# Patient Record
Sex: FEMALE | Race: WHITE | NOT HISPANIC OR LATINO | Employment: FULL TIME | URBAN - NONMETROPOLITAN AREA
[De-identification: names, ages, dates, MRNs, and addresses within clinical notes are randomized per-mention and may not be internally consistent; named-entity substitution may affect disease eponyms.]

---

## 2017-10-02 ENCOUNTER — OFFICE VISIT (OUTPATIENT)
Dept: OBSTETRICS AND GYNECOLOGY | Facility: CLINIC | Age: 52
End: 2017-10-02

## 2017-10-02 VITALS
WEIGHT: 232 LBS | HEIGHT: 63 IN | SYSTOLIC BLOOD PRESSURE: 124 MMHG | BODY MASS INDEX: 41.11 KG/M2 | DIASTOLIC BLOOD PRESSURE: 76 MMHG

## 2017-10-02 DIAGNOSIS — Z01.411 ENCOUNTER FOR GYNECOLOGICAL EXAMINATION WITH ABNORMAL FINDING: Primary | ICD-10-CM

## 2017-10-02 PROCEDURE — 99386 PREV VISIT NEW AGE 40-64: CPT | Performed by: OBSTETRICS & GYNECOLOGY

## 2017-10-02 PROCEDURE — G0123 SCREEN CERV/VAG THIN LAYER: HCPCS | Performed by: OBSTETRICS & GYNECOLOGY

## 2017-10-02 RX ORDER — PHENOL 1.4 %
600 AEROSOL, SPRAY (ML) MUCOUS MEMBRANE DAILY
COMMUNITY
End: 2017-10-23 | Stop reason: ALTCHOICE

## 2017-10-02 RX ORDER — MAGNESIUM GLUCONATE 27 MG(500)
500 TABLET ORAL 2 TIMES DAILY
COMMUNITY
End: 2019-07-30 | Stop reason: ALTCHOICE

## 2017-10-02 NOTE — PROGRESS NOTES
Janae Randhawa is a 52 y.o. female who presents for a yearly gyn exam.    History of Present Illness  Patient is concerned with heavy menses.  She had an endometrial ablation in 2005.  Her menses are q 3 wks.  The following portions of the patient's history were reviewed and updated as appropriate: allergies, current medications, past family history, past medical history, past social history, past surgical history and problem list.    Review of Systems   Constitutional: Negative for fatigue and unexpected weight change.   HENT: Negative.    Eyes: Negative.    Respiratory: Negative for cough, chest tightness and shortness of breath.    Cardiovascular: Negative for chest pain, palpitations and leg swelling.   Gastrointestinal: Negative for abdominal distention, abdominal pain, anal bleeding, blood in stool, constipation, diarrhea, nausea and vomiting.   Endocrine: Negative for polydipsia and polyuria.   Genitourinary: Positive for menstrual problem. Negative for difficulty urinating, dyspareunia, dysuria, frequency, genital sores, hematuria, pelvic pain, urgency, vaginal bleeding, vaginal discharge and vaginal pain.   Musculoskeletal: Negative.    Skin: Negative for color change and rash.   Allergic/Immunologic: Negative.    Neurological: Negative.  Negative for dizziness, seizures, syncope, light-headedness and headaches.   Hematological: Negative for adenopathy. Does not bruise/bleed easily.   Psychiatric/Behavioral: Negative for agitation, confusion, dysphoric mood, sleep disturbance and suicidal ideas. The patient is not nervous/anxious.        Objective   Physical Exam   Constitutional: She is oriented to person, place, and time. She appears well-developed and well-nourished.   HENT:   Head: Normocephalic.   Eyes: Conjunctivae are normal. Pupils are equal, round, and reactive to light. Right eye exhibits no discharge. Left eye exhibits no discharge.   Neck: Normal range of motion. Neck supple. No tracheal  deviation present. No thyromegaly present.   Cardiovascular: Normal rate, regular rhythm and normal heart sounds.    Pulmonary/Chest: Effort normal and breath sounds normal. She has no wheezes. She has no rales. Right breast exhibits no inverted nipple, no mass, no nipple discharge, no skin change and no tenderness. Left breast exhibits no inverted nipple, no mass, no nipple discharge, no skin change and no tenderness. Breasts are symmetrical. There is no breast swelling.   Abdominal: Soft. Bowel sounds are normal. She exhibits no distension and no mass. There is no tenderness. There is no rebound and no guarding. No hernia. Hernia confirmed negative in the right inguinal area and confirmed negative in the left inguinal area.   Genitourinary: Rectum normal, vagina normal and uterus normal. Rectal exam shows no external hemorrhoid, no internal hemorrhoid, no fissure, no mass, no tenderness and anal tone normal. No breast tenderness, discharge or bleeding. Pelvic exam was performed with patient supine. No labial fusion. There is no rash, tenderness, lesion or injury on the right labia. There is no rash, tenderness, lesion or injury on the left labia. Cervix exhibits no motion tenderness, no discharge and no friability. Right adnexum displays no mass, no tenderness and no fullness. Left adnexum displays no mass, no tenderness and no fullness. No erythema, tenderness or bleeding in the vagina. No foreign body in the vagina. No signs of injury around the vagina. No vaginal discharge found.   Genitourinary Comments: BUS glands appear nl, perineum intact, urethral orifice appears nl, vagina has good support.   Musculoskeletal: Normal range of motion.   Lymphadenopathy:        Right: No inguinal adenopathy present.        Left: No inguinal adenopathy present.   Neurological: She is alert and oriented to person, place, and time. She has normal reflexes.   Skin: Skin is warm and dry. No rash noted.   Psychiatric: She has a  normal mood and affect. Her behavior is normal. Judgment and thought content normal.   Nursing note and vitals reviewed.        Assessment/Plan    WWE  Pap performed   Mammo is up to date.    Menorrhagia s/p an endometrial ablation 12 years ago.  Vaginal US on return.

## 2017-10-03 LAB
GEN CATEG CVX/VAG CYTO-IMP: NORMAL
LAB AP CASE REPORT: NORMAL
LAB AP GYN ADDITIONAL INFORMATION: NORMAL
LAB AP GYN OTHER FINDINGS: NORMAL
Lab: NORMAL
PATH INTERP SPEC-IMP: NORMAL
STAT OF ADQ CVX/VAG CYTO-IMP: NORMAL

## 2017-10-05 ENCOUNTER — OFFICE VISIT (OUTPATIENT)
Dept: OBSTETRICS AND GYNECOLOGY | Facility: CLINIC | Age: 52
End: 2017-10-05

## 2017-10-05 ENCOUNTER — PROCEDURE VISIT (OUTPATIENT)
Dept: OBSTETRICS AND GYNECOLOGY | Facility: CLINIC | Age: 52
End: 2017-10-05

## 2017-10-05 VITALS
HEIGHT: 63 IN | BODY MASS INDEX: 41.46 KG/M2 | WEIGHT: 234 LBS | DIASTOLIC BLOOD PRESSURE: 76 MMHG | SYSTOLIC BLOOD PRESSURE: 120 MMHG

## 2017-10-05 DIAGNOSIS — N92.0 MENORRHAGIA WITH REGULAR CYCLE: Primary | ICD-10-CM

## 2017-10-05 DIAGNOSIS — N92.6 IRREGULAR BLEEDING: Primary | ICD-10-CM

## 2017-10-05 PROCEDURE — 99213 OFFICE O/P EST LOW 20 MIN: CPT | Performed by: OBSTETRICS & GYNECOLOGY

## 2017-10-05 PROCEDURE — 76830 TRANSVAGINAL US NON-OB: CPT | Performed by: OBSTETRICS & GYNECOLOGY

## 2017-10-05 NOTE — PROGRESS NOTES
Janae Randhawa is a 52 y.o. female who has had increasing bleeding.  She is s/p an ablation and was amenorrheic for several years and now is experiencing heavy menses.      History of Present Illness  As above and her US was normal except for a 14 mm lining.    The following portions of the patient's history were reviewed and updated as appropriate: allergies, current medications, past family history, past medical history, past social history, past surgical history and problem list.    Review of Systems   Constitutional: Negative for fatigue and unexpected weight change.   HENT: Negative.    Eyes: Negative.    Respiratory: Negative for cough, chest tightness and shortness of breath.    Cardiovascular: Negative for chest pain, palpitations and leg swelling.   Gastrointestinal: Negative for abdominal distention, abdominal pain, anal bleeding, blood in stool, constipation, diarrhea, nausea and vomiting.   Endocrine: Negative for polydipsia and polyuria.   Genitourinary: Positive for menstrual problem. Negative for difficulty urinating, dyspareunia, dysuria, frequency, genital sores, hematuria, pelvic pain, urgency, vaginal bleeding, vaginal discharge and vaginal pain.   Musculoskeletal: Negative.    Skin: Negative for color change and rash.   Allergic/Immunologic: Negative.    Neurological: Negative.  Negative for dizziness, seizures, syncope, light-headedness and headaches.   Hematological: Negative for adenopathy. Does not bruise/bleed easily.   Psychiatric/Behavioral: Negative for agitation, confusion, dysphoric mood, sleep disturbance and suicidal ideas. The patient is not nervous/anxious.        Objective   Physical Exam   Constitutional: She is oriented to person, place, and time. She appears well-developed and well-nourished.   HENT:   Head: Normocephalic.   Eyes: Pupils are equal, round, and reactive to light.   Neck: Normal range of motion.   Cardiovascular: Normal rate.    Pulmonary/Chest: Effort  normal.   Abdominal: Soft.   Musculoskeletal: Normal range of motion.   Neurological: She is alert and oriented to person, place, and time.   Skin: Skin is warm and dry.   Psychiatric: She has a normal mood and affect. Her behavior is normal. Judgment and thought content normal.       Assessment/Plan   Menorrhagia, s/p an ablation  Endo stripe 14mm  Will check FSH and Estradiol to see if she is postmenopausal.

## 2017-10-06 LAB
ESTRADIOL SERPL-MCNC: 182.9 PG/ML
FSH SERPL-ACNC: 6.9 MIU/ML

## 2017-10-19 ENCOUNTER — TELEPHONE (OUTPATIENT)
Dept: OBSTETRICS AND GYNECOLOGY | Facility: CLINIC | Age: 52
End: 2017-10-19

## 2017-10-19 NOTE — TELEPHONE ENCOUNTER
Patient informed that her FSH showed that we are premenopausal. Patient has a follow up appointment Monday(10/23/17).

## 2017-10-23 ENCOUNTER — OFFICE VISIT (OUTPATIENT)
Dept: OBSTETRICS AND GYNECOLOGY | Facility: CLINIC | Age: 52
End: 2017-10-23

## 2017-10-23 VITALS
SYSTOLIC BLOOD PRESSURE: 130 MMHG | DIASTOLIC BLOOD PRESSURE: 80 MMHG | WEIGHT: 234 LBS | BODY MASS INDEX: 41.46 KG/M2 | HEIGHT: 63 IN

## 2017-10-23 DIAGNOSIS — N92.0 MENORRHAGIA WITH REGULAR CYCLE: Primary | ICD-10-CM

## 2017-10-23 PROCEDURE — 99212 OFFICE O/P EST SF 10 MIN: CPT | Performed by: OBSTETRICS & GYNECOLOGY

## 2017-10-23 NOTE — PROGRESS NOTES
Subjective   Alyssa Randhawa is a 52 y.o. female who is s/p an endometrial ablation several years ago.  Patient is now having heavy bleeding.    History of Present Illness  Patient did well with the endometrial ablation for several years but is now bleeding heavily.  Patient's endometrial stripe is 14 mm.  Her FSH is premenopausal.  She is here to discuss her options.  The following portions of the patient's history were reviewed and updated as appropriate: allergies, current medications, past family history, past medical history, past social history, past surgical history and problem list.    Review of Systems   Genitourinary: Positive for menstrual problem.       Objective   Physical Exam   Constitutional: She is oriented to person, place, and time. She appears well-developed and well-nourished.   HENT:   Head: Normocephalic.   Neck: Normal range of motion.   Cardiovascular: Normal rate.    Pulmonary/Chest: Effort normal.   Musculoskeletal: Normal range of motion.   Neurological: She is alert and oriented to person, place, and time.   Skin: Skin is warm and dry.   Psychiatric: She has a normal mood and affect. Her behavior is normal. Judgment and thought content normal.   Nursing note and vitals reviewed.      Assessment/Plan     Menorrhagia s/p an endometrial ablation  Patient is premenopausal.  Discussed Mirena vs repeat endometrial ablation.    Patient wants a Mirena.    Discuss RBO

## 2017-11-08 ENCOUNTER — OFFICE VISIT (OUTPATIENT)
Dept: OBSTETRICS AND GYNECOLOGY | Facility: CLINIC | Age: 52
End: 2017-11-08

## 2017-11-08 VITALS
DIASTOLIC BLOOD PRESSURE: 78 MMHG | WEIGHT: 230 LBS | HEIGHT: 63 IN | SYSTOLIC BLOOD PRESSURE: 134 MMHG | BODY MASS INDEX: 40.75 KG/M2

## 2017-11-08 DIAGNOSIS — Z30.430 ENCOUNTER FOR INSERTION OF MIRENA IUD: Primary | ICD-10-CM

## 2017-11-08 LAB
B-HCG UR QL: NEGATIVE
INTERNAL NEGATIVE CONTROL: NORMAL
INTERNAL POSITIVE CONTROL: NORMAL
Lab: NORMAL

## 2017-11-08 PROCEDURE — 81025 URINE PREGNANCY TEST: CPT | Performed by: OBSTETRICS & GYNECOLOGY

## 2017-11-08 PROCEDURE — 58300 INSERT INTRAUTERINE DEVICE: CPT | Performed by: OBSTETRICS & GYNECOLOGY

## 2017-11-08 NOTE — PROGRESS NOTES
IUD Insertion Procedure Note    Pre-operative Diagnosis: Desires IUD insertion    Post-operative Diagnosis: Same    Indications: contraception and menorrhagia    Procedure Details   Urine pregnancy test was done and found to be negative. The risks (including infection, bleeding, pain, and uterine perforation) and benefits of the procedure were explained to the patient and written informed consent was obtained.      The cervix was cleansed with Betadine paint.   The uterus was sounded without dilation. The uterus sounded to 7 cm and the  IUD was inserted without difficulty.The string was visualized and trimmed. Patient tolerated procedure well.    IUD Information:  Mirena, Lot # [id number], Expiration date 12/2018, NDC#: 0527726212.    Condition at termination of procedure:  Stable    Complications:  None    Plan:    The patient was advised to call for any fever or for prolonged severe pain and or bleeding. She was advised to use OTC ibuprofen as needed for mild to moderate pain.   RV 2 wks to assess IUD placement

## 2017-11-16 ENCOUNTER — PROCEDURE VISIT (OUTPATIENT)
Dept: OBSTETRICS AND GYNECOLOGY | Facility: CLINIC | Age: 52
End: 2017-11-16

## 2017-11-16 ENCOUNTER — OFFICE VISIT (OUTPATIENT)
Dept: OBSTETRICS AND GYNECOLOGY | Facility: CLINIC | Age: 52
End: 2017-11-16

## 2017-11-16 VITALS
SYSTOLIC BLOOD PRESSURE: 138 MMHG | WEIGHT: 228 LBS | DIASTOLIC BLOOD PRESSURE: 72 MMHG | BODY MASS INDEX: 40.4 KG/M2 | HEIGHT: 63 IN

## 2017-11-16 DIAGNOSIS — R10.2 PELVIC PAIN: Primary | ICD-10-CM

## 2017-11-16 DIAGNOSIS — N92.0 MENORRHAGIA WITH REGULAR CYCLE: Primary | ICD-10-CM

## 2017-11-16 PROCEDURE — 76856 US EXAM PELVIC COMPLETE: CPT | Performed by: OBSTETRICS & GYNECOLOGY

## 2017-11-16 PROCEDURE — 58301 REMOVE INTRAUTERINE DEVICE: CPT | Performed by: OBSTETRICS & GYNECOLOGY

## 2017-11-16 NOTE — PROGRESS NOTES
IUD Removal Procedure Note    Type of IUD:  Mirena  Date of insertion:  unknown  Reason for removal:  Side effect: severe cramping  Other relevant history/information:  Patient is having severe cramping since insertion of the Mirena and wants the Mirena removed.  US today showed the Mirena in the proper place.      Procedure Details  IUD strings visible:  yes  Local anesthesia:  None  Tenaculum used:  None  Removal:  IUD strings grasped and IUD removed intact with gentle traction.  The patient tolerated the procedure well.    All appropriate instructions regarding removal were reviewed.    Plans for contraception:  no method    Other follow-up needed:  Patient has had an endometrial ablation and has begun heavy bleeding.  She is definitely not postmenopausal and desires a vaginal hysterectomy.      The patient was advised to call for any fever or for prolonged or severe pain or bleeding. She was advised to use OTC ibuprofen as needed for mild to moderate pain.

## 2017-11-21 ENCOUNTER — OFFICE VISIT (OUTPATIENT)
Dept: OBSTETRICS AND GYNECOLOGY | Facility: CLINIC | Age: 52
End: 2017-11-21

## 2017-11-21 VITALS
WEIGHT: 233 LBS | DIASTOLIC BLOOD PRESSURE: 80 MMHG | SYSTOLIC BLOOD PRESSURE: 124 MMHG | BODY MASS INDEX: 41.29 KG/M2 | HEIGHT: 63 IN

## 2017-11-21 DIAGNOSIS — N92.0 MENORRHAGIA WITH REGULAR CYCLE: Primary | ICD-10-CM

## 2017-11-21 DIAGNOSIS — N94.6 DYSMENORRHEA: ICD-10-CM

## 2017-11-21 PROCEDURE — 99213 OFFICE O/P EST LOW 20 MIN: CPT | Performed by: OBSTETRICS & GYNECOLOGY

## 2017-11-21 RX ORDER — PHENAZOPYRIDINE HYDROCHLORIDE 100 MG/1
100 TABLET, FILM COATED ORAL ONCE
Status: CANCELLED | OUTPATIENT
Start: 2017-11-21

## 2017-11-21 RX ORDER — SODIUM CHLORIDE 9 MG/ML
100 INJECTION, SOLUTION INTRAVENOUS CONTINUOUS
Status: CANCELLED | OUTPATIENT
Start: 2017-11-21

## 2017-11-21 NOTE — PROGRESS NOTES
"Alyssa Randhawa is a 52 y.o. female here today to discuss treatment of menorrhagia and dysmenorrhea.  She has been evaluated by Dr. Palacios and has a history of prior endometrial ablation and a recent attempt at using a Mirena IUD.  She had severe cramping after placement of the IUD and it had to be removed.  She is para 2 having had 2 vaginal deliveries.  Her ultrasound shows a mildly enlarged uterus with otherwise normal findings.    Visit Vitals   • /80   • Ht 63\" (160 cm)   • Wt 233 lb (106 kg)   • LMP 11/08/2017   • BMI 41.27 kg/m2     Pleasant female no acute distress  Breathing unlabored  Mood and affect normal  Insight and judgment intact    Assessment: Menorrhagia and dysmenorrhea    I reviewed her history and ultrasound findings.  We discussed treatment options as well as minimally invasive surgery.  She has failed a couple of different conservative treatment measures, and would like to proceed with definitive surgery with hysterectomy.  She would like to return to work as fast as possible, and given the mild enlargement of her uterus, we discussed a laparoscopic hysterectomy.  We discussed using the da Antonieta surgical system to proceed with a laparoscopic hysterectomy as well as bilateral salpingectomy for ovarian cancer risk reduction.  We discussed risks of surgery including bleeding, infection, and damage to surrounding structures.  She would like to proceed and is scheduled for surgery on December 6.    "

## 2017-11-21 NOTE — H&P
[facility]  Alyssa Randhawa  : 1965  MRN: [id number]  CSN: [id number]    History and Physical    Subjective   Alyssa Randhawa is a 52 y.o. year old  who presents for consultation due to menorrhagia and dysmenorrhea.  She is para 2 and has had 2 vaginal deliveries.  She has had a previous endometrial ablation, but has had return of her symptoms.  A recent trial of therapy with a Mirena IUD was unsuccessful.    Past Medical History:   Diagnosis Date   • Arthritis    • Bone spur     spine   • Heel spur      Past Surgical History:   Procedure Laterality Date   • CHOLECYSTECTOMY     • ENDOMETRIAL ABLATION     • KNEE MENISCAL REPAIR Left    • LEG SURGERY Right     spiral fracture    • WISDOM TOOTH EXTRACTION       Smoking status: Former Smoker                                                              Packs/day: 0.00      Years: 0.00      Smokeless status: Never Used                          Current Outpatient Prescriptions:   •  Calcium-Iron-Vit D-Vit K (CALCIUM SOFT CHEWS) [id number] MG-UNT-MCG chewable tablet, Chew., Disp: , Rfl:   •  magnesium gluconate (MAGONATE) 500 MG tablet, Take 500 mg by mouth 2 (Two) Times a Day., Disp: , Rfl:   •  Multiple Vitamin (MULTI VITAMIN DAILY PO), Take 1 tablet by mouth Daily., Disp: , Rfl:   •  Unable to find, Take 1 each by mouth 1 (One) Time. Wild cherry extract, Disp: , Rfl:     No Known Allergies    Family History   Problem Relation Age of Onset   • Breast cancer Sister    • Hypertension Father    • Coronary artery disease Mother    • Hypertension Mother    • Diabetes Mother    • Ovarian cancer Neg Hx    • Uterine cancer Neg Hx    • Colon cancer Neg Hx    • Melanoma Neg Hx      Review of Systems   Constitutional: Negative for unexpected weight change.   HENT: Negative for trouble swallowing.    Respiratory: Negative for shortness of breath.    Cardiovascular: Negative for chest pain.   Genitourinary: Positive for menstrual problem.   Musculoskeletal: Negative for neck  "stiffness.   Neurological: Negative for seizures.         Objective   /80  Ht 63\" (160 cm)  Wt 233 lb (106 kg)  LMP 11/08/2017  BMI 41.27 kg/m2    Physical Exam   Physical Exam   Constitutional: She is oriented to person, place, and time. She appears well-developed and well-nourished.   HENT:   Head: Normocephalic and atraumatic.   Eyes: No scleral icterus.   Neck: No tracheal deviation present.   Cardiovascular: Normal rate and regular rhythm.    Pulmonary/Chest: Effort normal and breath sounds normal.   Abdominal: Soft. Bowel sounds are normal. She exhibits no distension. There is no tenderness.   Genitourinary: Uterus normal. Rectal exam shows no external hemorrhoid. Pelvic exam was performed with patient supine. There is no lesion on the right labia. There is no lesion on the left labia. Uterus is not enlarged, not fixed and not tender. Cervix exhibits no motion tenderness. Right adnexum displays no mass. Left adnexum displays no mass. No bleeding in the vagina. No vaginal discharge found.   Lymphadenopathy:        Right: No inguinal adenopathy present.        Left: No inguinal adenopathy present.   Neurological: She is alert and oriented to person, place, and time.   Skin: Skin is warm and dry.   Vitals reviewed.      Labs  No results found for: HCG, PLT, HGB, HCT, WBC, NA, K, CL, CO2, BUN, CREATININE, GLUCOSE, ALBUMIN, CALCIUM, AST, ALT, BILITOT     Assessment & Plan    Alyssa was seen today for follow-up.    Diagnoses and all orders for this visit:    Menorrhagia with regular cycle  -     Case Request  -     CBC (No Diff); Future  -     Type and screen; Future  -     sodium chloride 0.9 % infusion; Infuse 100 mL/hr into a venous catheter Continuous.  -     Basic Metabolic Panel; Future  -     ECG 12 Lead; Future  -     XR Chest 1 View; Future  -     cefOXitin (MEFOXIN) 2 g in sodium chloride 0.9 % 100 mL IVPB; Infuse 2 g into a venous catheter 1 (One) Time.  -     phenazopyridine (PYRIDIUM) tablet 100 " mg; Take 1 tablet by mouth 1 (One) Time.    Dysmenorrhea  -     Case Request  -     CBC (No Diff); Future  -     Type and screen; Future  -     sodium chloride 0.9 % infusion; Infuse 100 mL/hr into a venous catheter Continuous.  -     Basic Metabolic Panel; Future  -     ECG 12 Lead; Future  -     XR Chest 1 View; Future  -     cefOXitin (MEFOXIN) 2 g in sodium chloride 0.9 % 100 mL IVPB; Infuse 2 g into a venous catheter 1 (One) Time.  -     phenazopyridine (PYRIDIUM) tablet 100 mg; Take 1 tablet by mouth 1 (One) Time.    Other orders  -     Follow Anesthesia Guidelines / Standing Orders; Future  -     Provide instructions to patient on NPO status  -     Follow Anesthesia Guidelines / Standing Orders; Standing  -     Verify NPO Status; Standing  -     Obtain informed consent; Standing  -     SCD (sequential compression device)- to be placed on patient in Pre-op; Standing  -     Clip operative site; Standing  -     Have patient void prior to transfer; Standing  -     Type & Screen; Standing    Risks, benefits, and alternatives of a hysterectomy were discussed with the patient in detail. Intraoperative risks of bleeding and damage to surrounding organs, including but not limited to intestine, bladder and ureter, were explained. Management of these were also explained. Postoperative complications such as infection, pneumonia, DVT, and bleeding were explained. Cuff dehiscence and cuff hematoma/cellulitis were also explained. The importance of compliance with postoperative restrictions was discussed. Long term effect regarding pelvic organ prolapse was also explained. Laparoscopic approach with use of the da Antonieta robotic system was explained. Indications for conversion to a laparotomy were discussed.     Ovarian conservation/removal was also discussed. Removal of fallopian tubes regardless of ovarian removal was discussed and patient verbalized understanding.    All of the patient's questions were answered to her  satisfaction. She was encouraged to return for an additional appointment if she had further questions. She verbalized understanding of the above and wished to proceed with the outlined plan.      Greg Mejía MD  11/21/2017  1:04 PM

## 2017-11-26 ENCOUNTER — RESULTS ENCOUNTER (OUTPATIENT)
Dept: OBSTETRICS AND GYNECOLOGY | Facility: CLINIC | Age: 52
End: 2017-11-26

## 2017-11-26 DIAGNOSIS — N94.6 DYSMENORRHEA: ICD-10-CM

## 2017-11-26 DIAGNOSIS — N92.0 MENORRHAGIA WITH REGULAR CYCLE: ICD-10-CM

## 2017-11-29 ENCOUNTER — APPOINTMENT (OUTPATIENT)
Dept: PREADMISSION TESTING | Facility: HOSPITAL | Age: 52
End: 2017-11-29

## 2017-11-29 ENCOUNTER — HOSPITAL ENCOUNTER (OUTPATIENT)
Dept: GENERAL RADIOLOGY | Facility: HOSPITAL | Age: 52
Discharge: HOME | End: 2017-11-29
Admitting: OBSTETRICS & GYNECOLOGY

## 2017-11-29 VITALS
DIASTOLIC BLOOD PRESSURE: 77 MMHG | BODY MASS INDEX: 40.36 KG/M2 | OXYGEN SATURATION: 98 % | HEART RATE: 73 BPM | RESPIRATION RATE: 16 BRPM | SYSTOLIC BLOOD PRESSURE: 129 MMHG | HEIGHT: 64 IN | WEIGHT: 236.4 LBS

## 2017-11-29 DIAGNOSIS — N92.0 MENORRHAGIA WITH REGULAR CYCLE: ICD-10-CM

## 2017-11-29 DIAGNOSIS — N94.6 DYSMENORRHEA: ICD-10-CM

## 2017-11-29 LAB
ANION GAP SERPL CALCULATED.3IONS-SCNC: 8 MMOL/L (ref 4–13)
BUN BLD-MCNC: 12 MG/DL (ref 5–21)
BUN/CREAT SERPL: 16 (ref 7–25)
CALCIUM SPEC-SCNC: 9.2 MG/DL (ref 8.4–10.4)
CHLORIDE SERPL-SCNC: 102 MMOL/L (ref 98–110)
CO2 SERPL-SCNC: 28 MMOL/L (ref 24–31)
CREAT BLD-MCNC: 0.75 MG/DL (ref 0.5–1.4)
DEPRECATED RDW RBC AUTO: 36.9 FL (ref 40–54)
ERYTHROCYTE [DISTWIDTH] IN BLOOD BY AUTOMATED COUNT: 12.4 % (ref 12–15)
GFR SERPL CREATININE-BSD FRML MDRD: 81 ML/MIN/1.73
GLUCOSE BLD-MCNC: 89 MG/DL (ref 70–100)
HCT VFR BLD AUTO: 38.5 % (ref 37–47)
HGB BLD-MCNC: 13.4 G/DL (ref 12–16)
MCH RBC QN AUTO: 28.7 PG (ref 28–32)
MCHC RBC AUTO-ENTMCNC: 34.8 G/DL (ref 33–36)
MCV RBC AUTO: 82.4 FL (ref 82–98)
PLATELET # BLD AUTO: 337 10*3/MM3 (ref 130–400)
PMV BLD AUTO: 10.8 FL (ref 6–12)
POTASSIUM BLD-SCNC: 4.3 MMOL/L (ref 3.5–5.3)
RBC # BLD AUTO: 4.67 10*6/MM3 (ref 4.2–5.4)
SODIUM BLD-SCNC: 138 MMOL/L (ref 135–145)
WBC NRBC COR # BLD: 10.2 10*3/MM3 (ref 4.8–10.8)

## 2017-11-29 PROCEDURE — 36415 COLL VENOUS BLD VENIPUNCTURE: CPT | Performed by: OBSTETRICS & GYNECOLOGY

## 2017-11-29 PROCEDURE — 85027 COMPLETE CBC AUTOMATED: CPT | Performed by: OBSTETRICS & GYNECOLOGY

## 2017-11-29 PROCEDURE — 71010 HC CHEST PA OR AP: CPT

## 2017-11-29 PROCEDURE — 93005 ELECTROCARDIOGRAM TRACING: CPT

## 2017-11-29 PROCEDURE — 80048 BASIC METABOLIC PNL TOTAL CA: CPT | Performed by: OBSTETRICS & GYNECOLOGY

## 2017-11-29 PROCEDURE — 93010 ELECTROCARDIOGRAM REPORT: CPT | Performed by: INTERNAL MEDICINE

## 2017-12-05 ENCOUNTER — ANESTHESIA EVENT (OUTPATIENT)
Dept: PERIOP | Facility: HOSPITAL | Age: 52
End: 2017-12-05

## 2017-12-05 RX ORDER — SODIUM CHLORIDE 0.9 % (FLUSH) 0.9 %
1-10 SYRINGE (ML) INJECTION AS NEEDED
Status: DISCONTINUED | OUTPATIENT
Start: 2017-12-06 | End: 2017-12-06 | Stop reason: HOSPADM

## 2017-12-05 RX ORDER — SODIUM CHLORIDE, SODIUM LACTATE, POTASSIUM CHLORIDE, CALCIUM CHLORIDE 600; 310; 30; 20 MG/100ML; MG/100ML; MG/100ML; MG/100ML
30 INJECTION, SOLUTION INTRAVENOUS CONTINUOUS
Status: DISCONTINUED | OUTPATIENT
Start: 2017-12-06 | End: 2017-12-06 | Stop reason: HOSPADM

## 2017-12-06 ENCOUNTER — ANESTHESIA (OUTPATIENT)
Dept: PERIOP | Facility: HOSPITAL | Age: 52
End: 2017-12-06

## 2017-12-06 ENCOUNTER — HOSPITAL ENCOUNTER (OUTPATIENT)
Facility: HOSPITAL | Age: 52
Setting detail: HOSPITAL OUTPATIENT SURGERY
Discharge: HOME | End: 2017-12-06
Attending: OBSTETRICS & GYNECOLOGY | Admitting: OBSTETRICS & GYNECOLOGY

## 2017-12-06 VITALS
DIASTOLIC BLOOD PRESSURE: 64 MMHG | OXYGEN SATURATION: 95 % | SYSTOLIC BLOOD PRESSURE: 128 MMHG | RESPIRATION RATE: 16 BRPM | HEART RATE: 99 BPM | TEMPERATURE: 97.7 F

## 2017-12-06 DIAGNOSIS — N92.0 MENORRHAGIA WITH REGULAR CYCLE: ICD-10-CM

## 2017-12-06 DIAGNOSIS — N94.6 DYSMENORRHEA: ICD-10-CM

## 2017-12-06 LAB
ABO GROUP BLD: NORMAL
B-HCG UR QL: NEGATIVE
BLD GP AB SCN SERPL QL: NEGATIVE
RH BLD: POSITIVE

## 2017-12-06 PROCEDURE — 86901 BLOOD TYPING SEROLOGIC RH(D): CPT | Performed by: OBSTETRICS & GYNECOLOGY

## 2017-12-06 PROCEDURE — 25010000002 DEXAMETHASONE PER 1 MG: Performed by: ANESTHESIOLOGY

## 2017-12-06 PROCEDURE — 58571 TLH W/T/O 250 G OR LESS: CPT | Performed by: OBSTETRICS & GYNECOLOGY

## 2017-12-06 PROCEDURE — 25010000002 ONDANSETRON PER 1 MG: Performed by: NURSE ANESTHETIST, CERTIFIED REGISTERED

## 2017-12-06 PROCEDURE — 25010000002 MIDAZOLAM PER 1 MG: Performed by: ANESTHESIOLOGY

## 2017-12-06 PROCEDURE — 25010000002 CEFOXITIN: Performed by: OBSTETRICS & GYNECOLOGY

## 2017-12-06 PROCEDURE — 86900 BLOOD TYPING SEROLOGIC ABO: CPT | Performed by: OBSTETRICS & GYNECOLOGY

## 2017-12-06 PROCEDURE — 25010000002 NEOSTIGMINE PER 0.5 MG: Performed by: NURSE ANESTHETIST, CERTIFIED REGISTERED

## 2017-12-06 PROCEDURE — 86850 RBC ANTIBODY SCREEN: CPT | Performed by: OBSTETRICS & GYNECOLOGY

## 2017-12-06 PROCEDURE — 25010000002 PROPOFOL 10 MG/ML EMULSION: Performed by: NURSE ANESTHETIST, CERTIFIED REGISTERED

## 2017-12-06 PROCEDURE — 25010000002 DEXAMETHASONE PER 1 MG: Performed by: NURSE ANESTHETIST, CERTIFIED REGISTERED

## 2017-12-06 PROCEDURE — 81025 URINE PREGNANCY TEST: CPT | Performed by: OBSTETRICS & GYNECOLOGY

## 2017-12-06 PROCEDURE — 25010000002 FENTANYL CITRATE (PF) 100 MCG/2ML SOLUTION: Performed by: NURSE ANESTHETIST, CERTIFIED REGISTERED

## 2017-12-06 PROCEDURE — 88307 TISSUE EXAM BY PATHOLOGIST: CPT | Performed by: OBSTETRICS & GYNECOLOGY

## 2017-12-06 PROCEDURE — 25010000002 SUCCINYLCHOLINE PER 20 MG: Performed by: NURSE ANESTHETIST, CERTIFIED REGISTERED

## 2017-12-06 RX ORDER — LIDOCAINE HYDROCHLORIDE 20 MG/ML
INJECTION, SOLUTION INFILTRATION; PERINEURAL AS NEEDED
Status: DISCONTINUED | OUTPATIENT
Start: 2017-12-06 | End: 2017-12-06 | Stop reason: SURG

## 2017-12-06 RX ORDER — ROCURONIUM BROMIDE 10 MG/ML
INJECTION, SOLUTION INTRAVENOUS AS NEEDED
Status: DISCONTINUED | OUTPATIENT
Start: 2017-12-06 | End: 2017-12-06 | Stop reason: SURG

## 2017-12-06 RX ORDER — MEPERIDINE HYDROCHLORIDE 25 MG/ML
12.5 INJECTION INTRAMUSCULAR; INTRAVENOUS; SUBCUTANEOUS
Status: DISCONTINUED | OUTPATIENT
Start: 2017-12-06 | End: 2017-12-06 | Stop reason: HOSPADM

## 2017-12-06 RX ORDER — PHENYLEPHRINE HCL IN 0.9% NACL 0.8MG/10ML
SYRINGE (ML) INTRAVENOUS AS NEEDED
Status: DISCONTINUED | OUTPATIENT
Start: 2017-12-06 | End: 2017-12-06 | Stop reason: SURG

## 2017-12-06 RX ORDER — SCOLOPAMINE TRANSDERMAL SYSTEM 1 MG/1
1 PATCH, EXTENDED RELEASE TRANSDERMAL ONCE
Status: DISCONTINUED | OUTPATIENT
Start: 2017-12-06 | End: 2017-12-06 | Stop reason: HOSPADM

## 2017-12-06 RX ORDER — SUFENTANIL CITRATE 50 UG/ML
INJECTION EPIDURAL; INTRAVENOUS AS NEEDED
Status: DISCONTINUED | OUTPATIENT
Start: 2017-12-06 | End: 2017-12-06 | Stop reason: SURG

## 2017-12-06 RX ORDER — IPRATROPIUM BROMIDE AND ALBUTEROL SULFATE 2.5; .5 MG/3ML; MG/3ML
3 SOLUTION RESPIRATORY (INHALATION) ONCE AS NEEDED
Status: DISCONTINUED | OUTPATIENT
Start: 2017-12-06 | End: 2017-12-06 | Stop reason: HOSPADM

## 2017-12-06 RX ORDER — PHENAZOPYRIDINE HYDROCHLORIDE 100 MG/1
100 TABLET, FILM COATED ORAL ONCE
Status: COMPLETED | OUTPATIENT
Start: 2017-12-06 | End: 2017-12-06

## 2017-12-06 RX ORDER — PROPOFOL 10 MG/ML
VIAL (ML) INTRAVENOUS AS NEEDED
Status: DISCONTINUED | OUTPATIENT
Start: 2017-12-06 | End: 2017-12-06 | Stop reason: SURG

## 2017-12-06 RX ORDER — SODIUM CHLORIDE 9 MG/ML
100 INJECTION, SOLUTION INTRAVENOUS CONTINUOUS
Status: DISCONTINUED | OUTPATIENT
Start: 2017-12-06 | End: 2017-12-06 | Stop reason: HOSPADM

## 2017-12-06 RX ORDER — DEXAMETHASONE SODIUM PHOSPHATE 4 MG/ML
INJECTION, SOLUTION INTRA-ARTICULAR; INTRALESIONAL; INTRAMUSCULAR; INTRAVENOUS; SOFT TISSUE AS NEEDED
Status: DISCONTINUED | OUTPATIENT
Start: 2017-12-06 | End: 2017-12-06 | Stop reason: SURG

## 2017-12-06 RX ORDER — SUCCINYLCHOLINE CHLORIDE 20 MG/ML
INJECTION INTRAMUSCULAR; INTRAVENOUS AS NEEDED
Status: DISCONTINUED | OUTPATIENT
Start: 2017-12-06 | End: 2017-12-06 | Stop reason: SURG

## 2017-12-06 RX ORDER — MIDAZOLAM HYDROCHLORIDE 1 MG/ML
2 INJECTION INTRAMUSCULAR; INTRAVENOUS
Status: DISCONTINUED | OUTPATIENT
Start: 2017-12-06 | End: 2017-12-06 | Stop reason: HOSPADM

## 2017-12-06 RX ORDER — MAGNESIUM HYDROXIDE 1200 MG/15ML
LIQUID ORAL AS NEEDED
Status: DISCONTINUED | OUTPATIENT
Start: 2017-12-06 | End: 2017-12-06 | Stop reason: HOSPADM

## 2017-12-06 RX ORDER — ONDANSETRON 2 MG/ML
4 INJECTION INTRAMUSCULAR; INTRAVENOUS AS NEEDED
Status: DISCONTINUED | OUTPATIENT
Start: 2017-12-06 | End: 2017-12-06 | Stop reason: HOSPADM

## 2017-12-06 RX ORDER — FLUMAZENIL 0.1 MG/ML
0.2 INJECTION INTRAVENOUS AS NEEDED
Status: DISCONTINUED | OUTPATIENT
Start: 2017-12-06 | End: 2017-12-06 | Stop reason: HOSPADM

## 2017-12-06 RX ORDER — LABETALOL HYDROCHLORIDE 5 MG/ML
5 INJECTION, SOLUTION INTRAVENOUS
Status: DISCONTINUED | OUTPATIENT
Start: 2017-12-06 | End: 2017-12-06 | Stop reason: HOSPADM

## 2017-12-06 RX ORDER — LIDOCAINE HYDROCHLORIDE AND EPINEPHRINE 10; 10 MG/ML; UG/ML
INJECTION, SOLUTION INFILTRATION; PERINEURAL AS NEEDED
Status: DISCONTINUED | OUTPATIENT
Start: 2017-12-06 | End: 2017-12-06 | Stop reason: HOSPADM

## 2017-12-06 RX ORDER — MIDAZOLAM HYDROCHLORIDE 1 MG/ML
1 INJECTION INTRAMUSCULAR; INTRAVENOUS
Status: DISCONTINUED | OUTPATIENT
Start: 2017-12-06 | End: 2017-12-06 | Stop reason: HOSPADM

## 2017-12-06 RX ORDER — GLYCOPYRROLATE 0.2 MG/ML
INJECTION INTRAMUSCULAR; INTRAVENOUS AS NEEDED
Status: DISCONTINUED | OUTPATIENT
Start: 2017-12-06 | End: 2017-12-06 | Stop reason: SURG

## 2017-12-06 RX ORDER — SODIUM CHLORIDE 0.9 % (FLUSH) 0.9 %
3 SYRINGE (ML) INJECTION AS NEEDED
Status: DISCONTINUED | OUTPATIENT
Start: 2017-12-06 | End: 2017-12-06 | Stop reason: HOSPADM

## 2017-12-06 RX ORDER — BUPIVACAINE HYDROCHLORIDE 2.5 MG/ML
INJECTION, SOLUTION EPIDURAL; INFILTRATION; INTRACAUDAL AS NEEDED
Status: DISCONTINUED | OUTPATIENT
Start: 2017-12-06 | End: 2017-12-06 | Stop reason: HOSPADM

## 2017-12-06 RX ORDER — SODIUM CHLORIDE 0.9 % (FLUSH) 0.9 %
1-10 SYRINGE (ML) INJECTION AS NEEDED
Status: DISCONTINUED | OUTPATIENT
Start: 2017-12-06 | End: 2017-12-06 | Stop reason: HOSPADM

## 2017-12-06 RX ORDER — SODIUM CHLORIDE, SODIUM LACTATE, POTASSIUM CHLORIDE, CALCIUM CHLORIDE 600; 310; 30; 20 MG/100ML; MG/100ML; MG/100ML; MG/100ML
1000 INJECTION, SOLUTION INTRAVENOUS CONTINUOUS
Status: DISCONTINUED | OUTPATIENT
Start: 2017-12-06 | End: 2017-12-06 | Stop reason: HOSPADM

## 2017-12-06 RX ORDER — METOCLOPRAMIDE HYDROCHLORIDE 5 MG/ML
5 INJECTION INTRAMUSCULAR; INTRAVENOUS
Status: DISCONTINUED | OUTPATIENT
Start: 2017-12-06 | End: 2017-12-06 | Stop reason: HOSPADM

## 2017-12-06 RX ORDER — NALOXONE HCL 0.4 MG/ML
0.04 VIAL (ML) INJECTION AS NEEDED
Status: DISCONTINUED | OUTPATIENT
Start: 2017-12-06 | End: 2017-12-06 | Stop reason: HOSPADM

## 2017-12-06 RX ORDER — ONDANSETRON 2 MG/ML
INJECTION INTRAMUSCULAR; INTRAVENOUS AS NEEDED
Status: DISCONTINUED | OUTPATIENT
Start: 2017-12-06 | End: 2017-12-06 | Stop reason: SURG

## 2017-12-06 RX ORDER — SODIUM CHLORIDE, SODIUM LACTATE, POTASSIUM CHLORIDE, CALCIUM CHLORIDE 600; 310; 30; 20 MG/100ML; MG/100ML; MG/100ML; MG/100ML
100 INJECTION, SOLUTION INTRAVENOUS CONTINUOUS
Status: DISCONTINUED | OUTPATIENT
Start: 2017-12-06 | End: 2017-12-06 | Stop reason: HOSPADM

## 2017-12-06 RX ORDER — HYDRALAZINE HYDROCHLORIDE 20 MG/ML
5 INJECTION INTRAMUSCULAR; INTRAVENOUS
Status: DISCONTINUED | OUTPATIENT
Start: 2017-12-06 | End: 2017-12-06 | Stop reason: HOSPADM

## 2017-12-06 RX ORDER — MORPHINE SULFATE 2 MG/ML
2 INJECTION, SOLUTION INTRAMUSCULAR; INTRAVENOUS AS NEEDED
Status: DISCONTINUED | OUTPATIENT
Start: 2017-12-06 | End: 2017-12-06 | Stop reason: HOSPADM

## 2017-12-06 RX ORDER — FENTANYL CITRATE 50 UG/ML
INJECTION, SOLUTION INTRAMUSCULAR; INTRAVENOUS AS NEEDED
Status: DISCONTINUED | OUTPATIENT
Start: 2017-12-06 | End: 2017-12-06 | Stop reason: SURG

## 2017-12-06 RX ORDER — HYDROMORPHONE HYDROCHLORIDE 2 MG/1
2 TABLET ORAL
Qty: 25 TABLET | Refills: 0 | Status: SHIPPED | OUTPATIENT
Start: 2017-12-06 | End: 2017-12-19

## 2017-12-06 RX ORDER — DEXAMETHASONE SODIUM PHOSPHATE 4 MG/ML
4 INJECTION, SOLUTION INTRA-ARTICULAR; INTRALESIONAL; INTRAMUSCULAR; INTRAVENOUS; SOFT TISSUE ONCE AS NEEDED
Status: COMPLETED | OUTPATIENT
Start: 2017-12-06 | End: 2017-12-06

## 2017-12-06 RX ORDER — HYDROMORPHONE HYDROCHLORIDE 2 MG/1
2 TABLET ORAL
Status: DISCONTINUED | OUTPATIENT
Start: 2017-12-06 | End: 2017-12-06 | Stop reason: HOSPADM

## 2017-12-06 RX ORDER — ONDANSETRON 2 MG/ML
4 INJECTION INTRAMUSCULAR; INTRAVENOUS ONCE AS NEEDED
Status: DISCONTINUED | OUTPATIENT
Start: 2017-12-06 | End: 2017-12-06 | Stop reason: HOSPADM

## 2017-12-06 RX ADMIN — FENTANYL CITRATE 100 MCG: 50 INJECTION, SOLUTION INTRAMUSCULAR; INTRAVENOUS at 14:16

## 2017-12-06 RX ADMIN — SUFENTANIL CITRATE 25 MCG: 50 INJECTION, SOLUTION EPIDURAL; INTRAVENOUS at 13:22

## 2017-12-06 RX ADMIN — SODIUM CHLORIDE, POTASSIUM CHLORIDE, SODIUM LACTATE AND CALCIUM CHLORIDE 30 ML/HR: 600; 310; 30; 20 INJECTION, SOLUTION INTRAVENOUS at 10:25

## 2017-12-06 RX ADMIN — SUCCINYLCHOLINE CHLORIDE 100 MG: 20 INJECTION, SOLUTION INTRAMUSCULAR; INTRAVENOUS at 13:03

## 2017-12-06 RX ADMIN — ROCURONIUM BROMIDE 40 MG: 10 INJECTION INTRAVENOUS at 13:12

## 2017-12-06 RX ADMIN — HYDROMORPHONE HYDROCHLORIDE 2 MG: 2 TABLET ORAL at 17:13

## 2017-12-06 RX ADMIN — SCOPALAMINE 1 PATCH: 1 PATCH, EXTENDED RELEASE TRANSDERMAL at 12:48

## 2017-12-06 RX ADMIN — PROPOFOL 100 MG: 10 INJECTION, EMULSION INTRAVENOUS at 14:16

## 2017-12-06 RX ADMIN — SODIUM CHLORIDE, POTASSIUM CHLORIDE, SODIUM LACTATE AND CALCIUM CHLORIDE: 600; 310; 30; 20 INJECTION, SOLUTION INTRAVENOUS at 14:20

## 2017-12-06 RX ADMIN — ROCURONIUM BROMIDE 10 MG: 10 INJECTION INTRAVENOUS at 14:16

## 2017-12-06 RX ADMIN — SUFENTANIL CITRATE 25 MCG: 50 INJECTION, SOLUTION EPIDURAL; INTRAVENOUS at 13:02

## 2017-12-06 RX ADMIN — LIDOCAINE HYDROCHLORIDE 0.5 ML: 10 INJECTION, SOLUTION EPIDURAL; INFILTRATION; INTRACAUDAL; PERINEURAL at 10:25

## 2017-12-06 RX ADMIN — Medication 4 MG: at 14:30

## 2017-12-06 RX ADMIN — SODIUM CHLORIDE, POTASSIUM CHLORIDE, SODIUM LACTATE AND CALCIUM CHLORIDE 1000 ML: 600; 310; 30; 20 INJECTION, SOLUTION INTRAVENOUS at 10:29

## 2017-12-06 RX ADMIN — MIDAZOLAM HYDROCHLORIDE 2 MG: 1 INJECTION, SOLUTION INTRAMUSCULAR; INTRAVENOUS at 12:49

## 2017-12-06 RX ADMIN — Medication 160 MCG: at 13:15

## 2017-12-06 RX ADMIN — ROCURONIUM BROMIDE 10 MG: 10 INJECTION INTRAVENOUS at 13:02

## 2017-12-06 RX ADMIN — GLYCOPYRROLATE 0.6 MG: 0.2 INJECTION, SOLUTION INTRAMUSCULAR; INTRAVENOUS at 14:30

## 2017-12-06 RX ADMIN — PHENAZOPYRIDINE HYDROCHLORIDE 100 MG: 100 TABLET ORAL at 10:16

## 2017-12-06 RX ADMIN — EPHEDRINE SULFATE 10 MG: 50 INJECTION INTRAMUSCULAR; INTRAVENOUS; SUBCUTANEOUS at 14:35

## 2017-12-06 RX ADMIN — ONDANSETRON HYDROCHLORIDE 4 MG: 2 SOLUTION INTRAMUSCULAR; INTRAVENOUS at 14:20

## 2017-12-06 RX ADMIN — LIDOCAINE HYDROCHLORIDE 100 MG: 20 INJECTION, SOLUTION INFILTRATION; PERINEURAL at 13:02

## 2017-12-06 RX ADMIN — PROPOFOL 150 MG: 10 INJECTION, EMULSION INTRAVENOUS at 13:02

## 2017-12-06 RX ADMIN — DEXAMETHASONE SODIUM PHOSPHATE 4 MG: 4 INJECTION, SOLUTION INTRAMUSCULAR; INTRAVENOUS at 12:49

## 2017-12-06 RX ADMIN — DEXAMETHASONE SODIUM PHOSPHATE 4 MG: 4 INJECTION, SOLUTION INTRAMUSCULAR; INTRAVENOUS at 14:20

## 2017-12-06 RX ADMIN — CEFOXITIN 2 G: 2 INJECTION, POWDER, FOR SOLUTION INTRAVENOUS at 13:09

## 2017-12-06 NOTE — DISCHARGE INSTRUCTIONS

## 2017-12-06 NOTE — ANESTHESIA PROCEDURE NOTES
Airway  Urgency: elective    Date/Time: 12/6/2017 1:04 PM  End Time:12/6/2017 1:04 PM  Airway not difficult    General Information and Staff    Patient location during procedure: OR  CRNA: CANELO BARRERA    Indications and Patient Condition  Indications for airway management: airway protection    Preoxygenated: yes  MILS maintained throughout  Mask difficulty assessment: 1 - vent by mask    Final Airway Details  Final airway type: endotracheal airway      Successful airway: ETT  Cuffed: yes   Successful intubation technique: direct laryngoscopy  Facilitating devices/methods: Bougie  Endotracheal tube insertion site: oral  Blade: Goldman  Blade size: #2  ETT size: 7.0 mm  Cormack-Lehane Classification: grade IIa - partial view of glottis  Placement verified by: chest auscultation and capnometry   Cuff volume (mL): 8  Measured from: lips  ETT to lips (cm): 22  Number of attempts at approach: 1

## 2017-12-06 NOTE — PLAN OF CARE
Problem: Patient Care Overview (Adult)  Goal: Plan of Care Review  Outcome: Ongoing (interventions implemented as appropriate)    12/06/17 7469   Patient Care Overview   Progress improving   Outcome Evaluation   Outcome Summary/Follow up Plan met pacu dc criteria   Coping/Psychosocial Response Interventions   Plan Of Care Reviewed With patient         Problem: Perioperative Period (Adult)  Goal: Signs and Symptoms of Listed Potential Problems Will be Absent or Manageable (Perioperative Period)  Outcome: Ongoing (interventions implemented as appropriate)

## 2017-12-06 NOTE — H&P (VIEW-ONLY)
[facility]  Alyssa Randhawa  : 1965  MRN: [id number]  CSN: [id number]    History and Physical    Subjective   Alyssa Randhawa is a 52 y.o. year old  who presents for consultation due to menorrhagia and dysmenorrhea.  She is para 2 and has had 2 vaginal deliveries.  She has had a previous endometrial ablation, but has had return of her symptoms.  A recent trial of therapy with a Mirena IUD was unsuccessful.    Past Medical History:   Diagnosis Date   • Arthritis    • Bone spur     spine   • Heel spur      Past Surgical History:   Procedure Laterality Date   • CHOLECYSTECTOMY     • ENDOMETRIAL ABLATION     • KNEE MENISCAL REPAIR Left    • LEG SURGERY Right     spiral fracture    • WISDOM TOOTH EXTRACTION       Smoking status: Former Smoker                                                              Packs/day: 0.00      Years: 0.00      Smokeless status: Never Used                          Current Outpatient Prescriptions:   •  Calcium-Iron-Vit D-Vit K (CALCIUM SOFT CHEWS) [id number] MG-UNT-MCG chewable tablet, Chew., Disp: , Rfl:   •  magnesium gluconate (MAGONATE) 500 MG tablet, Take 500 mg by mouth 2 (Two) Times a Day., Disp: , Rfl:   •  Multiple Vitamin (MULTI VITAMIN DAILY PO), Take 1 tablet by mouth Daily., Disp: , Rfl:   •  Unable to find, Take 1 each by mouth 1 (One) Time. Wild cherry extract, Disp: , Rfl:     No Known Allergies    Family History   Problem Relation Age of Onset   • Breast cancer Sister    • Hypertension Father    • Coronary artery disease Mother    • Hypertension Mother    • Diabetes Mother    • Ovarian cancer Neg Hx    • Uterine cancer Neg Hx    • Colon cancer Neg Hx    • Melanoma Neg Hx      Review of Systems   Constitutional: Negative for unexpected weight change.   HENT: Negative for trouble swallowing.    Respiratory: Negative for shortness of breath.    Cardiovascular: Negative for chest pain.   Genitourinary: Positive for menstrual problem.   Musculoskeletal: Negative for neck  "stiffness.   Neurological: Negative for seizures.         Objective   /80  Ht 63\" (160 cm)  Wt 233 lb (106 kg)  LMP 11/08/2017  BMI 41.27 kg/m2    Physical Exam   Physical Exam   Constitutional: She is oriented to person, place, and time. She appears well-developed and well-nourished.   HENT:   Head: Normocephalic and atraumatic.   Eyes: No scleral icterus.   Neck: No tracheal deviation present.   Cardiovascular: Normal rate and regular rhythm.    Pulmonary/Chest: Effort normal and breath sounds normal.   Abdominal: Soft. Bowel sounds are normal. She exhibits no distension. There is no tenderness.   Genitourinary: Uterus normal. Rectal exam shows no external hemorrhoid. Pelvic exam was performed with patient supine. There is no lesion on the right labia. There is no lesion on the left labia. Uterus is not enlarged, not fixed and not tender. Cervix exhibits no motion tenderness. Right adnexum displays no mass. Left adnexum displays no mass. No bleeding in the vagina. No vaginal discharge found.   Lymphadenopathy:        Right: No inguinal adenopathy present.        Left: No inguinal adenopathy present.   Neurological: She is alert and oriented to person, place, and time.   Skin: Skin is warm and dry.   Vitals reviewed.      Labs  No results found for: HCG, PLT, HGB, HCT, WBC, NA, K, CL, CO2, BUN, CREATININE, GLUCOSE, ALBUMIN, CALCIUM, AST, ALT, BILITOT     Assessment & Plan    Alyssa was seen today for follow-up.    Diagnoses and all orders for this visit:    Menorrhagia with regular cycle  -     Case Request  -     CBC (No Diff); Future  -     Type and screen; Future  -     sodium chloride 0.9 % infusion; Infuse 100 mL/hr into a venous catheter Continuous.  -     Basic Metabolic Panel; Future  -     ECG 12 Lead; Future  -     XR Chest 1 View; Future  -     cefOXitin (MEFOXIN) 2 g in sodium chloride 0.9 % 100 mL IVPB; Infuse 2 g into a venous catheter 1 (One) Time.  -     phenazopyridine (PYRIDIUM) tablet 100 " mg; Take 1 tablet by mouth 1 (One) Time.    Dysmenorrhea  -     Case Request  -     CBC (No Diff); Future  -     Type and screen; Future  -     sodium chloride 0.9 % infusion; Infuse 100 mL/hr into a venous catheter Continuous.  -     Basic Metabolic Panel; Future  -     ECG 12 Lead; Future  -     XR Chest 1 View; Future  -     cefOXitin (MEFOXIN) 2 g in sodium chloride 0.9 % 100 mL IVPB; Infuse 2 g into a venous catheter 1 (One) Time.  -     phenazopyridine (PYRIDIUM) tablet 100 mg; Take 1 tablet by mouth 1 (One) Time.    Other orders  -     Follow Anesthesia Guidelines / Standing Orders; Future  -     Provide instructions to patient on NPO status  -     Follow Anesthesia Guidelines / Standing Orders; Standing  -     Verify NPO Status; Standing  -     Obtain informed consent; Standing  -     SCD (sequential compression device)- to be placed on patient in Pre-op; Standing  -     Clip operative site; Standing  -     Have patient void prior to transfer; Standing  -     Type & Screen; Standing    Risks, benefits, and alternatives of a hysterectomy were discussed with the patient in detail. Intraoperative risks of bleeding and damage to surrounding organs, including but not limited to intestine, bladder and ureter, were explained. Management of these were also explained. Postoperative complications such as infection, pneumonia, DVT, and bleeding were explained. Cuff dehiscence and cuff hematoma/cellulitis were also explained. The importance of compliance with postoperative restrictions was discussed. Long term effect regarding pelvic organ prolapse was also explained. Laparoscopic approach with use of the [place] robotic system was explained. Indications for conversion to a laparotomy were discussed.     Ovarian conservation/removal was also discussed. Removal of fallopian tubes regardless of ovarian removal was discussed and patient verbalized understanding.    All of the patient's questions were answered to her  satisfaction. She was encouraged to return for an additional appointment if she had further questions. She verbalized understanding of the above and wished to proceed with the outlined plan.      Greg Mejía MD  11/21/2017  1:04 PM

## 2017-12-06 NOTE — PLAN OF CARE
Problem: Patient Care Overview (Adult)  Goal: Plan of Care Review  Outcome: Outcome(s) achieved Date Met:  12/06/17 12/06/17 2126   Patient Care Overview   Progress improving   Outcome Evaluation   Outcome Summary/Follow up Plan Meets criteria for DC. Pain tolerable. Ambulated and voided. Tolerated liquids. 3 stabs to abdomen. 1 with scant drainage. Instructions given to patient and family. Home soon.    Coping/Psychosocial Response Interventions   Plan Of Care Reviewed With patient;family         Problem: Perioperative Period (Adult)  Goal: Signs and Symptoms of Listed Potential Problems Will be Absent or Manageable (Perioperative Period)  Outcome: Outcome(s) achieved Date Met:  12/06/17

## 2017-12-06 NOTE — ANESTHESIA POSTPROCEDURE EVALUATION
Patient: Aylssa Randhawa    Procedure Summary     Date Anesthesia Start Anesthesia Stop Room / Location    12/06/17 1256 1454 [facility] OR 14 / [facility] OR       Procedure Diagnosis Surgeon Provider    TOTAL LAPAROSCOPIC HYSTERECTOMY BILATERAL SALPINGECTOMY WITH DAVINCI SI ROBOT, CYSTO (Bilateral Abdomen) Dysmenorrhea; Menorrhagia with regular cycle  (Dysmenorrhea [N94.6]; Menorrhagia with regular cycle [N92.0]) MD Danny Bo CRNA          Anesthesia Type: general  Last vitals  BP   124/68 (12/06/17 1645)   Temp   97.7 °F (36.5 °C) (12/06/17 1620)   Pulse   96 (12/06/17 1645)   Resp   16 (12/06/17 1645)     SpO2   92 % (12/06/17 1645)     Post Anesthesia Care and Evaluation    Patient location during evaluation: PACU  Patient participation: complete - patient participated  Level of consciousness: awake and alert  Pain management: adequate  Airway patency: patent  Anesthetic complications: No anesthetic complications  PONV Status: controlled  Cardiovascular status: acceptable and hemodynamically stable  Respiratory status: acceptable  Hydration status: acceptable    Comments: Patient discharged from PACU prior to anesthesia evaluation based on Norma Score.  For details, see RN note.     /68  Pulse 96  Temp 97.7 °F (36.5 °C) (Temporal Artery )   Resp 16  LMP 12/03/2017  SpO2 92%

## 2017-12-06 NOTE — OP NOTE
[facility]  Alyssa Randhawa  : 1965  MRN: [id number]  [facility]: [id number]  Date: 2017    Operative Note    TOTAL LAPAROSCOPIC HYSTERECTOMY BILATERAL SALPINGECTOMY WITH DAVINCI SI ROBOT, CYSTO      Pre-op Diagnosis:  Dysmenorrhea [N94.6]  Menorrhagia with regular cycle [N92.0]   Post-op Diagnosis:  Post-Op Diagnosis Codes:     * Dysmenorrhea [N94.6]     * Menorrhagia with regular cycle [N92.0]   Procedure: Procedure(s):  TOTAL LAPAROSCOPIC HYSTERECTOMY BILATERAL SALPINGECTOMY WITH DAVINCI SI ROBOT, CYSTO   Surgeon: Surgeon(s):  Greg Mejía MD       Anesthesia: General     Estimated Blood Loss: Less than 50   mls   Fluids: 1700   mls   UOP: 550   mls   Drains: Melchor   ABx: Cefoxitin     Specimens:  Uterus tubes and cervix   Findings: Enlarged bulky uterus, normal tubes and ovaries   Complications: none      INDICATIONS: Alyssa Randhawa is a 52 y.o. female with severe dysmenorrhea who has chosen definitive therapy with hysterectomy.      PROCEDURE: After informed consent was obtained, the patient was taken to the operating room where general anesthesia was administered. She was placed in the lithotomy position in [facility] and her abdomen, perineum and vagina were prepped and draped in normal sterile fashion. The skin just above the umbilicus was infiltrated with 0.25% MARCAINE solution and then incised with a scalpel approximately 1.5 cm in length. The subcutaneous tissues were divided bluntly. The abdominal wall was then elevated and the Veress needle placed into the peritoneal cavity without difficulty. Correct placement was confirmed with water drop test and measurement of gas pressures and flow. After insufflating the abdomen with carbon dioxide, the Veress needle was removed and a 12-mm, tissue-, blunt disposable trocar was placed into the abdominal cavity while tenting the abdominal wall. Correct placement was confirmed by visualization with the laparoscope. A right upper quadrant port  was placed in a similar fashion using MARCAINE at the skin, incising and then placing a 8-mm AirSeal port under direct visualization with the laparoscope. Two robotic ports were placed in the lower quadrants, one on each side. The skin was infiltrated with MARCAINE, incised with the scalpel and then the port placed under direct visualization with the laparoscope. Attention was then turned to the vagina. A speculum was placed and the cervix visualized and grasped with a tenaculum. The cervix was dilated and a medium VCare uterine manipulator was placed into the uterine cavity although depth was limited due to her prior ablation. The balloon was inflated. The speculum and tenaculum were removed. The cervical cap was then advanced and secured in place with 2 sutures of 0 Vicryl. The robotic cart was advanced and docked without difficulty. Monopolar scissors were placed in the right arm and bipolar PK fenestrated forceps were placed in the left arm. I then moved to the console to perform the hysterectomy.   The pelvis was inspected with the findings noted above. Each fallopian tube was grasped and elevated and the mesosalpinx cauterized and divided to the uterine cornua and excised and removed. The uteroovarian ligaments were cauterized with bipolar energy and then transected with the scissors. The round ligaments were cauterized and transected with monopolar energy as well. The anterior and posterior leaves of the broad ligaments were then easily dissected separately to skeletonize the uterine vessels. There was a nice plane visible with the bladder flap, which was undermined with the bipolar forceps and then incised with monopolar energy. The bladder was then mobilized off the anterior aspect of the cervix, well below the cervical cap, which was easily visible. The uterine vessels on each side were then cauterized with bipolar energy. They were then transected with the scissors. Several more small bites with the  bipolar forceps ensured good hemostasis and mobilization off of the cervix. The uterosacral ligament insertions to the posterior aspect of the uterus were cauterized with bipolar energy and then transected with the monopolar scissors. The uterus was dusky in appearance and colpotomy was then performed initially anteriorly. The monopolar scissors were used to incise the vagina until vaginal entry was obtained at the cervical cap. This was the followed around bilaterally using monopolar energy. The uterus was then rotated anteriorly and the posterior incision was finished with the monopolar scissors. Once the cervix was completely excised from the upper vagina, the cervix and uterus were removed through the colpotomy incision. The monopolar scissors were then changed out for a needle  and a suture of 0 Quill was used to close the cuff. The suture was placed starting at the right corner, taking a full thickness bite anteriorly and posteriorly. We proceeded in a running fashion to the left corner, which was brought up well and secured during the process. The needle was then reversed and several throws made to secure the suture in place prior to removing the needle. The vaginal cuff was then irrigated and inspected with good hemostasis noted. The procedure was then terminated, the instruments were removed and the robotic cart undocked and moved away from the patient. The gas flow was discontinued and all gas allowed to escape through the umbilical port. The trocars were removed. The skin incisions were closed with subcuticular 3-0 Vicryl Rapide and reinforced with Steri-Strips.  The vagina was then inspected with the speculum and the cuff was noted to be hemostatic and intact. The gutierrez was removed and the cystoscope placed in the bladder. About 200mL normal saline was used to distend the bladder. The bladder was inspected and noted to be intact and without obvious lesions. The ureters were identified bilaterally  and were observed to be effluxing clear urine. The cystoscope was removed and a new Melchor placed. The patient was then awakened and taken to the recovery room in stable condition. She tolerated the procedure well without complications. All sponge, needle and instrument counts were correct times 3 per the operating room staff.    Greg Mejía MD   12/6/2017  2:31 PM

## 2017-12-06 NOTE — ANESTHESIA PREPROCEDURE EVALUATION
Anesthesia Evaluation     Patient summary reviewed   history of anesthetic complications: PONV  NPO Solid Status: > 8 hours  NPO Liquid Status: > 8 hours     Airway   Mallampati: I  TM distance: >3 FB  Neck ROM: full  Dental - normal exam     Comment: Multiple permanent crowns    Pulmonary - normal exam    breath sounds clear to auscultation  (-) asthma, recent URI, sleep apnea, not a smoker  Cardiovascular - normal exam  Exercise tolerance: good (4-7 METS)    ECG reviewed  Rhythm: regular  Rate: normal    (-) pacemaker, hypertension, past MI, angina, cardiac stents, CABG      Neuro/Psych  (-) seizures, TIA, CVA  GI/Hepatic/Renal/Endo    (-) GERD, liver disease, no renal disease, diabetes, hypothyroidism, hyperthyroidism    Musculoskeletal     Abdominal    Substance History      OB/GYN          Other                                        Anesthesia Plan    ASA 2     general     intravenous induction   Anesthetic plan and risks discussed with patient.

## 2017-12-13 LAB
CYTO UR: NORMAL
LAB AP CASE REPORT: NORMAL
Lab: NORMAL
PATH REPORT.FINAL DX SPEC: NORMAL
PATH REPORT.GROSS SPEC: NORMAL

## 2017-12-19 ENCOUNTER — OFFICE VISIT (OUTPATIENT)
Dept: OBSTETRICS AND GYNECOLOGY | Facility: CLINIC | Age: 52
End: 2017-12-19

## 2017-12-19 VITALS
HEIGHT: 55 IN | BODY MASS INDEX: 54.38 KG/M2 | SYSTOLIC BLOOD PRESSURE: 132 MMHG | DIASTOLIC BLOOD PRESSURE: 86 MMHG | WEIGHT: 235 LBS

## 2017-12-19 DIAGNOSIS — Z09 POSTOP CHECK: Primary | ICD-10-CM

## 2017-12-19 PROBLEM — N94.6 DYSMENORRHEA: Status: RESOLVED | Noted: 2017-11-21 | Resolved: 2017-12-19

## 2017-12-19 PROBLEM — N92.0 MENORRHAGIA WITH REGULAR CYCLE: Status: RESOLVED | Noted: 2017-11-21 | Resolved: 2017-12-19

## 2017-12-19 PROCEDURE — 99024 POSTOP FOLLOW-UP VISIT: CPT | Performed by: OBSTETRICS & GYNECOLOGY

## 2017-12-19 NOTE — PROGRESS NOTES
"Alyssa Randhawa is a 52 y.o. female here today for a postop visit after undergoing robotic hysterectomy on 12/6 for dysmenorrhea.  She has been doing well postoperatively since her procedure and denies any fevers, problems with her incisions, severe pain, or vaginal bleeding.  Her pathology report returned showing adenomyosis and small fibroids.    /86 (BP Location: Left arm, Patient Position: Sitting)  Ht 64 cm (25.2\")  Wt 107 kg (235 lb)  LMP 12/03/2017  .24 kg/m2   In general pleasant female in no acute distress  Abdomen is soft nontender nondistended  Her incisions are well-healed but have a macular rash, especially around the left two. No induration or tenderness, well approximated    Assessment: Normal early postoperative visit after robotic hysterectomy    Alyssa Randhawa will return in 4 weeks for a final postop exam.  We have discussed the benign pathology report. She admits to using lavender oil on her incisions, which may be causing the rash. She will stop using the oil and use hydrocortisone cream.  In the meantime if she has questions or problems she will call the office.   "

## 2018-01-25 ENCOUNTER — OFFICE VISIT (OUTPATIENT)
Dept: OBSTETRICS AND GYNECOLOGY | Facility: CLINIC | Age: 53
End: 2018-01-25

## 2018-01-25 VITALS
SYSTOLIC BLOOD PRESSURE: 124 MMHG | HEIGHT: 64 IN | WEIGHT: 237 LBS | BODY MASS INDEX: 40.46 KG/M2 | DIASTOLIC BLOOD PRESSURE: 84 MMHG

## 2018-01-25 DIAGNOSIS — Z09 POSTOP CHECK: Primary | ICD-10-CM

## 2018-01-25 PROCEDURE — 99024 POSTOP FOLLOW-UP VISIT: CPT | Performed by: OBSTETRICS & GYNECOLOGY

## 2018-01-25 NOTE — PROGRESS NOTES
"Alyssa Randhawa is a 53 y.o. female here today for a postop visit after undergoing robotic hysterectomy on 12/6/17 for dysmenorrhea.  She has been doing well postoperatively since her last visit and denies any fevers, problems with her incisions, pain, or vaginal bleeding.     /84 (BP Location: Left arm, Patient Position: Sitting)  Ht 162.6 cm (64\")  Wt 108 kg (237 lb)  LMP 12/03/2017  BMI 40.68 kg/m2   In general pleasant female in no acute distress  Abdomen is soft nontender nondistended  Her incisions are well-healed  On speculum examination the vaginal cuff is well-healed and nontender    Assessment: Normal postoperative exam after robotic hysterectomy    Alyssa Randhawa will return to normal activities without restriction.  We have discussed current Pap smear screening guidelines.  She may return to the office as needed, and in the meantime if she has questions or problems she will call the office.   "

## 2018-09-24 ENCOUNTER — OFFICE VISIT (OUTPATIENT)
Dept: OBSTETRICS AND GYNECOLOGY | Facility: CLINIC | Age: 53
End: 2018-09-24

## 2018-09-24 VITALS
WEIGHT: 245 LBS | BODY MASS INDEX: 41.83 KG/M2 | HEIGHT: 64 IN | SYSTOLIC BLOOD PRESSURE: 118 MMHG | DIASTOLIC BLOOD PRESSURE: 82 MMHG

## 2018-09-24 DIAGNOSIS — Z80.3 FAMILY HISTORY OF BREAST CANCER IN FIRST DEGREE RELATIVE: ICD-10-CM

## 2018-09-24 DIAGNOSIS — N63.11 BREAST LUMP ON RIGHT SIDE AT 10 O'CLOCK POSITION: Primary | ICD-10-CM

## 2018-09-24 NOTE — PROGRESS NOTES
"Alyssa Randhawa is a 53 y.o. female here today for evaluation of a breast lump.  Yesterday she noticed a tender right breast lump.  She denies nipple discharge or fevers.  Her last mammogram was July 2017 and normal by her report.  Her family history is significant for a sister diagnosed with breast cancer at age 43, who subsequently underwent a right mastectomy.  She has contacted her sister who reports that she has not been tested for BRCA.    Visit Vitals  /82 (BP Location: Left arm, Patient Position: Sitting)   Ht 162.6 cm (64\")   Wt 111 kg (245 lb)   LMP 12/03/2017   BMI 42.05 kg/m²     Pleasant female no acute distress  Mood and affect normal  Breasts are symmetric without skin changes, retractions, or nipple discharge.  Both nipples are mildly inverted.  The breast tissue is palpably dense bilaterally without discrete masses noted of the left breast.  There is a 1.5 cm tender mobile palpable lump of the right breast at 10 o'clock approximately 8 cm from the nipple.  There is no axillary or supraclavicular lymphadenopathy.  Abdomen nontender    Assessment: Right breast lump, history of breast cancer in a first-degree relative under the age of 50    I have ordered a diagnostic mammogram and right breast ultrasound.  I have also recommended that she undergo genetic testing for inheritable breast cancer.  I have initiated the screening with [employer], and she will set up a phone consultation with a genetic counselor prior to proceeding with testing.  We will notify her when the results are available from the imaging.    "

## 2018-09-26 ENCOUNTER — HOSPITAL ENCOUNTER (OUTPATIENT)
Dept: MAMMOGRAPHY | Facility: HOSPITAL | Age: 53
Discharge: HOME | End: 2018-09-26
Attending: OBSTETRICS & GYNECOLOGY | Admitting: OBSTETRICS & GYNECOLOGY

## 2018-09-26 ENCOUNTER — HOSPITAL ENCOUNTER (OUTPATIENT)
Dept: ULTRASOUND IMAGING | Facility: HOSPITAL | Age: 53
Discharge: HOME | End: 2018-09-26
Attending: OBSTETRICS & GYNECOLOGY

## 2018-09-26 DIAGNOSIS — N63.11 BREAST LUMP ON RIGHT SIDE AT 10 O'CLOCK POSITION: ICD-10-CM

## 2018-09-26 PROCEDURE — 77066 DX MAMMO INCL CAD BI: CPT

## 2018-09-26 PROCEDURE — 76641 ULTRASOUND BREAST COMPLETE: CPT

## 2018-09-26 PROCEDURE — G0279 TOMOSYNTHESIS, MAMMO: HCPCS

## 2019-07-31 PROBLEM — E66.01 MORBID OBESITY WITH BODY MASS INDEX (BMI) OF 40.0 TO 44.9 IN ADULT (HCC): Status: ACTIVE | Noted: 2019-07-31

## 2019-08-01 ENCOUNTER — OFFICE VISIT (OUTPATIENT)
Dept: FAMILY MEDICINE CLINIC | Facility: CLINIC | Age: 54
End: 2019-08-01

## 2019-08-01 ENCOUNTER — RESULTS ENCOUNTER (OUTPATIENT)
Dept: FAMILY MEDICINE CLINIC | Facility: CLINIC | Age: 54
End: 2019-08-01

## 2019-08-01 VITALS
HEART RATE: 66 BPM | BODY MASS INDEX: 28.82 KG/M2 | TEMPERATURE: 97.8 F | WEIGHT: 168.8 LBS | DIASTOLIC BLOOD PRESSURE: 79 MMHG | HEIGHT: 64 IN | OXYGEN SATURATION: 96 % | SYSTOLIC BLOOD PRESSURE: 116 MMHG

## 2019-08-01 DIAGNOSIS — Z00.00 ANNUAL PHYSICAL EXAM: Primary | ICD-10-CM

## 2019-08-01 DIAGNOSIS — Z12.11 COLON CANCER SCREENING: ICD-10-CM

## 2019-08-01 DIAGNOSIS — Z13.21 ENCOUNTER FOR VITAMIN DEFICIENCY SCREENING: ICD-10-CM

## 2019-08-01 DIAGNOSIS — E66.3 OVERWEIGHT (BMI 25.0-29.9): ICD-10-CM

## 2019-08-01 DIAGNOSIS — Z13.220 LIPID SCREENING: ICD-10-CM

## 2019-08-01 PROBLEM — M19.90 ARTHRITIS: Status: ACTIVE | Noted: 2019-08-01

## 2019-08-01 LAB
BILIRUB BLD-MCNC: NEGATIVE MG/DL
CLARITY, POC: CLEAR
COLOR UR: YELLOW
GLUCOSE UR STRIP-MCNC: NEGATIVE MG/DL
KETONES UR QL: NEGATIVE
LEUKOCYTE EST, POC: NEGATIVE
NITRITE UR-MCNC: NEGATIVE MG/ML
PH UR: 7.5 [PH] (ref 5–8)
PROT UR STRIP-MCNC: NEGATIVE MG/DL
RBC # UR STRIP: NEGATIVE /UL
SP GR UR: 1.01 (ref 1–1.03)
UROBILINOGEN UR QL: NORMAL

## 2019-08-01 PROCEDURE — 99386 PREV VISIT NEW AGE 40-64: CPT | Performed by: NURSE PRACTITIONER

## 2019-08-01 PROCEDURE — 81003 URINALYSIS AUTO W/O SCOPE: CPT | Performed by: NURSE PRACTITIONER

## 2019-08-01 NOTE — PROGRESS NOTES
CC: establish care - annual physical    History:  Alyssa Randhawa is a 54 y.o. female who presents today for evaluation of the above problems.    54-year-old female here for annual physical.  Started [employer] health program December and has lost 80 pounds.  This program consist of meal replacements and exercise, as well as lifestyle modification education.  Her BMI was 42 before starting this and is now down to 29.  She has seen improvements in her low back pain as a result of her weight loss.  She did have a hysterectomy this past fall by Dr. Mejía and is having no current complications from this.  She is unsure if her ovaries were removed as part of the operation.  She reports that she did have a colonoscopy at age 51 by Dr. Lang at [facility].  She states that this was normal and was given a 10-year recall.  She does have a history of some chronic anxiety, however, she self manages this successfully with prayer and meditation.  Her only complaint today is some occasional lightheadedness in the morning before eating.  Her last mammogram was in September 2018.      ROS:  Review of Systems   Respiratory: Negative for shortness of breath.    Cardiovascular: Negative for chest pain.   Gastrointestinal: Negative for constipation, diarrhea, nausea and vomiting.   Neurological: Positive for light-headedness.   Psychiatric/Behavioral: Negative for dysphoric mood. The patient is nervous/anxious.        No Known Allergies  Past Medical History:   Diagnosis Date   • Arthritis    • Bone spur     spine   • Ear infection    • Heel spur    • PONV (postoperative nausea and vomiting)    • Wears glasses      Past Surgical History:   Procedure Laterality Date   • CHOLECYSTECTOMY     • ENDOMETRIAL ABLATION     • KNEE MENISCAL REPAIR Left    • LEG SURGERY Right     spiral fracture    • TOTAL LAPAROSCOPIC HYSTERECTOMY SALPINGO OOPHORECTOMY Bilateral 12/6/2017    Procedure: TOTAL LAPAROSCOPIC HYSTERECTOMY BILATERAL  "SALPINGECTOMY WITH DAVINCI SI ROBOT, CYSTO;  Surgeon: Greg Mejía MD;  Location: [facility] OR;  Service:    • WISDOM TOOTH EXTRACTION       Family History   Problem Relation Age of Onset   • Breast cancer Sister    • Hypertension Father    • Coronary artery disease Mother    • Hypertension Mother    • Diabetes Mother    • Ovarian cancer Neg Hx    • Uterine cancer Neg Hx    • Colon cancer Neg Hx    • Melanoma Neg Hx       reports that she quit smoking about 20 years ago. Her smoking use included cigarettes. She has never used smokeless tobacco. She reports that she does not drink alcohol or use drugs.      Current Outpatient Medications:   •  NON FORMULARY, Take 500 mg by mouth Daily. Tumeric 500 mg daily , Disp: , Rfl:   •  NON FORMULARY, , Disp: , Rfl:   •  NON FORMULARY, , Disp: , Rfl:     OBJECTIVE:  /79 (BP Location: Left arm, Patient Position: Sitting, Cuff Size: Adult)   Pulse 66   Temp 97.8 °F (36.6 °C) (Temporal)   Ht 162.6 cm (64\")   Wt 76.6 kg (168 lb 12.8 oz)   LMP 12/03/2017   SpO2 96%   Breastfeeding? No   BMI 28.97 kg/m²    Physical Exam   Constitutional: She is oriented to person, place, and time. Vital signs are normal. She appears well-developed and well-nourished.   Neck: Carotid bruit is not present.   Cardiovascular: Normal rate and regular rhythm.   Pulmonary/Chest: Effort normal and breath sounds normal.   Abdominal: Soft. Bowel sounds are normal.   Neurological: She is alert and oriented to person, place, and time.   Psychiatric: She has a normal mood and affect. Her behavior is normal.   Vitals reviewed.      Assessment/Plan    Alyssa was seen today for annual exam.    Diagnoses and all orders for this visit:    Annual physical exam    Colon cancer screening  -     Cologuard - Stool, Per Rectum; Future  We will request copy of colonoscopy from [facility].    Lipid screening  -     Lipid Panel    Encounter for vitamin deficiency screening  -     Vitamin D 25 " Hydroxy    Overweight (BMI 25.0-29.9)  -     CBC Auto Differential  -     Comprehensive Metabolic Panel  -     TSH  -     POCT urinalysis dipstick, multipro    An After Visit Summary was printed and given to the patient at discharge.  Return in about 2 months (around 10/1/2019) for Well woman exam. (Pelvic exam [ovaries were left intact] and breast exam.)       CHERRIE Emerson 08/01/2019    Electronically signed.

## 2019-08-02 LAB
25(OH)D3+25(OH)D2 SERPL-MCNC: 70.6 NG/ML (ref 30–100)
ALBUMIN SERPL-MCNC: 4.6 G/DL (ref 3.5–5.2)
ALBUMIN/GLOB SERPL: 2.2 G/DL
ALP SERPL-CCNC: 97 U/L (ref 39–117)
ALT SERPL-CCNC: 30 U/L (ref 1–33)
AST SERPL-CCNC: 19 U/L (ref 1–32)
BASOPHILS # BLD AUTO: 0.03 10*3/MM3 (ref 0–0.2)
BASOPHILS NFR BLD AUTO: 0.5 % (ref 0–1.5)
BILIRUB SERPL-MCNC: 0.4 MG/DL (ref 0.2–1.2)
BUN SERPL-MCNC: 19 MG/DL (ref 6–20)
BUN/CREAT SERPL: 22.9 (ref 7–25)
CALCIUM SERPL-MCNC: 9.4 MG/DL (ref 8.6–10.5)
CHLORIDE SERPL-SCNC: 102 MMOL/L (ref 98–107)
CHOLEST SERPL-MCNC: 167 MG/DL (ref 0–200)
CO2 SERPL-SCNC: 27.2 MMOL/L (ref 22–29)
CREAT SERPL-MCNC: 0.83 MG/DL (ref 0.57–1)
EOSINOPHIL # BLD AUTO: 0.07 10*3/MM3 (ref 0–0.4)
EOSINOPHIL NFR BLD AUTO: 1.2 % (ref 0.3–6.2)
ERYTHROCYTE [DISTWIDTH] IN BLOOD BY AUTOMATED COUNT: 12.9 % (ref 12.3–15.4)
GLOBULIN SER CALC-MCNC: 2.1 GM/DL
GLUCOSE SERPL-MCNC: 102 MG/DL (ref 65–99)
HCT VFR BLD AUTO: 47.1 % (ref 34–46.6)
HDLC SERPL-MCNC: 56 MG/DL (ref 40–60)
HGB BLD-MCNC: 14.5 G/DL (ref 12–15.9)
IMM GRANULOCYTES # BLD AUTO: 0.01 10*3/MM3 (ref 0–0.05)
IMM GRANULOCYTES NFR BLD AUTO: 0.2 % (ref 0–0.5)
LDLC SERPL CALC-MCNC: 94 MG/DL (ref 0–100)
LYMPHOCYTES # BLD AUTO: 1.97 10*3/MM3 (ref 0.7–3.1)
LYMPHOCYTES NFR BLD AUTO: 33.5 % (ref 19.6–45.3)
MCH RBC QN AUTO: 28.7 PG (ref 26.6–33)
MCHC RBC AUTO-ENTMCNC: 30.8 G/DL (ref 31.5–35.7)
MCV RBC AUTO: 93.3 FL (ref 79–97)
MONOCYTES # BLD AUTO: 0.4 10*3/MM3 (ref 0.1–0.9)
MONOCYTES NFR BLD AUTO: 6.8 % (ref 5–12)
NEUTROPHILS # BLD AUTO: 3.4 10*3/MM3 (ref 1.7–7)
NEUTROPHILS NFR BLD AUTO: 57.8 % (ref 42.7–76)
NRBC BLD AUTO-RTO: 0.2 /100 WBC (ref 0–0.2)
PLATELET # BLD AUTO: 288 10*3/MM3 (ref 140–450)
POTASSIUM SERPL-SCNC: 4.6 MMOL/L (ref 3.5–5.2)
PROT SERPL-MCNC: 6.7 G/DL (ref 6–8.5)
RBC # BLD AUTO: 5.05 10*6/MM3 (ref 3.77–5.28)
SODIUM SERPL-SCNC: 143 MMOL/L (ref 136–145)
TRIGL SERPL-MCNC: 87 MG/DL (ref 0–150)
TSH SERPL DL<=0.005 MIU/L-ACNC: 2.04 MIU/ML (ref 0.27–4.2)
VLDLC SERPL CALC-MCNC: 17.4 MG/DL
WBC # BLD AUTO: 5.88 10*3/MM3 (ref 3.4–10.8)

## 2019-10-15 ENCOUNTER — OFFICE VISIT (OUTPATIENT)
Dept: FAMILY MEDICINE CLINIC | Facility: CLINIC | Age: 54
End: 2019-10-15

## 2019-10-15 VITALS
HEIGHT: 64 IN | HEART RATE: 64 BPM | OXYGEN SATURATION: 97 % | WEIGHT: 168.2 LBS | SYSTOLIC BLOOD PRESSURE: 106 MMHG | TEMPERATURE: 98 F | BODY MASS INDEX: 28.71 KG/M2 | DIASTOLIC BLOOD PRESSURE: 69 MMHG

## 2019-10-15 DIAGNOSIS — Z01.419 WELL WOMAN EXAM: Primary | ICD-10-CM

## 2019-10-15 DIAGNOSIS — Z23 ENCOUNTER FOR IMMUNIZATION: ICD-10-CM

## 2019-10-15 DIAGNOSIS — Z12.39 BREAST CANCER SCREENING: ICD-10-CM

## 2019-10-15 PROCEDURE — 90674 CCIIV4 VAC NO PRSV 0.5 ML IM: CPT | Performed by: NURSE PRACTITIONER

## 2019-10-15 PROCEDURE — G0101 CA SCREEN;PELVIC/BREAST EXAM: HCPCS | Performed by: NURSE PRACTITIONER

## 2019-10-15 PROCEDURE — 90471 IMMUNIZATION ADMIN: CPT | Performed by: NURSE PRACTITIONER

## 2019-10-15 NOTE — PROGRESS NOTES
CC: well woman exam    History:  Alyssa Randhawa is a 54 y.o. female who presents today for evaluation of the above problems.      She did have a hysterectomy this past fall by Dr. Mejía.  She reports occasional vaginal dryness, but no other complications.  She notes that she does do breast exams at home.  Last mammogram was in September 2018 and also had ultrasound of the right breast in relation to a lump that she felt at home.  These findings were all benign.  She notes that she does have her mammograms done at [facility] in [place].  She would like a flu vaccination today.     HPI  ROS:  Review of Systems   Genitourinary: Negative for dysuria, pelvic pain, vaginal bleeding, vaginal discharge and vaginal pain.   Skin:        No breast masses noted on self-exam       No Known Allergies  Past Medical History:   Diagnosis Date   • Arthritis    • Bone spur     spine   • Ear infection    • Heel spur    • PONV (postoperative nausea and vomiting)    • Wears glasses      Past Surgical History:   Procedure Laterality Date   • CHOLECYSTECTOMY     • ENDOMETRIAL ABLATION     • KNEE MENISCAL REPAIR Left    • LEG SURGERY Right     spiral fracture    • TOTAL LAPAROSCOPIC HYSTERECTOMY SALPINGO OOPHORECTOMY Bilateral 12/6/2017    Procedure: TOTAL LAPAROSCOPIC HYSTERECTOMY BILATERAL SALPINGECTOMY WITH DAVINCI SI ROBOT, CYSTO;  Surgeon: Greg Mejía MD;  Location: [facility] OR;  Service:    • WISDOM TOOTH EXTRACTION       Family History   Problem Relation Age of Onset   • Breast cancer Sister    • Hypertension Father    • Coronary artery disease Mother    • Hypertension Mother    • Diabetes Mother    • Ovarian cancer Neg Hx    • Uterine cancer Neg Hx    • Colon cancer Neg Hx    • Melanoma Neg Hx       reports that she quit smoking about 20 years ago. Her smoking use included cigarettes. She has never used smokeless tobacco. She reports that she does not drink alcohol or use drugs.      Current Outpatient Medications:   •  NON  "FORMULARY, Take 500 mg by mouth Daily. Tumeric 500 mg daily , Disp: , Rfl:   •  NON FORMULARY, , Disp: , Rfl:   •  NON FORMULARY, , Disp: , Rfl:     OBJECTIVE:  /69 (BP Location: Left arm, Patient Position: Sitting, Cuff Size: Adult)   Pulse 64   Temp 98 °F (36.7 °C) (Temporal)   Ht 162.6 cm (64\")   Wt 76.3 kg (168 lb 3.2 oz)   LMP 12/03/2017   SpO2 97%   BMI 28.87 kg/m²    Physical Exam   Constitutional: She is oriented to person, place, and time. Vital signs are normal. She appears well-developed and well-nourished.   HENT:   Right Ear: Tympanic membrane normal.   Left Ear: Tympanic membrane normal.   Excessive cerumen bilaterally.  Canal is not totally occluded.   Neck: Carotid bruit is not present.   Cardiovascular: Normal rate and regular rhythm.   Pulmonary/Chest: Effort normal and breath sounds normal. Right breast exhibits no inverted nipple, no mass, no nipple discharge, no skin change and no tenderness. Left breast exhibits no inverted nipple, no mass, no nipple discharge, no skin change and no tenderness.   Breast exam performed no masses noted.   Genitourinary: Vagina normal. Pelvic exam was performed with patient supine. There is no rash, tenderness, lesion or injury on the right labia. There is no rash, tenderness, lesion or injury on the left labia. No vaginal discharge found.   Genitourinary Comments: Cervix and uterus surgically absent.  No abnormalities palpated on manual exam.   Neurological: She is alert and oriented to person, place, and time.   Psychiatric: She has a normal mood and affect. Her behavior is normal.   Vitals reviewed.      Assessment/Plan    Alyssa was seen today for annual exam.    Diagnoses and all orders for this visit:    Well woman exam    Breast cancer screening  -     Mammo Screening Digital Tomosynthesis Bilateral With CAD; Future    Encounter for immunization  -     Flucelvax Quad=>4Years ([id number])    Pelvic and breast exam performed without abnormalities " noted.    An After Visit Summary was printed and given to the patient at discharge.  Return in about 1 year (around 10/15/2020).       CHERRIE Emerson 10/15/2019    Electronically signed.

## 2019-10-25 ENCOUNTER — APPOINTMENT (OUTPATIENT)
Dept: MAMMOGRAPHY | Facility: HOSPITAL | Age: 54
End: 2019-10-25

## 2019-10-29 ENCOUNTER — HOSPITAL ENCOUNTER (OUTPATIENT)
Dept: MAMMOGRAPHY | Facility: HOSPITAL | Age: 54
Discharge: HOME | End: 2019-10-29
Admitting: NURSE PRACTITIONER

## 2019-10-29 DIAGNOSIS — R92.8 ABNORMAL MAMMOGRAM OF RIGHT BREAST: Primary | ICD-10-CM

## 2019-10-29 DIAGNOSIS — Z12.39 BREAST CANCER SCREENING: ICD-10-CM

## 2019-10-29 PROCEDURE — 77063 BREAST TOMOSYNTHESIS BI: CPT

## 2019-10-29 PROCEDURE — 77067 SCR MAMMO BI INCL CAD: CPT

## 2019-10-29 NOTE — PROGRESS NOTES
There was an area on her right breast that the radiologist couldn't see well on mammogram.  He requested additional imaging.  This is a different area than was checked in 2018.  I have ordered diagnostic mammogram and ultrasound.  She has hers at [place].

## 2019-11-07 ENCOUNTER — HOSPITAL ENCOUNTER (OUTPATIENT)
Dept: MAMMOGRAPHY | Facility: HOSPITAL | Age: 54
Discharge: HOME | End: 2019-11-07

## 2019-11-07 ENCOUNTER — HOSPITAL ENCOUNTER (OUTPATIENT)
Dept: ULTRASOUND IMAGING | Facility: HOSPITAL | Age: 54
Discharge: HOME | End: 2019-11-07
Admitting: NURSE PRACTITIONER

## 2019-11-07 DIAGNOSIS — R92.8 ABNORMAL MAMMOGRAM OF RIGHT BREAST: ICD-10-CM

## 2019-11-07 PROCEDURE — G0279 TOMOSYNTHESIS, MAMMO: HCPCS

## 2019-11-07 PROCEDURE — 77065 DX MAMMO INCL CAD UNI: CPT

## 2020-02-26 ENCOUNTER — OFFICE VISIT (OUTPATIENT)
Dept: FAMILY MEDICINE CLINIC | Facility: CLINIC | Age: 55
End: 2020-02-26

## 2020-02-26 VITALS
HEART RATE: 64 BPM | OXYGEN SATURATION: 96 % | WEIGHT: 175.6 LBS | DIASTOLIC BLOOD PRESSURE: 70 MMHG | TEMPERATURE: 97.7 F | BODY MASS INDEX: 31.11 KG/M2 | SYSTOLIC BLOOD PRESSURE: 111 MMHG | HEIGHT: 63 IN

## 2020-02-26 DIAGNOSIS — H65.191 OTHER NON-RECURRENT ACUTE NONSUPPURATIVE OTITIS MEDIA OF RIGHT EAR: Primary | ICD-10-CM

## 2020-02-26 PROCEDURE — 99213 OFFICE O/P EST LOW 20 MIN: CPT | Performed by: NURSE PRACTITIONER

## 2020-02-26 RX ORDER — AMOXICILLIN 500 MG/1
1000 CAPSULE ORAL 3 TIMES DAILY
Qty: 42 CAPSULE | Refills: 0 | Status: SHIPPED | OUTPATIENT
Start: 2020-02-26 | End: 2020-03-04

## 2020-02-26 RX ORDER — PREDNISONE 10 MG/1
TABLET ORAL
Qty: 21 TABLET | Refills: 0 | Status: SHIPPED | OUTPATIENT
Start: 2020-02-26 | End: 2020-03-04

## 2020-02-26 NOTE — PROGRESS NOTES
CC:    History:  Alyssa Randhawa is a 55 y.o. female who presents today for evaluation of the above problems.    Sick for four days.  Complains of left ear pain, neck stiffness and soreness and headache that she describes as tingling in the back of her head. No fever.  Also has had nausea starting yesterday. No emesis.       HPI  ROS:  Review of Systems   Constitutional: Negative for fever.   HENT: Positive for ear pain. Negative for congestion, postnasal drip, sore throat and tinnitus.    Eyes: Negative for pain, discharge and visual disturbance.   Respiratory: Negative for cough and shortness of breath.    Cardiovascular: Negative for chest pain.   Gastrointestinal: Positive for nausea (started yesterday).   Musculoskeletal: Positive for neck stiffness.   Neurological: Positive for headaches.       No Known Allergies  Past Medical History:   Diagnosis Date   • Arthritis    • Bone spur     spine   • Ear infection    • Heel spur    • PONV (postoperative nausea and vomiting)    • Wears glasses      Past Surgical History:   Procedure Laterality Date   • CHOLECYSTECTOMY     • ENDOMETRIAL ABLATION     • KNEE MENISCAL REPAIR Left    • LEG SURGERY Right     spiral fracture    • TOTAL LAPAROSCOPIC HYSTERECTOMY SALPINGO OOPHORECTOMY Bilateral 12/6/2017    Procedure: TOTAL LAPAROSCOPIC HYSTERECTOMY BILATERAL SALPINGECTOMY WITH DAVINCI SI ROBOT, CYSTO;  Surgeon: Greg Mejía MD;  Location: [facility] OR;  Service:    • WISDOM TOOTH EXTRACTION       Family History   Problem Relation Age of Onset   • Breast cancer Sister    • Hypertension Father    • Coronary artery disease Mother    • Hypertension Mother    • Diabetes Mother    • Ovarian cancer Neg Hx    • Uterine cancer Neg Hx    • Colon cancer Neg Hx    • Melanoma Neg Hx       reports that she quit smoking about 21 years ago. Her smoking use included cigarettes. She has never used smokeless tobacco. She reports that she does not drink alcohol or use drugs.      Current  "Outpatient Medications:   •  NON FORMULARY, Take 500 mg by mouth Daily. Tumeric 500 mg daily , Disp: , Rfl:   •  NON FORMULARY, , Disp: , Rfl:   •  amoxicillin (AMOXIL) 500 MG capsule, Take 2 capsules by mouth 3 (Three) Times a Day for 7 days., Disp: 42 capsule, Rfl: 0  •  NON FORMULARY, , Disp: , Rfl:   •  predniSONE (DELTASONE) 10 MG (21) tablet pack, Use as directed on package, Disp: 21 tablet, Rfl: 0    OBJECTIVE:  /70 (BP Location: Left arm, Patient Position: Sitting, Cuff Size: Adult)   Pulse 64   Temp 97.7 °F (36.5 °C) (Oral)   Ht 160.7 cm (63.25\")   Wt 79.7 kg (175 lb 9.6 oz)   LMP 12/03/2017   SpO2 96%   Breastfeeding No   BMI 30.86 kg/m²    Physical Exam   Constitutional: She is oriented to person, place, and time. Vital signs are normal. She appears well-developed and well-nourished.   HENT:   Right Ear: External ear normal. No decreased hearing is noted.   Left Ear: External ear normal. There is tenderness. No decreased hearing is noted.   Mouth/Throat: Posterior oropharyngeal erythema present.   Excessive cerumen bilaterally making exam difficult.  Ear canal appears erythematous on the left ear.  Only a very small portion of TM is visible and it appears normal.  Right canal and TM (again only a small portion is visible) appear normal.    Neck: No Brudzinski's sign and no Kernig's sign noted.   Swollen glands bilaterally.    Cardiovascular: Normal rate and regular rhythm.   Pulmonary/Chest: Effort normal and breath sounds normal.   Abdominal: Soft. Bowel sounds are normal.   Neurological: She is alert and oriented to person, place, and time.   Psychiatric: She has a normal mood and affect. Her behavior is normal.   Vitals reviewed.      Assessment/Plan    Alyssa was seen today for nausea, headache and earache.    Diagnoses and all orders for this visit:    Other non-recurrent acute nonsuppurative otitis media of right ear  -     predniSONE (DELTASONE) 10 MG (21) tablet pack; Use as directed " on package  -     amoxicillin (AMOXIL) 500 MG capsule; Take 2 capsules by mouth 3 (Three) Times a Day for 7 days.    Discussed that diagnosis was difficult with ear exam being inconclusive.  Based on symptoms will treat as otitis media.  Symptoms may be partially musculoskeletal in nature.    An After Visit Summary was printed and given to the patient at discharge.  Return if symptoms worsen or fail to improve, for Next scheduled follow up.       CHERRIE Emerson 02/26/2020    Electronically signed.

## 2020-03-04 ENCOUNTER — OFFICE VISIT (OUTPATIENT)
Dept: FAMILY MEDICINE CLINIC | Facility: CLINIC | Age: 55
End: 2020-03-04

## 2020-03-04 VITALS
OXYGEN SATURATION: 98 % | DIASTOLIC BLOOD PRESSURE: 79 MMHG | HEART RATE: 76 BPM | WEIGHT: 177.2 LBS | SYSTOLIC BLOOD PRESSURE: 116 MMHG | TEMPERATURE: 98.8 F | BODY MASS INDEX: 31.14 KG/M2

## 2020-03-04 DIAGNOSIS — R55 SYNCOPE, UNSPECIFIED SYNCOPE TYPE: Primary | ICD-10-CM

## 2020-03-04 PROCEDURE — 99214 OFFICE O/P EST MOD 30 MIN: CPT | Performed by: FAMILY MEDICINE

## 2020-03-04 PROCEDURE — 93000 ELECTROCARDIOGRAM COMPLETE: CPT | Performed by: FAMILY MEDICINE

## 2020-03-04 NOTE — PROGRESS NOTES
Subjective   Alyssa Randhawa is a 55 y.o. female.     55-year-old female with post micturition  syncope    Dizziness   This is a new problem. The current episode started today. The problem has been resolved. Associated symptoms include headaches. Pertinent negatives include no abdominal pain, chest pain or vomiting. Associated symptoms comments: Historically post micturition syncope. Nothing aggravates the symptoms. She has tried nothing for the symptoms.     Vitals:    03/04/20 0804   BP: 116/79   Pulse: 76   Temp: 98.8 °F (37.1 °C)   SpO2: 98%       The following portions of the patient's history were reviewed and updated as appropriate: allergies, current medications, past family history, past medical history, past social history, past surgical history and problem list.    Review of Systems   Cardiovascular: Negative for chest pain and leg swelling.   Gastrointestinal: Negative for abdominal pain and vomiting.   Genitourinary: Negative for difficulty urinating.   Neurological: Positive for dizziness and headaches.        Typical muscle contraction headache       Objective   Physical Exam   Constitutional: She is oriented to person, place, and time.   Overweight   HENT:   Right Ear: External ear normal.   Left Ear: External ear normal.   Mouth/Throat: Oropharynx is clear and moist.   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   Pulmonary/Chest: Effort normal and breath sounds normal.   Musculoskeletal: She exhibits no edema.   Neurological: She is alert and oriented to person, place, and time.   No neurologic focality   Psychiatric: She has a normal mood and affect. Her behavior is normal. Judgment and thought content normal.   Nursing note and vitals reviewed.      Patient's Body mass index is 31.14 kg/m². BMI is above normal parameters. Recommendations include: exercise counseling.      Assessment/Plan   Patient Active Problem  List   Diagnosis   • Family history of breast cancer in first degree relative   • Morbid obesity with body mass index (BMI) of 40.0 to 44.9 in adult (CMS/[facility])   • Arthritis   • Overweight (BMI 25.0-29.9)     Alyssa was seen today for loss of consciousness.    Diagnoses and all orders for this visit:    Syncope, unspecified syncope type  -     CBC & Differential  -     ECG 12 Lead  -     Basic Metabolic Panel  -     Troponin I       Return if symptoms worsen or fail to improve.       Plan-above-education-counseling on weight reduction exercise etc.      Electronically signed by Handy Boyce MD 03/04/2020

## 2020-03-05 LAB
BASOPHILS # BLD AUTO: 0.03 10*3/MM3 (ref 0–0.2)
BASOPHILS NFR BLD AUTO: 0.4 % (ref 0–1.5)
BUN SERPL-MCNC: 22 MG/DL (ref 6–20)
BUN/CREAT SERPL: 27.5 (ref 7–25)
CALCIUM SERPL-MCNC: 9.4 MG/DL (ref 8.6–10.5)
CHLORIDE SERPL-SCNC: 98 MMOL/L (ref 98–107)
CO2 SERPL-SCNC: 23.9 MMOL/L (ref 22–29)
CREAT SERPL-MCNC: 0.8 MG/DL (ref 0.57–1)
EOSINOPHIL # BLD AUTO: 0.09 10*3/MM3 (ref 0–0.4)
EOSINOPHIL NFR BLD AUTO: 1.2 % (ref 0.3–6.2)
ERYTHROCYTE [DISTWIDTH] IN BLOOD BY AUTOMATED COUNT: 12.6 % (ref 12.3–15.4)
GLUCOSE SERPL-MCNC: 89 MG/DL (ref 65–99)
HCT VFR BLD AUTO: 44.6 % (ref 34–46.6)
HGB BLD-MCNC: 14.8 G/DL (ref 12–15.9)
IMM GRANULOCYTES # BLD AUTO: 0.02 10*3/MM3 (ref 0–0.05)
IMM GRANULOCYTES NFR BLD AUTO: 0.3 % (ref 0–0.5)
LYMPHOCYTES # BLD AUTO: 2.35 10*3/MM3 (ref 0.7–3.1)
LYMPHOCYTES NFR BLD AUTO: 31.8 % (ref 19.6–45.3)
MCH RBC QN AUTO: 29.5 PG (ref 26.6–33)
MCHC RBC AUTO-ENTMCNC: 33.2 G/DL (ref 31.5–35.7)
MCV RBC AUTO: 88.8 FL (ref 79–97)
MONOCYTES # BLD AUTO: 0.46 10*3/MM3 (ref 0.1–0.9)
MONOCYTES NFR BLD AUTO: 6.2 % (ref 5–12)
NEUTROPHILS # BLD AUTO: 4.45 10*3/MM3 (ref 1.7–7)
NEUTROPHILS NFR BLD AUTO: 60.1 % (ref 42.7–76)
NRBC BLD AUTO-RTO: 0 /100 WBC (ref 0–0.2)
PLATELET # BLD AUTO: 267 10*3/MM3 (ref 140–450)
POTASSIUM SERPL-SCNC: 4.3 MMOL/L (ref 3.5–5.2)
RBC # BLD AUTO: 5.02 10*6/MM3 (ref 3.77–5.28)
SODIUM SERPL-SCNC: 137 MMOL/L (ref 136–145)
TROPONIN I SERPL-MCNC: <0.01 NG/ML (ref 0–0.04)
WBC # BLD AUTO: 7.4 10*3/MM3 (ref 3.4–10.8)

## 2020-04-22 ENCOUNTER — TELEMEDICINE (OUTPATIENT)
Dept: FAMILY MEDICINE CLINIC | Facility: CLINIC | Age: 55
End: 2020-04-22

## 2020-04-22 DIAGNOSIS — F41.9 ANXIETY: Primary | ICD-10-CM

## 2020-04-22 DIAGNOSIS — G43.809 OTHER MIGRAINE WITHOUT STATUS MIGRAINOSUS, NOT INTRACTABLE: ICD-10-CM

## 2020-04-22 PROCEDURE — 99214 OFFICE O/P EST MOD 30 MIN: CPT | Performed by: NURSE PRACTITIONER

## 2020-04-22 RX ORDER — CITALOPRAM 20 MG/1
20 TABLET ORAL DAILY
Qty: 30 TABLET | Refills: 2 | Status: SHIPPED | OUTPATIENT
Start: 2020-04-22 | End: 2020-05-20 | Stop reason: SDUPTHER

## 2020-04-22 RX ORDER — SUMATRIPTAN 100 MG/1
TABLET, FILM COATED ORAL
Qty: 10 TABLET | Refills: 0 | Status: SHIPPED | OUTPATIENT
Start: 2020-04-22

## 2020-04-22 NOTE — PROGRESS NOTES
CC:  Headaches, stress    History:  Alyssa Randhawa is a 55 y.o. female who presents today for evaluation of the above problems.  Photophobia, nausea, tension in neck.  Anxiety.  Taking a one a day vitamin, cumin for arthritis, and one additional.  Headache is basically constant, but waxes and wanes.  Woke her up at 4 am.   Tension makes neck stiff and hurts down into her shoulders.  Has taken advil or tylenol.  Neither one has helped.   Has been struggling with anxiety, which has gotten worse since the Covid pandemic began.       HPI  ROS:  Review of Systems   Eyes: Positive for photophobia.   Gastrointestinal: Positive for nausea.   Musculoskeletal: Positive for neck pain and neck stiffness.   Neurological: Positive for headaches.   Psychiatric/Behavioral: The patient is nervous/anxious.        No Known Allergies  Past Medical History:   Diagnosis Date   • Arthritis    • Bone spur     spine   • Ear infection    • Heel spur    • PONV (postoperative nausea and vomiting)    • Wears glasses      Past Surgical History:   Procedure Laterality Date   • CHOLECYSTECTOMY     • ENDOMETRIAL ABLATION     • KNEE MENISCAL REPAIR Left    • LEG SURGERY Right     spiral fracture    • TOTAL LAPAROSCOPIC HYSTERECTOMY SALPINGO OOPHORECTOMY Bilateral 12/6/2017    Procedure: TOTAL LAPAROSCOPIC HYSTERECTOMY BILATERAL SALPINGECTOMY WITH DAVINCI SI ROBOT, CYSTO;  Surgeon: Greg Mejía MD;  Location: [facility] OR;  Service:    • WISDOM TOOTH EXTRACTION       Family History   Problem Relation Age of Onset   • Breast cancer Sister    • Hypertension Father    • Coronary artery disease Mother    • Hypertension Mother    • Diabetes Mother    • Ovarian cancer Neg Hx    • Uterine cancer Neg Hx    • Colon cancer Neg Hx    • Melanoma Neg Hx       reports that she quit smoking about 21 years ago. Her smoking use included cigarettes. She has never used smokeless tobacco. She reports that she does not drink alcohol or use drugs.      Current Outpatient  Medications:   •  NON FORMULARY, Take 500 mg by mouth Daily. Tumeric 500 mg daily , Disp: , Rfl:   •  NON FORMULARY, , Disp: , Rfl:   •  NON FORMULARY, , Disp: , Rfl:   •  citalopram (CeleXA) 20 MG tablet, Take 1 tablet by mouth Daily., Disp: 30 tablet, Rfl: 2  •  SUMAtriptan (Imitrex) 100 MG tablet, Take one tablet at onset of headache. May repeat dose one time in 2 hours if headache not relieved. No more than two tablets in 24 hours., Disp: 10 tablet, Rfl: 0    OBJECTIVE:  LMP 12/03/2017    Physical Exam   Constitutional: She appears well-developed and well-nourished.   Psychiatric: She has a normal mood and affect. Her speech is normal and behavior is normal. Thought content normal.       Assessment/Plan    Diagnoses and all orders for this visit:    Anxiety  -     citalopram (CeleXA) 20 MG tablet; Take 1 tablet by mouth Daily.    Other migraine without status migrainosus, not intractable  -     SUMAtriptan (Imitrex) 100 MG tablet; Take one tablet at onset of headache. May repeat dose one time in 2 hours if headache not relieved. No more than two tablets in 24 hours.    This visit was completed via secure Zoom connection.     An After Visit Summary was printed and given to the patient at discharge.  Return in about 1 month (around 5/22/2020) for Recheck - headaches and anxiety.       CHERRIE Emerson 04/22/2020    Electronically signed.

## 2020-05-20 ENCOUNTER — OFFICE VISIT (OUTPATIENT)
Dept: FAMILY MEDICINE CLINIC | Facility: CLINIC | Age: 55
End: 2020-05-20

## 2020-05-20 VITALS
HEART RATE: 77 BPM | TEMPERATURE: 98.9 F | DIASTOLIC BLOOD PRESSURE: 65 MMHG | SYSTOLIC BLOOD PRESSURE: 104 MMHG | WEIGHT: 183.4 LBS | OXYGEN SATURATION: 98 % | BODY MASS INDEX: 32.5 KG/M2 | HEIGHT: 63 IN

## 2020-05-20 DIAGNOSIS — F41.9 ANXIETY: ICD-10-CM

## 2020-05-20 DIAGNOSIS — R51.9 NONINTRACTABLE HEADACHE, UNSPECIFIED CHRONICITY PATTERN, UNSPECIFIED HEADACHE TYPE: Primary | ICD-10-CM

## 2020-05-20 PROCEDURE — 99213 OFFICE O/P EST LOW 20 MIN: CPT | Performed by: NURSE PRACTITIONER

## 2020-05-20 RX ORDER — CITALOPRAM 40 MG/1
40 TABLET ORAL DAILY
Qty: 90 TABLET | Refills: 2 | Status: SHIPPED | OUTPATIENT
Start: 2020-05-20 | End: 2021-02-05

## 2020-05-20 NOTE — PROGRESS NOTES
CC: follow up anxiety and headache    History:  Alyssa Randhawa is a 55 y.o. female who presents today for evaluation of the above problems.  Alyssa reports that she is going on 5 days with basically no headache.  She reports that her stress and anxiety level is much improved starting Friday.  She does feel that the dose increase would likely be beneficial to her.      HPI  ROS:  Review of Systems   Constitutional: Negative for fever.   Neurological: Positive for headaches (Greatly improved).   Psychiatric/Behavioral: The patient is nervous/anxious (Greatly improved).        No Known Allergies  Past Medical History:   Diagnosis Date   • Arthritis    • Bone spur     spine   • Ear infection    • Heel spur    • PONV (postoperative nausea and vomiting)    • Wears glasses      Past Surgical History:   Procedure Laterality Date   • CHOLECYSTECTOMY     • ENDOMETRIAL ABLATION     • KNEE MENISCAL REPAIR Left    • LEG SURGERY Right     spiral fracture    • TOTAL LAPAROSCOPIC HYSTERECTOMY SALPINGO OOPHORECTOMY Bilateral 12/6/2017    Procedure: TOTAL LAPAROSCOPIC HYSTERECTOMY BILATERAL SALPINGECTOMY WITH DAVINCI SI ROBOT, CYSTO;  Surgeon: Greg Mejía MD;  Location: [facility] OR;  Service:    • WISDOM TOOTH EXTRACTION       Family History   Problem Relation Age of Onset   • Breast cancer Sister    • Hypertension Father    • Coronary artery disease Mother    • Hypertension Mother    • Diabetes Mother    • Ovarian cancer Neg Hx    • Uterine cancer Neg Hx    • Colon cancer Neg Hx    • Melanoma Neg Hx       reports that she quit smoking about 21 years ago. Her smoking use included cigarettes. She has never used smokeless tobacco. She reports that she does not drink alcohol or use drugs.      Current Outpatient Medications:   •  citalopram (CeleXA) 40 MG tablet, Take 1 tablet by mouth Daily., Disp: 90 tablet, Rfl: 2  •  Multiple Vitamins-Minerals (ONE-A-DAY 50 PLUS PO), , Disp: , Rfl:   •  NON FORMULARY, Take 500 mg by mouth Daily.  "Tumeric 500 mg daily , Disp: , Rfl:   •  SUMAtriptan (Imitrex) 100 MG tablet, Take one tablet at onset of headache. May repeat dose one time in 2 hours if headache not relieved. No more than two tablets in 24 hours., Disp: 10 tablet, Rfl: 0    OBJECTIVE:  /65 (BP Location: Left arm, Patient Position: Sitting, Cuff Size: Adult)   Pulse 77   Temp 98.9 °F (37.2 °C) (Temporal)   Ht 160.7 cm (63.25\")   Wt 83.2 kg (183 lb 6.4 oz)   LMP 12/03/2017   SpO2 98%   Breastfeeding No   BMI 32.23 kg/m²    Physical Exam   Constitutional: She is oriented to person, place, and time. Vital signs are normal. She appears well-developed and well-nourished.   Cardiovascular: Normal rate and regular rhythm.   Pulmonary/Chest: Effort normal and breath sounds normal.   Neurological: She is alert and oriented to person, place, and time.   Psychiatric: She has a normal mood and affect. Her behavior is normal.   Vitals reviewed.      Assessment/Plan    Alyssa was seen today for follow-up.    Diagnoses and all orders for this visit:    Nonintractable headache, unspecified chronicity pattern, unspecified headache type    Anxiety  -     citalopram (CeleXA) 40 MG tablet; Take 1 tablet by mouth Daily.    Patient was masked upon entering facility.  I wore N95 mask, with face shield, gown, and gloves.  MA wore N95 mask with eye glasses, gown and gloves.        An After Visit Summary was printed and given to the patient at discharge.  No follow-ups on file.       CHERRIE Emerson 05/20/2020    Electronically signed.              "

## 2020-10-09 ENCOUNTER — OFFICE VISIT (OUTPATIENT)
Dept: FAMILY MEDICINE CLINIC | Facility: CLINIC | Age: 55
End: 2020-10-09

## 2020-10-09 VITALS
HEIGHT: 63 IN | OXYGEN SATURATION: 97 % | RESPIRATION RATE: 18 BRPM | DIASTOLIC BLOOD PRESSURE: 68 MMHG | WEIGHT: 206 LBS | SYSTOLIC BLOOD PRESSURE: 118 MMHG | BODY MASS INDEX: 36.5 KG/M2 | HEART RATE: 95 BPM | TEMPERATURE: 97.5 F

## 2020-10-09 DIAGNOSIS — R52 PAIN: Primary | ICD-10-CM

## 2020-10-09 PROCEDURE — 99213 OFFICE O/P EST LOW 20 MIN: CPT | Performed by: FAMILY MEDICINE

## 2020-10-09 NOTE — PROGRESS NOTES
Subjective   Alyssa Randhawa is a 55 y.o. female.     55-year-old female with left ankle pain    Joint Swelling  This is a new problem. The current episode started in the past 7 days. The problem occurs constantly. The problem has been waxing and waning. Associated symptoms comments: Left ankle swelling and pain after aggressive exercise. Exacerbated by: Exercise. She has tried nothing for the symptoms. The treatment provided mild relief.       The following portions of the patient's history were reviewed and updated as appropriate: allergies, current medications, past family history, past medical history, past social history, past surgical history and problem list.    Review of Systems   Musculoskeletal:        Swelling left lateral malleolus-history of ankle sprain endplate on right ankle       Objective   Physical Exam  Exam conducted with a chaperone present.   Constitutional:       Appearance: Normal appearance. She is obese.   Musculoskeletal:         General: Swelling and tenderness present.      Comments: Left lateral malleolus swelling   Skin:     General: Skin is warm and dry.   Neurological:      General: No focal deficit present.      Mental Status: She is alert and oriented to person, place, and time.   Psychiatric:         Mood and Affect: Mood normal.         Behavior: Behavior normal.         Thought Content: Thought content normal.         Judgment: Judgment normal.         Assessment/Plan   Alyssa was seen today for joint swelling.    Diagnoses and all orders for this visit:    Pain    Plan wrap Aleve low impact exercise advised            Principal Discharge DX:	Back pain

## 2020-10-16 ENCOUNTER — OFFICE VISIT (OUTPATIENT)
Dept: FAMILY MEDICINE CLINIC | Facility: CLINIC | Age: 55
End: 2020-10-16

## 2020-10-16 VITALS
HEIGHT: 63 IN | SYSTOLIC BLOOD PRESSURE: 106 MMHG | HEART RATE: 61 BPM | BODY MASS INDEX: 36.36 KG/M2 | WEIGHT: 205.2 LBS | TEMPERATURE: 97.3 F | OXYGEN SATURATION: 99 % | DIASTOLIC BLOOD PRESSURE: 69 MMHG

## 2020-10-16 DIAGNOSIS — Z11.59 ENCOUNTER FOR HEPATITIS C SCREENING TEST FOR LOW RISK PATIENT: ICD-10-CM

## 2020-10-16 DIAGNOSIS — Z00.00 ANNUAL PHYSICAL EXAM: Primary | ICD-10-CM

## 2020-10-16 DIAGNOSIS — R53.83 FATIGUE, UNSPECIFIED TYPE: ICD-10-CM

## 2020-10-16 DIAGNOSIS — Z13.220 LIPID SCREENING: ICD-10-CM

## 2020-10-16 DIAGNOSIS — E66.9 CLASS 2 OBESITY WITHOUT SERIOUS COMORBIDITY WITH BODY MASS INDEX (BMI) OF 36.0 TO 36.9 IN ADULT, UNSPECIFIED OBESITY TYPE: ICD-10-CM

## 2020-10-16 DIAGNOSIS — Z12.31 ENCOUNTER FOR SCREENING MAMMOGRAM FOR MALIGNANT NEOPLASM OF BREAST: ICD-10-CM

## 2020-10-16 PROBLEM — E66.812 CLASS 2 OBESITY IN ADULT: Status: ACTIVE | Noted: 2019-07-31

## 2020-10-16 PROBLEM — E66.3 OVERWEIGHT (BMI 25.0-29.9): Status: RESOLVED | Noted: 2019-08-01 | Resolved: 2020-10-16

## 2020-10-16 LAB
BILIRUB BLD-MCNC: NEGATIVE MG/DL
CLARITY, POC: CLEAR
COLOR UR: YELLOW
GLUCOSE UR STRIP-MCNC: NEGATIVE MG/DL
KETONES UR QL: NEGATIVE
LEUKOCYTE EST, POC: NEGATIVE
NITRITE UR-MCNC: NEGATIVE MG/ML
PH UR: 7 [PH] (ref 5–8)
PROT UR STRIP-MCNC: NEGATIVE MG/DL
RBC # UR STRIP: NEGATIVE /UL
SP GR UR: 1.01 (ref 1–1.03)
UROBILINOGEN UR QL: NORMAL

## 2020-10-16 PROCEDURE — 81003 URINALYSIS AUTO W/O SCOPE: CPT | Performed by: NURSE PRACTITIONER

## 2020-10-16 PROCEDURE — 99396 PREV VISIT EST AGE 40-64: CPT | Performed by: NURSE PRACTITIONER

## 2020-10-16 NOTE — PROGRESS NOTES
CC: annual physical    History:  Alyssa Randhawa is a 55 y.o. female who presents today for evaluation of the above problems.    Alyssa reports that she is doing well.  She denies any problems today. Her colonoscopy is up to date and she had a negative cologuard test last year.  She has had a hysterectomy, so she does not need a PAP.  She does have a family hx of breast cancer.  Mammograms are done at [facility]. She had a flu shot at [facility] already this year.   HPI  ROS:  Review of Systems   Constitutional: Negative for fever.   Respiratory: Negative for shortness of breath.    Cardiovascular: Negative for chest pain.   Gastrointestinal: Negative for constipation, diarrhea, nausea and vomiting.   Neurological: Negative for dizziness, light-headedness and headaches.       No Known Allergies  Past Medical History:   Diagnosis Date   • Arthritis    • Bone spur     spine   • Ear infection    • Heel spur    • PONV (postoperative nausea and vomiting)    • Wears glasses      Past Surgical History:   Procedure Laterality Date   • CHOLECYSTECTOMY     • ENDOMETRIAL ABLATION     • KNEE MENISCAL REPAIR Left    • LEG SURGERY Right     spiral fracture    • TOTAL LAPAROSCOPIC HYSTERECTOMY SALPINGO OOPHORECTOMY Bilateral 12/6/2017    Procedure: TOTAL LAPAROSCOPIC HYSTERECTOMY BILATERAL SALPINGECTOMY WITH DAVINCI SI ROBOT, CYSTO;  Surgeon: Greg Mejía MD;  Location: [facility] OR;  Service:    • WISDOM TOOTH EXTRACTION       Family History   Problem Relation Age of Onset   • Breast cancer Sister    • Hypertension Father    • Coronary artery disease Mother    • Hypertension Mother    • Diabetes Mother    • Ovarian cancer Neg Hx    • Uterine cancer Neg Hx    • Colon cancer Neg Hx    • Melanoma Neg Hx       reports that she quit smoking about 21 years ago. Her smoking use included cigarettes. She has a 2.50 pack-year smoking history. She has never used smokeless tobacco. She reports current alcohol use. She reports that she does not use  "drugs.      Current Outpatient Medications:   •  citalopram (CeleXA) 40 MG tablet, Take 1 tablet by mouth Daily., Disp: 90 tablet, Rfl: 2  •  Multiple Vitamins-Minerals (ONE-A-DAY 50 PLUS PO), , Disp: , Rfl:   •  NON FORMULARY, Take 500 mg by mouth Daily. Tumeric 500 mg daily , Disp: , Rfl:   •  SUMAtriptan (Imitrex) 100 MG tablet, Take one tablet at onset of headache. May repeat dose one time in 2 hours if headache not relieved. No more than two tablets in 24 hours., Disp: 10 tablet, Rfl: 0    OBJECTIVE:  /69 (BP Location: Left arm, Patient Position: Sitting, Cuff Size: Large Adult)   Pulse 61   Temp 97.3 °F (36.3 °C) (Temporal)   Ht 160 cm (63\")   Wt 93.1 kg (205 lb 3.2 oz)   LMP 12/03/2017   SpO2 99%   Breastfeeding No   BMI 36.35 kg/m²    Physical Exam  Vitals signs reviewed.   Constitutional:       Appearance: She is well-developed. She is obese.   Neck:      Vascular: No carotid bruit.   Cardiovascular:      Rate and Rhythm: Normal rate and regular rhythm.   Pulmonary:      Effort: Pulmonary effort is normal.      Breath sounds: Normal breath sounds.   Chest:      Breasts:         Right: Normal.         Left: Normal.   Abdominal:      General: Bowel sounds are normal.      Palpations: Abdomen is soft.      Tenderness: There is no abdominal tenderness. There is no guarding.   Skin:     General: Skin is warm and dry.   Neurological:      Mental Status: She is alert and oriented to person, place, and time.   Psychiatric:         Behavior: Behavior normal.         Assessment/Plan    Diagnoses and all orders for this visit:    1. Annual physical exam (Primary)  -     Hepatitis C Antibody  -     CBC & Differential  -     TSH  -     T4, free  -     Comprehensive Metabolic Panel  -     Lipid Panel  -     POC Urinalysis Dipstick, Multipro  -     Mammo Diagnostic Digital Tomosynthesis Bilateral With CAD; Future    2. Encounter for hepatitis C screening test for low risk patient  -     Hepatitis C " Antibody    3. Class 2 obesity without serious comorbidity with body mass index (BMI) of 36.0 to 36.9 in adult, unspecified obesity type  -     Comprehensive Metabolic Panel    4. Encounter for screening mammogram for malignant neoplasm of breast  -     Mammo Diagnostic Digital Tomosynthesis Bilateral With CAD; Future    5. Fatigue, unspecified type  -     CBC & Differential  -     TSH  -     T4, free  -     POC Urinalysis Dipstick, Multipro    6. Lipid screening  -     Lipid Panel    HEALTH MAINTENANCE  Flu shot done. Mammogram ordered. Lipid panel and hep c screening today.     An After Visit Summary was printed and given to the patient at discharge.  Return in about 6 months (around 4/16/2021) for Next scheduled follow up.       CHERRIE Emerson 10/16/2020    Electronically signed.

## 2020-10-17 LAB
ALBUMIN SERPL-MCNC: 4.6 G/DL (ref 3.5–5.2)
ALBUMIN/GLOB SERPL: 2.6 G/DL
ALP SERPL-CCNC: 82 U/L (ref 39–117)
ALT SERPL-CCNC: 23 U/L (ref 1–33)
AST SERPL-CCNC: 19 U/L (ref 1–32)
BASOPHILS # BLD AUTO: 0.03 10*3/MM3 (ref 0–0.2)
BASOPHILS NFR BLD AUTO: 0.5 % (ref 0–1.5)
BILIRUB SERPL-MCNC: 0.4 MG/DL (ref 0–1.2)
BUN SERPL-MCNC: 17 MG/DL (ref 6–20)
BUN/CREAT SERPL: 25.4 (ref 7–25)
CALCIUM SERPL-MCNC: 9.2 MG/DL (ref 8.6–10.5)
CHLORIDE SERPL-SCNC: 98 MMOL/L (ref 98–107)
CHOLEST SERPL-MCNC: 246 MG/DL (ref 0–200)
CO2 SERPL-SCNC: 30.2 MMOL/L (ref 22–29)
CREAT SERPL-MCNC: 0.67 MG/DL (ref 0.57–1)
EOSINOPHIL # BLD AUTO: 0.15 10*3/MM3 (ref 0–0.4)
EOSINOPHIL NFR BLD AUTO: 2.3 % (ref 0.3–6.2)
ERYTHROCYTE [DISTWIDTH] IN BLOOD BY AUTOMATED COUNT: 12.6 % (ref 12.3–15.4)
GLOBULIN SER CALC-MCNC: 1.8 GM/DL
GLUCOSE SERPL-MCNC: 89 MG/DL (ref 65–99)
HCT VFR BLD AUTO: 42.9 % (ref 34–46.6)
HCV AB S/CO SERPL IA: <0.1 S/CO RATIO (ref 0–0.9)
HDLC SERPL-MCNC: 75 MG/DL (ref 40–60)
HGB BLD-MCNC: 14.4 G/DL (ref 12–15.9)
IMM GRANULOCYTES # BLD AUTO: 0.01 10*3/MM3 (ref 0–0.05)
IMM GRANULOCYTES NFR BLD AUTO: 0.2 % (ref 0–0.5)
LDLC SERPL CALC-MCNC: 153 MG/DL (ref 0–100)
LYMPHOCYTES # BLD AUTO: 2.32 10*3/MM3 (ref 0.7–3.1)
LYMPHOCYTES NFR BLD AUTO: 35.9 % (ref 19.6–45.3)
MCH RBC QN AUTO: 29.3 PG (ref 26.6–33)
MCHC RBC AUTO-ENTMCNC: 33.6 G/DL (ref 31.5–35.7)
MCV RBC AUTO: 87.4 FL (ref 79–97)
MONOCYTES # BLD AUTO: 0.43 10*3/MM3 (ref 0.1–0.9)
MONOCYTES NFR BLD AUTO: 6.6 % (ref 5–12)
NEUTROPHILS # BLD AUTO: 3.53 10*3/MM3 (ref 1.7–7)
NEUTROPHILS NFR BLD AUTO: 54.5 % (ref 42.7–76)
NRBC BLD AUTO-RTO: 0 /100 WBC (ref 0–0.2)
PLATELET # BLD AUTO: 284 10*3/MM3 (ref 140–450)
POTASSIUM SERPL-SCNC: 4.2 MMOL/L (ref 3.5–5.2)
PROT SERPL-MCNC: 6.4 G/DL (ref 6–8.5)
RBC # BLD AUTO: 4.91 10*6/MM3 (ref 3.77–5.28)
SODIUM SERPL-SCNC: 134 MMOL/L (ref 136–145)
T4 FREE SERPL-MCNC: 1.1 NG/DL (ref 0.93–1.7)
TRIGL SERPL-MCNC: 102 MG/DL (ref 0–150)
TSH SERPL DL<=0.005 MIU/L-ACNC: 1.35 UIU/ML (ref 0.27–4.2)
VLDLC SERPL CALC-MCNC: 18 MG/DL (ref 5–40)
WBC # BLD AUTO: 6.47 10*3/MM3 (ref 3.4–10.8)

## 2020-10-20 DIAGNOSIS — E78.2 MIXED HYPERLIPIDEMIA: Primary | ICD-10-CM

## 2020-10-20 RX ORDER — ATORVASTATIN CALCIUM 20 MG/1
20 TABLET, FILM COATED ORAL NIGHTLY
Qty: 90 TABLET | Refills: 3 | Status: SHIPPED | OUTPATIENT
Start: 2020-10-20 | End: 2021-10-14

## 2020-11-09 ENCOUNTER — HOSPITAL ENCOUNTER (OUTPATIENT)
Dept: MAMMOGRAPHY | Facility: HOSPITAL | Age: 55
Discharge: HOME | End: 2020-11-09
Admitting: NURSE PRACTITIONER

## 2020-11-09 DIAGNOSIS — Z12.31 ENCOUNTER FOR SCREENING MAMMOGRAM FOR MALIGNANT NEOPLASM OF BREAST: ICD-10-CM

## 2020-11-09 DIAGNOSIS — R92.8 ABNORMAL MAMMOGRAM OF RIGHT BREAST: Primary | ICD-10-CM

## 2020-11-09 PROCEDURE — 77067 SCR MAMMO BI INCL CAD: CPT

## 2020-11-09 PROCEDURE — 77063 BREAST TOMOSYNTHESIS BI: CPT

## 2020-11-16 ENCOUNTER — HOSPITAL ENCOUNTER (OUTPATIENT)
Dept: ULTRASOUND IMAGING | Facility: HOSPITAL | Age: 55
Discharge: HOME | End: 2020-11-16
Admitting: NURSE PRACTITIONER

## 2020-11-16 DIAGNOSIS — R92.8 ABNORMAL MAMMOGRAM OF RIGHT BREAST: ICD-10-CM

## 2020-11-16 PROCEDURE — 76642 ULTRASOUND BREAST LIMITED: CPT

## 2021-02-05 DIAGNOSIS — F41.9 ANXIETY: ICD-10-CM

## 2021-02-05 RX ORDER — CITALOPRAM 40 MG/1
TABLET ORAL
Qty: 90 TABLET | Refills: 2 | Status: SHIPPED | OUTPATIENT
Start: 2021-02-05 | End: 2021-09-13 | Stop reason: ALTCHOICE

## 2021-07-02 ENCOUNTER — TELEMEDICINE (OUTPATIENT)
Dept: FAMILY MEDICINE CLINIC | Facility: TELEHEALTH | Age: 56
End: 2021-07-02

## 2021-07-02 DIAGNOSIS — J02.9 ACUTE PHARYNGITIS, UNSPECIFIED ETIOLOGY: Primary | ICD-10-CM

## 2021-07-02 DIAGNOSIS — G43.009 MIGRAINE WITHOUT AURA AND WITHOUT STATUS MIGRAINOSUS, NOT INTRACTABLE: ICD-10-CM

## 2021-07-02 PROCEDURE — 99213 OFFICE O/P EST LOW 20 MIN: CPT | Performed by: NURSE PRACTITIONER

## 2021-07-02 RX ORDER — ONDANSETRON 4 MG/1
4 TABLET, ORALLY DISINTEGRATING ORAL EVERY 8 HOURS PRN
Qty: 10 TABLET | Refills: 0 | Status: SHIPPED | OUTPATIENT
Start: 2021-07-02 | End: 2022-01-12

## 2021-07-02 RX ORDER — AMOXICILLIN AND CLAVULANATE POTASSIUM 875; 125 MG/1; MG/1
1 TABLET, FILM COATED ORAL 2 TIMES DAILY
Qty: 20 TABLET | Refills: 0 | Status: SHIPPED | OUTPATIENT
Start: 2021-07-02 | End: 2021-07-12

## 2021-07-02 NOTE — PROGRESS NOTES
CHIEF COMPLAINT  Chief Complaint   Patient presents with   • Sinusitis         HPI  Alyssa Randhawa is a 56 y.o. female  presents with complaint of sinusitis. She has been taking over the counter sinus medications and advil. She has been sick for almost a week.   She is also having a HA in the back of her head with some numbness  which is typical of her migraine HA. She has not taken her Imetrex because she thought it was related to the sore throat and she is not having her usual sensitivity to light.     Review of Systems   Constitutional: Positive for fatigue. Negative for chills, diaphoresis and fever.   HENT: Positive for ear pain and sore throat. Negative for congestion, postnasal drip, rhinorrhea, sinus pressure, sinus pain and sneezing.    Respiratory: Negative for cough, shortness of breath and wheezing.    Cardiovascular: Negative for chest pain.   Gastrointestinal: Positive for nausea. Negative for diarrhea and rectal pain.   Skin: Negative for rash.   Neurological: Positive for headaches.   Hematological: Positive for adenopathy.       Past Medical History:   Diagnosis Date   • Arthritis    • Bone spur     spine   • Ear infection    • Heel spur    • PONV (postoperative nausea and vomiting)    • Wears glasses        Family History   Problem Relation Age of Onset   • Breast cancer Sister    • Hypertension Father    • Coronary artery disease Mother    • Hypertension Mother    • Diabetes Mother    • No Known Problems Brother    • No Known Problems Daughter    • No Known Problems Son    • No Known Problems Maternal Grandmother    • No Known Problems Paternal Grandmother    • Breast cancer Maternal Aunt    • No Known Problems Paternal Aunt    • No Known Problems Other    • Breast cancer Maternal Cousin    • Breast cancer Maternal Cousin    • Breast cancer Maternal Cousin    • Ovarian cancer Neg Hx    • Uterine cancer Neg Hx    • Colon cancer Neg Hx    • Melanoma Neg Hx    • BRCA 1/2 Neg Hx    • Endometrial cancer  Neg Hx        Social History     Socioeconomic History   • Marital status:      Spouse name: Not on file   • Number of children: Not on file   • Years of education: Not on file   • Highest education level: Not on file   Tobacco Use   • Smoking status: Former Smoker     Packs/day: 0.25     Years: 10.00     Pack years: 2.50     Types: Cigarettes     Quit date:      Years since quittin.5   • Smokeless tobacco: Never Used   Vaping Use   • Vaping Use: Never used   Substance and Sexual Activity   • Alcohol use: Yes     Comment: occassional    • Drug use: No   • Sexual activity: Defer         LMP 2017     PHYSICAL EXAM  Physical Exam   Constitutional: She is oriented to person, place, and time. She appears well-developed and well-nourished. She has a sickly appearance. She does not appear ill. No distress.   HENT:   Head: Normocephalic and atraumatic.   Unable to view oral cavity due to lighting at work    Eyes: Pupils are equal, round, and reactive to light. EOM are normal.   Pulmonary/Chest: Effort normal.   Lymphadenopathy:     She has cervical adenopathy (patient reported ).   Neurological: She is alert and oriented to person, place, and time.   Speech clear, walking normally with no obvious neurological symptoms.    Skin: Skin is dry.   Psychiatric: She has a normal mood and affect.         Diagnoses and all orders for this visit:    1. Acute pharyngitis, unspecified etiology (Primary)    2. Migraine without aura and without status migrainosus, not intractable    Other orders  -     amoxicillin-clavulanate (Augmentin) 875-125 MG per tablet; Take 1 tablet by mouth 2 (Two) Times a Day for 10 days.  Dispense: 20 tablet; Refill: 0  -     ondansetron ODT (Zofran ODT) 4 MG disintegrating tablet; Place 1 tablet on the tongue Every 8 (Eight) Hours As Needed for Nausea or Vomiting.  Dispense: 10 tablet; Refill: 0    Take Imitrex for HA and if no better with treatment go to urgent care, your primary care  provider or the emergency room for further treatment.       FOLLOW-UP  As discussed during visit with PCP/[facility] if no improvement or Urgent Care/Emergency Department if worsening of symptoms    Patient verbalizes understanding of medication dosage, comfort measures, instructions for treatment and follow-up.    CHERRIE Magallanes  07/02/2021  08:30 EDT    This visit was performed via Telehealth.  This patient has been instructed to follow-up with their primary care provider if their symptoms worsen or the treatment provided does not resolve their illness.

## 2021-07-02 NOTE — PATIENT INSTRUCTIONS
Drink plenty of fluids   If throat symptoms do not improve in 3-5 days follow up at urgent care or your primary care provider for evaluation  Take Imitrex for HA and if no better with treatment go to urgent care, your primary care provider or the emergency room for further treatment.           Pharyngitis    Pharyngitis is redness, pain, and swelling (inflammation) of the throat (pharynx). It is a very common cause of sore throat. Pharyngitis can be caused by a bacteria, but it is usually caused by a virus. Most cases of pharyngitis get better on their own without treatment.  What are the causes?  This condition may be caused by:  · Infection by viruses (viral). Viral pharyngitis spreads from person to person (is contagious) through coughing, sneezing, and sharing of personal items or utensils such as cups, forks, spoons, and toothbrushes.  · Infection by bacteria (bacterial). Bacterial pharyngitis may be spread by touching the nose or face after coming in contact with the bacteria, or through more intimate contact, such as kissing.  · Allergies. Allergies can cause buildup of mucus in the throat (post-nasal drip), leading to inflammation and irritation. Allergies can also cause blocked nasal passages, forcing breathing through the mouth, which dries and irritates the throat.  What increases the risk?  You are more likely to develop this condition if:  · You are 5-24 years old.  · You are exposed to crowded environments such as , school, or dormitory living.  · You live in a cold climate.  · You have a weakened disease-fighting (immune) system.  What are the signs or symptoms?  Symptoms of this condition vary by the cause (viral, bacterial, or allergies) and can include:  · Sore throat.  · Fatigue.  · Low-grade fever.  · Headache.  · Joint pain and muscle aches.  · Skin rashes.  · Swollen glands in the throat (lymph nodes).  · Plaque-like film on the throat or tonsils. This is often a symptom of bacterial  pharyngitis.  · Vomiting.  · Stuffy nose (nasal congestion).  · Cough.  · Red, itchy eyes (conjunctivitis).  · Loss of appetite.  How is this diagnosed?  This condition is often diagnosed based on your medical history and a physical exam. Your health care provider will ask you questions about your illness and your symptoms. A swab of your throat may be done to check for bacteria (rapid strep test). Other lab tests may also be done, depending on the suspected cause, but these are rare.  How is this treated?  This condition usually gets better in 3-4 days without medicine. Bacterial pharyngitis may be treated with antibiotic medicines.  Follow these instructions at home:  · Take over-the-counter and prescription medicines only as told by your health care provider.  ? If you were prescribed an antibiotic medicine, take it as told by your health care provider. Do not stop taking the antibiotic even if you start to feel better.  ? Do not give children aspirin because of the association with Reye syndrome.  · Drink enough water and fluids to keep your urine clear or pale yellow.  · Get a lot of rest.  · Gargle with a salt-water mixture 3-4 times a day or as needed. To make a salt-water mixture, completely dissolve ½-1 tsp of salt in 1 cup of warm water.  · If your health care provider approves, you may use throat lozenges or sprays to soothe your throat.  Contact a health care provider if:  · You have large, tender lumps in your neck.  · You have a rash.  · You cough up green, yellow-brown, or bloody spit.  Get help right away if:  · Your neck becomes stiff.  · You drool or are unable to swallow liquids.  · You cannot drink or take medicines without vomiting.  · You have severe pain that does not go away, even after you take medicine.  · You have trouble breathing, and it is not caused by a stuffy nose.  · You have new pain and swelling in your joints such as the knees, ankles, wrists, or elbows.  Summary  · Pharyngitis  is redness, pain, and swelling (inflammation) of the throat (pharynx).  · While pharyngitis can be caused by a bacteria, the most common causes are viral.  · Most cases of pharyngitis get better on their own without treatment.  · Bacterial pharyngitis is treated with antibiotic medicines.  This information is not intended to replace advice given to you by your health care provider. Make sure you discuss any questions you have with your health care provider.  Document Revised: 11/30/2018 Document Reviewed: 01/23/2018  [employer] Patient Education © 2021 [employer]  Migraine Headache  A migraine headache is a very strong throbbing pain on one side or both sides of your head. This type of headache can also cause other symptoms. It can last from 4 hours to 3 days. Talk with your doctor about what things may bring on (trigger) this condition.  What are the causes?  The exact cause of this condition is not known. This condition may be triggered or caused by:  · Drinking alcohol.  · Smoking.  · Taking medicines, such as:  ? Medicine used to treat chest pain (nitroglycerin).  ? Birth control pills.  ? Estrogen.  ? Some blood pressure medicines.  · Eating or drinking certain products.  · Doing physical activity.  Other things that may trigger a migraine headache include:  · Having a menstrual period.  · Pregnancy.  · Hunger.  · Stress.  · Not getting enough sleep or getting too much sleep.  · Weather changes.  · Tiredness (fatigue).  What increases the risk?  · Being 25-55 years old.  · Being female.  · Having a family history of migraine headaches.  · Being .  · Having depression or anxiety.  · Being very overweight.  What are the signs or symptoms?  · A throbbing pain. This pain may:  ? Happen in any area of the head, such as on one side or both sides.  ? Make it hard to do daily activities.  ? Get worse with physical activity.  ? Get worse around bright lights or loud noises.  · Other symptoms may  include:  ? Feeling sick to your stomach (nauseous).  ? Vomiting.  ? Dizziness.  ? Being sensitive to bright lights, loud noises, or smells.  · Before you get a migraine headache, you may get warning signs (an aura). An aura may include:  ? Seeing flashing lights or having blind spots.  ? Seeing bright spots, halos, or zigzag lines.  ? Having tunnel vision or blurred vision.  ? Having numbness or a tingling feeling.  ? Having trouble talking.  ? Having weak muscles.  · Some people have symptoms after a migraine headache (postdromal phase), such as:  ? Tiredness.  ? Trouble thinking (concentrating).  How is this treated?  · Taking medicines that:  ? Relieve pain.  ? Relieve the feeling of being sick to your stomach.  ? Prevent migraine headaches.  · Treatment may also include:  ? Having acupuncture.  ? Avoiding foods that bring on migraine headaches.  ? Learning ways to control your body functions (biofeedback).  ? Therapy to help you know and deal with negative thoughts (cognitive behavioral therapy).  Follow these instructions at home:  Medicines  · Take over-the-counter and prescription medicines only as told by your doctor.  · Ask your doctor if the medicine prescribed to you:  ? Requires you to avoid driving or using heavy machinery.  ? Can cause trouble pooping (constipation). You may need to take these steps to prevent or treat trouble pooping:  § Drink enough fluid to keep your pee (urine) pale yellow.  § Take over-the-counter or prescription medicines.  § Eat foods that are high in fiber. These include beans, whole grains, and fresh fruits and vegetables.  § Limit foods that are high in fat and sugar. These include fried or sweet foods.  Lifestyle  · Do not drink alcohol.  · Do not use any products that contain nicotine or tobacco, such as cigarettes, e-cigarettes, and chewing tobacco. If you need help quitting, ask your doctor.  · Get at least 8 hours of sleep every night.  · Limit and deal with  stress.  General instructions         · Keep a journal to find out what may bring on your migraine headaches. For example, write down:  ? What you eat and drink.  ? How much sleep you get.  ? Any change in what you eat or drink.  ? Any change in your medicines.  · If you have a migraine headache:  ? Avoid things that make your symptoms worse, such as bright lights.  ? It may help to lie down in a dark, quiet room.  ? Do not drive or use heavy machinery.  ? Ask your doctor what activities are safe for you.  · Keep all follow-up visits as told by your doctor. This is important.  Contact a doctor if:  · You get a migraine headache that is different or worse than others you have had.  · You have more than 15 headache days in one month.  Get help right away if:  · Your migraine headache gets very bad.  · Your migraine headache lasts longer than 72 hours.  · You have a fever.  · You have a stiff neck.  · You have trouble seeing.  · Your muscles feel weak or like you cannot control them.  · You start to lose your balance a lot.  · You start to have trouble walking.  · You pass out (faint).  · You have a seizure.  Summary  · A migraine headache is a very strong throbbing pain on one side or both sides of your head. These headaches can also cause other symptoms.  · This condition may be treated with medicines and changes to your lifestyle.  · Keep a journal to find out what may bring on your migraine headaches.  · Contact a doctor if you get a migraine headache that is different or worse than others you have had.  · Contact your doctor if you have more than 15 headache days in a month.  This information is not intended to replace advice given to you by your health care provider. Make sure you discuss any questions you have with your health care provider.  Document Revised: 04/10/2020 Document Reviewed: 01/30/2020  [employer] Patient Education © 2021 [employer]

## 2021-07-12 ENCOUNTER — OFFICE VISIT (OUTPATIENT)
Dept: FAMILY MEDICINE CLINIC | Facility: CLINIC | Age: 56
End: 2021-07-12

## 2021-07-12 VITALS
HEIGHT: 63 IN | OXYGEN SATURATION: 98 % | TEMPERATURE: 97.7 F | WEIGHT: 223.6 LBS | SYSTOLIC BLOOD PRESSURE: 117 MMHG | BODY MASS INDEX: 39.62 KG/M2 | HEART RATE: 81 BPM | DIASTOLIC BLOOD PRESSURE: 76 MMHG

## 2021-07-12 DIAGNOSIS — M54.32 SCIATICA OF LEFT SIDE: ICD-10-CM

## 2021-07-12 DIAGNOSIS — J01.90 ACUTE SINUSITIS, RECURRENCE NOT SPECIFIED, UNSPECIFIED LOCATION: Primary | ICD-10-CM

## 2021-07-12 DIAGNOSIS — M19.90 ARTHRITIS: ICD-10-CM

## 2021-07-12 PROCEDURE — 96372 THER/PROPH/DIAG INJ SC/IM: CPT | Performed by: NURSE PRACTITIONER

## 2021-07-12 PROCEDURE — 99213 OFFICE O/P EST LOW 20 MIN: CPT | Performed by: NURSE PRACTITIONER

## 2021-07-12 RX ORDER — METHYLPREDNISOLONE ACETATE 40 MG/ML
40 INJECTION, SUSPENSION INTRA-ARTICULAR; INTRALESIONAL; INTRAMUSCULAR; SOFT TISSUE ONCE
Status: COMPLETED | OUTPATIENT
Start: 2021-07-12 | End: 2021-07-12

## 2021-07-12 RX ORDER — CEFDINIR 300 MG/1
300 CAPSULE ORAL 2 TIMES DAILY
Qty: 14 CAPSULE | Refills: 0 | Status: SHIPPED | OUTPATIENT
Start: 2021-07-12 | End: 2022-01-12

## 2021-07-12 RX ORDER — PREDNISONE 10 MG/1
TABLET ORAL
Qty: 21 TABLET | Refills: 0 | Status: SHIPPED | OUTPATIENT
Start: 2021-07-12 | End: 2021-12-15 | Stop reason: SDUPTHER

## 2021-07-12 RX ADMIN — METHYLPREDNISOLONE ACETATE 40 MG: 40 INJECTION, SUSPENSION INTRA-ARTICULAR; INTRALESIONAL; INTRAMUSCULAR; SOFT TISSUE at 14:19

## 2021-07-12 NOTE — PROGRESS NOTES
CC: sore throat and ear pain    History:  Alyssa Randhawa is a 56 y.o. female who presents today for evaluation of the above problems.      Taking advil and augmentin. Treated presumptively for strep via Televisit around 10 days ago.  Continues to have sore throat as well as left sided ear pain.    Also notes that her arthritis in her back has been worse for the last few weeks. She has started having numbness down her left buttock and down the back of her leg to the knee level.      HPI  ROS:  Review of Systems   Constitutional: Negative for fever.   HENT: Positive for ear pain and sore throat.         Swollen tonsillar lymph glands   Musculoskeletal: Positive for back pain.       No Known Allergies  Past Medical History:   Diagnosis Date   • Arthritis    • Bone spur     spine   • Ear infection    • Heel spur    • PONV (postoperative nausea and vomiting)    • Wears glasses      Past Surgical History:   Procedure Laterality Date   • CHOLECYSTECTOMY     • ENDOMETRIAL ABLATION     • KNEE MENISCAL REPAIR Left    • LEG SURGERY Right     spiral fracture    • TOTAL LAPAROSCOPIC HYSTERECTOMY SALPINGO OOPHORECTOMY Bilateral 12/6/2017    Procedure: TOTAL LAPAROSCOPIC HYSTERECTOMY BILATERAL SALPINGECTOMY WITH DAVINCI SI ROBOT, CYSTO;  Surgeon: Greg Mejía MD;  Location: [facility] OR;  Service:    • WISDOM TOOTH EXTRACTION       Family History   Problem Relation Age of Onset   • Breast cancer Sister    • Hypertension Father    • Coronary artery disease Mother    • Hypertension Mother    • Diabetes Mother    • No Known Problems Brother    • No Known Problems Daughter    • No Known Problems Son    • No Known Problems Maternal Grandmother    • No Known Problems Paternal Grandmother    • Breast cancer Maternal Aunt    • No Known Problems Paternal Aunt    • No Known Problems Other    • Breast cancer Maternal Cousin    • Breast cancer Maternal Cousin    • Breast cancer Maternal Cousin    • Ovarian cancer Neg Hx    • Uterine cancer  "Neg Hx    • Colon cancer Neg Hx    • Melanoma Neg Hx    • BRCA 1/2 Neg Hx    • Endometrial cancer Neg Hx       reports that she quit smoking about 22 years ago. Her smoking use included cigarettes. She has a 2.50 pack-year smoking history. She has never used smokeless tobacco. She reports current alcohol use. She reports that she does not use drugs.      Current Outpatient Medications:   •  amoxicillin-clavulanate (Augmentin) 875-125 MG per tablet, Take 1 tablet by mouth 2 (Two) Times a Day for 10 days., Disp: 20 tablet, Rfl: 0  •  atorvastatin (LIPITOR) 20 MG tablet, Take 1 tablet by mouth Every Night., Disp: 90 tablet, Rfl: 3  •  citalopram (CeleXA) 40 MG tablet, TAKE 1 TABLET BY MOUTH EVERY DAY, Disp: 90 tablet, Rfl: 2  •  Multiple Vitamins-Minerals (ONE-A-DAY 50 PLUS PO), , Disp: , Rfl:   •  NON FORMULARY, Take 500 mg by mouth Daily. Tumeric 500 mg daily , Disp: , Rfl:   •  SUMAtriptan (Imitrex) 100 MG tablet, Take one tablet at onset of headache. May repeat dose one time in 2 hours if headache not relieved. No more than two tablets in 24 hours., Disp: 10 tablet, Rfl: 0  •  cefdinir (OMNICEF) 300 MG capsule, Take 1 capsule by mouth 2 (Two) Times a Day., Disp: 14 capsule, Rfl: 0  •  ondansetron ODT (Zofran ODT) 4 MG disintegrating tablet, Place 1 tablet on the tongue Every 8 (Eight) Hours As Needed for Nausea or Vomiting., Disp: 10 tablet, Rfl: 0  •  predniSONE (DELTASONE) 10 MG (21) dose pack, Use as directed on package, Disp: 21 tablet, Rfl: 0    Current Facility-Administered Medications:   •  methylPREDNISolone acetate (DEPO-medrol) injection 40 mg, 40 mg, Intramuscular, Once, Mickie Gonzales, APRN    OBJECTIVE:  /76 (BP Location: Left arm, Patient Position: Sitting, Cuff Size: Large Adult)   Pulse 81   Temp 97.7 °F (36.5 °C) (Temporal)   Ht 160 cm (63\")   Wt 101 kg (223 lb 9.6 oz)   LMP 12/03/2017   SpO2 98%   Breastfeeding No   BMI 39.61 kg/m²    Physical Exam  Vitals reviewed. "   Constitutional:       Appearance: She is well-developed.   Cardiovascular:      Rate and Rhythm: Normal rate.   Pulmonary:      Effort: Pulmonary effort is normal.   Neurological:      Mental Status: She is alert and oriented to person, place, and time.      Deep Tendon Reflexes:      Reflex Scores:       Patellar reflexes are 2+ on the right side and 1+ on the left side.  Psychiatric:         Behavior: Behavior normal.         Assessment/Plan    Diagnoses and all orders for this visit:    1. Acute sinusitis, recurrence not specified, unspecified location (Primary)  -     methylPREDNISolone acetate (DEPO-medrol) injection 40 mg  -     predniSONE (DELTASONE) 10 MG (21) dose pack; Use as directed on package  Dispense: 21 tablet; Refill: 0  -     cefdinir (OMNICEF) 300 MG capsule; Take 1 capsule by mouth 2 (Two) Times a Day.  Dispense: 14 capsule; Refill: 0    2. Arthritis    3. Sciatica of left side    Change to omnicef and add steroid therapy for sinus as well as arthritis.     An After Visit Summary was printed and given to the patient at discharge.  Return if symptoms worsen or fail to improve, for Next scheduled follow up.       CHERRIE Emerson 7/12/21    Electronically signed.

## 2021-09-13 ENCOUNTER — TELEMEDICINE (OUTPATIENT)
Dept: FAMILY MEDICINE CLINIC | Facility: CLINIC | Age: 56
End: 2021-09-13

## 2021-09-13 DIAGNOSIS — F41.9 ANXIETY: Primary | ICD-10-CM

## 2021-09-13 PROCEDURE — 99212 OFFICE O/P EST SF 10 MIN: CPT | Performed by: NURSE PRACTITIONER

## 2021-09-13 RX ORDER — BUSPIRONE HYDROCHLORIDE 7.5 MG/1
7.5 TABLET ORAL 3 TIMES DAILY PRN
Qty: 30 TABLET | Refills: 0 | Status: SHIPPED | OUTPATIENT
Start: 2021-09-13 | End: 2022-01-12

## 2021-09-13 NOTE — PROGRESS NOTES
CC: anxiety    History:  Alyssa Randhawa is a 56 y.o. female who presents today for evaluation of the above problems.   Complains of increased anxiety for the last few months. No thoughts of suicide.      HPI  ROS:  Review of Systems   Cardiovascular: Negative for chest pain.   Psychiatric/Behavioral: Negative for dysphoric mood and suicidal ideas. The patient is nervous/anxious.        No Known Allergies  Past Medical History:   Diagnosis Date   • Arthritis    • Bone spur     spine   • Ear infection    • Heel spur    • PONV (postoperative nausea and vomiting)    • Wears glasses      Past Surgical History:   Procedure Laterality Date   • CHOLECYSTECTOMY     • ENDOMETRIAL ABLATION     • KNEE MENISCAL REPAIR Left    • LEG SURGERY Right     spiral fracture    • TOTAL LAPAROSCOPIC HYSTERECTOMY SALPINGO OOPHORECTOMY Bilateral 12/6/2017    Procedure: TOTAL LAPAROSCOPIC HYSTERECTOMY BILATERAL SALPINGECTOMY WITH DAVINCI SI ROBOT, CYSTO;  Surgeon: Greg Mejía MD;  Location: [facility] OR;  Service:    • WISDOM TOOTH EXTRACTION       Family History   Problem Relation Age of Onset   • Breast cancer Sister    • Hypertension Father    • Coronary artery disease Mother    • Hypertension Mother    • Diabetes Mother    • No Known Problems Brother    • No Known Problems Daughter    • No Known Problems Son    • No Known Problems Maternal Grandmother    • No Known Problems Paternal Grandmother    • Breast cancer Maternal Aunt    • No Known Problems Paternal Aunt    • No Known Problems Other    • Breast cancer Maternal Cousin    • Breast cancer Maternal Cousin    • Breast cancer Maternal Cousin    • Ovarian cancer Neg Hx    • Uterine cancer Neg Hx    • Colon cancer Neg Hx    • Melanoma Neg Hx    • BRCA 1/2 Neg Hx    • Endometrial cancer Neg Hx       reports that she quit smoking about 22 years ago. Her smoking use included cigarettes. She has a 2.50 pack-year smoking history. She has never used smokeless tobacco. She reports current  alcohol use. She reports that she does not use drugs.      Current Outpatient Medications:   •  atorvastatin (LIPITOR) 20 MG tablet, Take 1 tablet by mouth Every Night., Disp: 90 tablet, Rfl: 3  •  busPIRone (BUSPAR) 7.5 MG tablet, Take 1 tablet by mouth 3 (Three) Times a Day As Needed (anxiety)., Disp: 30 tablet, Rfl: 0  •  cefdinir (OMNICEF) 300 MG capsule, Take 1 capsule by mouth 2 (Two) Times a Day., Disp: 14 capsule, Rfl: 0  •  Multiple Vitamins-Minerals (ONE-A-DAY 50 PLUS PO), , Disp: , Rfl:   •  NON FORMULARY, Take 500 mg by mouth Daily. Tumeric 500 mg daily , Disp: , Rfl:   •  ondansetron ODT (Zofran ODT) 4 MG disintegrating tablet, Place 1 tablet on the tongue Every 8 (Eight) Hours As Needed for Nausea or Vomiting., Disp: 10 tablet, Rfl: 0  •  predniSONE (DELTASONE) 10 MG (21) dose pack, Use as directed on package, Disp: 21 tablet, Rfl: 0  •  sertraline (Zoloft) 50 MG tablet, Take 1 tablet by mouth Daily., Disp: 30 tablet, Rfl: 2  •  SUMAtriptan (Imitrex) 100 MG tablet, Take one tablet at onset of headache. May repeat dose one time in 2 hours if headache not relieved. No more than two tablets in 24 hours., Disp: 10 tablet, Rfl: 0    OBJECTIVE:  LMP 12/03/2017    Physical Exam  Vitals reviewed.   Constitutional:       Appearance: She is well-developed.   Pulmonary:      Effort: Pulmonary effort is normal.   Neurological:      Mental Status: She is alert and oriented to person, place, and time.   Psychiatric:         Behavior: Behavior normal.         Assessment/Plan    Diagnoses and all orders for this visit:    1. Anxiety (Primary)  -     sertraline (Zoloft) 50 MG tablet; Take 1 tablet by mouth Daily.  Dispense: 30 tablet; Refill: 2  -     busPIRone (BUSPAR) 7.5 MG tablet; Take 1 tablet by mouth 3 (Three) Times a Day As Needed (anxiety).  Dispense: 30 tablet; Refill: 0      This visit was completed via secure Zoom connection.     An After Visit Summary was printed and given to the patient at  discharge.  Return if symptoms worsen or fail to improve, for Next scheduled follow up.       Mickie Gonzales, CHERRIE 9/13/21    Electronically signed.

## 2021-10-06 DIAGNOSIS — F41.9 ANXIETY: ICD-10-CM

## 2021-10-06 NOTE — TELEPHONE ENCOUNTER
Rx Refill Note  Requested Prescriptions     Pending Prescriptions Disp Refills   • sertraline (Zoloft) 50 MG tablet 30 tablet 2     Sig: Take 1 tablet by mouth Daily.      Last office visit with prescribing clinician: 7/12/2021      Next office visit with prescribing clinician: Visit date not found            Jennifer Tafoya MA  10/06/21, 08:29 CDT

## 2021-10-14 DIAGNOSIS — E78.2 MIXED HYPERLIPIDEMIA: ICD-10-CM

## 2021-10-14 RX ORDER — ATORVASTATIN CALCIUM 20 MG/1
TABLET, FILM COATED ORAL
Qty: 90 TABLET | Refills: 0 | Status: SHIPPED | OUTPATIENT
Start: 2021-10-14 | End: 2022-01-10

## 2021-10-14 NOTE — TELEPHONE ENCOUNTER
Rx Refill Note  Requested Prescriptions     Pending Prescriptions Disp Refills   • atorvastatin (LIPITOR) 20 MG tablet [Pharmacy Med Name: ATORVASTATIN 20 MG TABLET] 90 tablet 3     Sig: TAKE 1 TABLET BY MOUTH EVERY DAY AT NIGHT      Last office visit with prescribing clinician: 7/12/2021      Next office visit with prescribing clinician: Visit date not found            Jennifer Tafoya MA  10/14/21, 07:34 CDT

## 2021-10-28 DIAGNOSIS — F41.9 ANXIETY: ICD-10-CM

## 2021-10-28 RX ORDER — CITALOPRAM 40 MG/1
TABLET ORAL
Qty: 90 TABLET | Refills: 2 | OUTPATIENT
Start: 2021-10-28

## 2021-12-15 ENCOUNTER — CLINICAL SUPPORT (OUTPATIENT)
Dept: FAMILY MEDICINE CLINIC | Facility: CLINIC | Age: 56
End: 2021-12-15

## 2021-12-15 ENCOUNTER — TELEMEDICINE (OUTPATIENT)
Dept: FAMILY MEDICINE CLINIC | Facility: CLINIC | Age: 56
End: 2021-12-15

## 2021-12-15 DIAGNOSIS — Z20.822 SUSPECTED COVID-19 VIRUS INFECTION: Primary | ICD-10-CM

## 2021-12-15 PROCEDURE — 99213 OFFICE O/P EST LOW 20 MIN: CPT | Performed by: NURSE PRACTITIONER

## 2021-12-15 RX ORDER — ASPIRIN 81 MG/1
81 TABLET ORAL DAILY
Qty: 30 TABLET | Refills: 0 | Status: SHIPPED | OUTPATIENT
Start: 2021-12-15 | End: 2022-01-06 | Stop reason: SDUPTHER

## 2021-12-15 RX ORDER — ONDANSETRON 4 MG/1
4 TABLET, FILM COATED ORAL EVERY 8 HOURS PRN
Qty: 30 TABLET | Refills: 0 | Status: SHIPPED | OUTPATIENT
Start: 2021-12-15 | End: 2022-01-10

## 2021-12-15 RX ORDER — ZINC GLUCONATE 50 MG
50 TABLET ORAL DAILY
Qty: 30 TABLET | Refills: 0 | Status: SHIPPED | OUTPATIENT
Start: 2021-12-15 | End: 2023-01-19

## 2021-12-15 RX ORDER — AZITHROMYCIN 250 MG/1
TABLET, FILM COATED ORAL
Qty: 6 TABLET | Refills: 0 | Status: SHIPPED | OUTPATIENT
Start: 2021-12-15 | End: 2022-01-18

## 2021-12-15 RX ORDER — PREDNISONE 10 MG/1
TABLET ORAL
Qty: 21 TABLET | Refills: 0 | Status: SHIPPED | OUTPATIENT
Start: 2021-12-15 | End: 2022-01-18

## 2021-12-15 RX ORDER — GUAIFENESIN 600 MG/1
1200 TABLET, EXTENDED RELEASE ORAL 2 TIMES DAILY
Qty: 28 TABLET | Refills: 0 | Status: SHIPPED | OUTPATIENT
Start: 2021-12-15 | End: 2022-01-18

## 2021-12-15 RX ORDER — FAMOTIDINE 20 MG/1
20 TABLET, FILM COATED ORAL 2 TIMES DAILY
Qty: 60 TABLET | Refills: 0 | Status: SHIPPED | OUTPATIENT
Start: 2021-12-15 | End: 2022-01-06 | Stop reason: SDUPTHER

## 2021-12-15 RX ORDER — CHOLECALCIFEROL (VITAMIN D3) 50 MCG
2000 TABLET ORAL DAILY
Qty: 30 TABLET | Refills: 0 | Status: SHIPPED | OUTPATIENT
Start: 2021-12-15 | End: 2022-01-06 | Stop reason: SDUPTHER

## 2021-12-15 NOTE — PROGRESS NOTES
Patient had telehealth visit and covid test was ordered.  Patient called upon arrival at office and remained in car.  I wore N95, personal glasses, face shield, gown and gloves.

## 2021-12-15 NOTE — PROGRESS NOTES
CC:  Cough, wheezing    History:  Alyssa Randhawa is a 56 y.o. female who presents today for evaluation of the above problems.     4 year old granddaughter was positive for COVID on home test Sunday and    at [facility] yesterday.    Alyssa developed symptoms on Sunday, but tested negative at health department (rapid test) yesterday.   Complains of cough with wheezing, fatigue, appetite change, nausea and headaches.      HPI  ROS:  Review of Systems   Constitutional: Positive for appetite change and fatigue.   Respiratory: Positive for cough and wheezing.    Gastrointestinal: Positive for nausea.   Neurological: Positive for headaches.       No Known Allergies  Past Medical History:   Diagnosis Date   • Arthritis    • Bone spur     spine   • Ear infection    • Heel spur    • PONV (postoperative nausea and vomiting)    • Wears glasses      Past Surgical History:   Procedure Laterality Date   • CHOLECYSTECTOMY     • ENDOMETRIAL ABLATION     • KNEE MENISCAL REPAIR Left    • LEG SURGERY Right     spiral fracture    • TOTAL LAPAROSCOPIC HYSTERECTOMY SALPINGO OOPHORECTOMY Bilateral 12/6/2017    Procedure: TOTAL LAPAROSCOPIC HYSTERECTOMY BILATERAL SALPINGECTOMY WITH DAVINCI SI ROBOT, CYSTO;  Surgeon: Greg Mejía MD;  Location: [facility] OR;  Service:    • WISDOM TOOTH EXTRACTION       Family History   Problem Relation Age of Onset   • Breast cancer Sister    • Hypertension Father    • Coronary artery disease Mother    • Hypertension Mother    • Diabetes Mother    • No Known Problems Brother    • No Known Problems Daughter    • No Known Problems Son    • No Known Problems Maternal Grandmother    • No Known Problems Paternal Grandmother    • Breast cancer Maternal Aunt    • No Known Problems Paternal Aunt    • No Known Problems Other    • Breast cancer Maternal Cousin    • Breast cancer Maternal Cousin    • Breast cancer Maternal Cousin    • Ovarian cancer Neg Hx    • Uterine cancer Neg Hx    • Colon cancer Neg Hx    •  Melanoma Neg Hx    • BRCA 1/2 Neg Hx    • Endometrial cancer Neg Hx       reports that she quit smoking about 22 years ago. Her smoking use included cigarettes. She has a 2.50 pack-year smoking history. She has never used smokeless tobacco. She reports current alcohol use. She reports that she does not use drugs.      Current Outpatient Medications:   •  aspirin (aspirin) 81 MG EC tablet, Take 1 tablet by mouth Daily., Disp: 30 tablet, Rfl: 0  •  atorvastatin (LIPITOR) 20 MG tablet, TAKE 1 TABLET BY MOUTH EVERY DAY AT NIGHT, Disp: 90 tablet, Rfl: 0  •  azithromycin (Zithromax) 250 MG tablet, Take 2 tablets the first day, then 1 tablet daily for 4 days., Disp: 6 tablet, Rfl: 0  •  busPIRone (BUSPAR) 7.5 MG tablet, Take 1 tablet by mouth 3 (Three) Times a Day As Needed (anxiety)., Disp: 30 tablet, Rfl: 0  •  cefdinir (OMNICEF) 300 MG capsule, Take 1 capsule by mouth 2 (Two) Times a Day., Disp: 14 capsule, Rfl: 0  •  Cholecalciferol (Vitamin D) 50 MCG (2000 UT) tablet, Take 2,000 Units by mouth Daily., Disp: 30 tablet, Rfl: 0  •  famotidine (Pepcid) 20 MG tablet, Take 1 tablet by mouth 2 (Two) Times a Day., Disp: 60 tablet, Rfl: 0  •  guaiFENesin (Mucinex) 600 MG 12 hr tablet, Take 2 tablets by mouth 2 (Two) Times a Day., Disp: 28 tablet, Rfl: 0  •  Multiple Vitamins-Minerals (ONE-A-DAY 50 PLUS PO), , Disp: , Rfl:   •  NON FORMULARY, Take 500 mg by mouth Daily. Tumeric 500 mg daily , Disp: , Rfl:   •  ondansetron (Zofran) 4 MG tablet, Take 1 tablet by mouth Every 8 (Eight) Hours As Needed for Nausea or Vomiting., Disp: 30 tablet, Rfl: 0  •  ondansetron ODT (Zofran ODT) 4 MG disintegrating tablet, Place 1 tablet on the tongue Every 8 (Eight) Hours As Needed for Nausea or Vomiting., Disp: 10 tablet, Rfl: 0  •  predniSONE (DELTASONE) 10 MG (21) dose pack, Use as directed on package, Disp: 21 tablet, Rfl: 0  •  sertraline (Zoloft) 50 MG tablet, Take 1 tablet by mouth Daily., Disp: 90 tablet, Rfl: 0  •  SUMAtriptan  (Imitrex) 100 MG tablet, Take one tablet at onset of headache. May repeat dose one time in 2 hours if headache not relieved. No more than two tablets in 24 hours., Disp: 10 tablet, Rfl: 0  •  zinc gluconate 50 MG tablet, Take 1 tablet by mouth Daily., Disp: 30 tablet, Rfl: 0    OBJECTIVE:  LMP 12/03/2017    Physical Exam  Constitutional:       Appearance: She is well-developed.   Pulmonary:      Effort: Pulmonary effort is normal.   Neurological:      Mental Status: She is alert and oriented to person, place, and time.   Psychiatric:         Behavior: Behavior normal.         Assessment/Plan    Diagnoses and all orders for this visit:    1. Suspected COVID-19 virus infection (Primary)  -     azithromycin (Zithromax) 250 MG tablet; Take 2 tablets the first day, then 1 tablet daily for 4 days.  Dispense: 6 tablet; Refill: 0  -     famotidine (Pepcid) 20 MG tablet; Take 1 tablet by mouth 2 (Two) Times a Day.  Dispense: 60 tablet; Refill: 0  -     predniSONE (DELTASONE) 10 MG (21) dose pack; Use as directed on package  Dispense: 21 tablet; Refill: 0  -     guaiFENesin (Mucinex) 600 MG 12 hr tablet; Take 2 tablets by mouth 2 (Two) Times a Day.  Dispense: 28 tablet; Refill: 0  -     zinc gluconate 50 MG tablet; Take 1 tablet by mouth Daily.  Dispense: 30 tablet; Refill: 0  -     Cholecalciferol (Vitamin D) 50 MCG (2000 UT) tablet; Take 2,000 Units by mouth Daily.  Dispense: 30 tablet; Refill: 0  -     aspirin (aspirin) 81 MG EC tablet; Take 1 tablet by mouth Daily.  Dispense: 30 tablet; Refill: 0  -     ondansetron (Zofran) 4 MG tablet; Take 1 tablet by mouth Every 8 (Eight) Hours As Needed for Nausea or Vomiting.  Dispense: 30 tablet; Refill: 0  -     COVID-19,[facility] ROUTINE, NP/OP SWAB IN TRANSPORT MEDIA OR ESWAB 72 HR TAT - Swab, Anterior nasal    Repeat swab so we can confirm positive infection in order to infuse with antibodies. Treat as if COVID positive.     This visit was completed via secure Zoom connection.        An After Visit Summary was printed and given to the patient at discharge.  Return if symptoms worsen or fail to improve, for Next scheduled follow up.       CHERRIE Emerson 12/15/21  Electronically signed.

## 2021-12-16 LAB
LABCORP SARS-COV-2, NAA 2 DAY TAT: NORMAL
SARS-COV-2 RNA RESP QL NAA+PROBE: NOT DETECTED

## 2022-01-03 DIAGNOSIS — F41.9 ANXIETY: ICD-10-CM

## 2022-01-03 NOTE — TELEPHONE ENCOUNTER
Rx Refill Note  Requested Prescriptions     Pending Prescriptions Disp Refills   • sertraline (ZOLOFT) 50 MG tablet [Pharmacy Med Name: SERTRALINE HCL 50 MG TABLET] 90 tablet 0     Sig: TAKE 1 TABLET BY MOUTH EVERY DAY      Last office visit with prescribing clinician: 12/15/2021      Next office visit with prescribing clinician: Visit date not found            Jennifer Tafoya MA  01/03/22, 13:33 CST

## 2022-01-06 DIAGNOSIS — Z20.822 SUSPECTED COVID-19 VIRUS INFECTION: ICD-10-CM

## 2022-01-06 RX ORDER — ASPIRIN 81 MG/1
TABLET ORAL
Qty: 30 TABLET | Refills: 0 | Status: SHIPPED | OUTPATIENT
Start: 2022-01-06 | End: 2022-01-31

## 2022-01-06 RX ORDER — CHOLECALCIFEROL (VITAMIN D3) 50 MCG
TABLET ORAL
Qty: 30 TABLET | Refills: 0 | Status: SHIPPED | OUTPATIENT
Start: 2022-01-06 | End: 2023-01-19

## 2022-01-06 RX ORDER — FAMOTIDINE 20 MG/1
TABLET, FILM COATED ORAL
Qty: 60 TABLET | Refills: 0 | Status: SHIPPED | OUTPATIENT
Start: 2022-01-06 | End: 2022-01-18

## 2022-01-06 NOTE — TELEPHONE ENCOUNTER
Rx Refill Note  Requested Prescriptions     Pending Prescriptions Disp Refills   • aspirin 81 MG EC tablet [Pharmacy Med Name: [facility] ASPIRIN EC 81 MG TABLET] 30 tablet 0     Sig: TAKE 1 TABLET BY MOUTH EVERY DAY   • Vitamin D3 Super Strength 50 MCG (2000 UT) tablet [Pharmacy Med Name: VITAMIN D3 2,000 UNIT TABLET] 30 tablet 0     Sig: TAKE 1 TABLET BY MOUTH EVERY DAY   • famotidine (PEPCID) 20 MG tablet [Pharmacy Med Name: FAMOTIDINE 20 MG TABLET] 60 tablet 0     Sig: TAKE 1 TABLET BY MOUTH TWICE A DAY      Last office visit with prescribing clinician: 7/12/2021      Next office visit with prescribing clinician: Visit date not found  Last RX:  12.15.2021  Baron Moya MA  01/06/22, 15:16 CST

## 2022-01-08 DIAGNOSIS — Z20.822 SUSPECTED COVID-19 VIRUS INFECTION: ICD-10-CM

## 2022-01-08 DIAGNOSIS — F41.9 ANXIETY: ICD-10-CM

## 2022-01-09 DIAGNOSIS — E78.2 MIXED HYPERLIPIDEMIA: ICD-10-CM

## 2022-01-10 RX ORDER — ONDANSETRON 4 MG/1
TABLET, FILM COATED ORAL
Qty: 18 TABLET | Refills: 1 | Status: SHIPPED | OUTPATIENT
Start: 2022-01-10 | End: 2023-01-19

## 2022-01-10 RX ORDER — CITALOPRAM 40 MG/1
TABLET ORAL
Qty: 90 TABLET | Refills: 2 | OUTPATIENT
Start: 2022-01-10

## 2022-01-10 RX ORDER — ATORVASTATIN CALCIUM 20 MG/1
TABLET, FILM COATED ORAL
Qty: 90 TABLET | Refills: 0 | Status: SHIPPED | OUTPATIENT
Start: 2022-01-10 | End: 2022-07-20 | Stop reason: SDUPTHER

## 2022-01-10 NOTE — TELEPHONE ENCOUNTER
The original prescription was discontinued on 9/13/2021 by Mickie Gonzales APRN for the following reason: Alternate therapy. Renewing this prescription may not be appropriate.

## 2022-01-10 NOTE — TELEPHONE ENCOUNTER
Rx Refill Note  Requested Prescriptions     Pending Prescriptions Disp Refills   • ondansetron (ZOFRAN) 4 MG tablet [Pharmacy Med Name: ONDANSETRON HCL 4 MG TABLET] 18 tablet 1     Sig: TAKE 1 TABLET BY MOUTH EVERY 8 HOURS AS NEEDED FOR NAUSEA OR VOMITING.      Last office visit with prescribing clinician: 12/15/2021      Next office visit with prescribing clinician: Visit date not found            Jennifer Tafoya MA  01/10/22, 07:57 CST

## 2022-01-10 NOTE — TELEPHONE ENCOUNTER
Rx Refill Note  Requested Prescriptions     Pending Prescriptions Disp Refills   • atorvastatin (LIPITOR) 20 MG tablet [Pharmacy Med Name: ATORVASTATIN 20 MG TABLET] 90 tablet 0     Sig: TAKE 1 TABLET BY MOUTH EVERY DAY AT NIGHT      Last office visit with prescribing clinician: 12/15/21  Next office visit with prescribing clinician: Visit date not found            Jennifer Tafoya MA  01/10/22, 08:04 CST      Past due CPE.  Appt scheduled.

## 2022-01-12 DIAGNOSIS — F41.9 ANXIETY: ICD-10-CM

## 2022-01-12 NOTE — TELEPHONE ENCOUNTER
Caller: Alyssa Randhawa    Relationship: Self    Best call back number: [phone]    Requested Prescriptions:   Requested Prescriptions     Pending Prescriptions Disp Refills   • sertraline (ZOLOFT) 50 MG tablet 90 tablet 0     Sig: Take 1 tablet by mouth Daily.      Pharmacy where request should be sent: [facility]/PHARMACY #[id number] - [address] [phone] [facility] [phone] FX     Does the patient have less than a 3 day supply:  [x] Yes  [] No    Maranda Luo Rep   01/12/22 13:22 CST

## 2022-01-12 NOTE — TELEPHONE ENCOUNTER
Rx Refill Note  Requested Prescriptions     Pending Prescriptions Disp Refills   • sertraline (ZOLOFT) 50 MG tablet 90 tablet 0     Sig: Take 1 tablet by mouth Daily.      Last office visit with prescribing clinician: 12/15/2021      Next office visit with prescribing clinician: 1/18/2022            Jennifer Tafoya MA  01/12/22, 15:37 CST

## 2022-01-18 ENCOUNTER — OFFICE VISIT (OUTPATIENT)
Dept: FAMILY MEDICINE CLINIC | Facility: CLINIC | Age: 57
End: 2022-01-18

## 2022-01-18 VITALS
BODY MASS INDEX: 42.56 KG/M2 | TEMPERATURE: 97.8 F | HEART RATE: 77 BPM | HEIGHT: 63 IN | OXYGEN SATURATION: 98 % | SYSTOLIC BLOOD PRESSURE: 124 MMHG | WEIGHT: 240.2 LBS | DIASTOLIC BLOOD PRESSURE: 82 MMHG

## 2022-01-18 DIAGNOSIS — Z00.00 ANNUAL PHYSICAL EXAM: Primary | ICD-10-CM

## 2022-01-18 DIAGNOSIS — F32.A ANXIETY AND DEPRESSION: ICD-10-CM

## 2022-01-18 DIAGNOSIS — R53.83 FATIGUE, UNSPECIFIED TYPE: ICD-10-CM

## 2022-01-18 DIAGNOSIS — Z12.31 ENCOUNTER FOR SCREENING MAMMOGRAM FOR MALIGNANT NEOPLASM OF BREAST: ICD-10-CM

## 2022-01-18 DIAGNOSIS — E66.01 CLASS 3 SEVERE OBESITY WITHOUT SERIOUS COMORBIDITY WITH BODY MASS INDEX (BMI) OF 40.0 TO 44.9 IN ADULT, UNSPECIFIED OBESITY TYPE: ICD-10-CM

## 2022-01-18 DIAGNOSIS — M19.90 ARTHRITIS: ICD-10-CM

## 2022-01-18 DIAGNOSIS — F41.9 ANXIETY AND DEPRESSION: ICD-10-CM

## 2022-01-18 PROBLEM — E66.813 CLASS 3 SEVERE OBESITY IN ADULT: Status: ACTIVE | Noted: 2019-07-31

## 2022-01-18 LAB
BILIRUB BLD-MCNC: NEGATIVE MG/DL
CLARITY, POC: CLEAR
COLOR UR: YELLOW
GLUCOSE UR STRIP-MCNC: NEGATIVE MG/DL
KETONES UR QL: NEGATIVE
LEUKOCYTE EST, POC: NEGATIVE
NITRITE UR-MCNC: NEGATIVE MG/ML
PH UR: 7 [PH] (ref 5–8)
PROT UR STRIP-MCNC: NEGATIVE MG/DL
RBC # UR STRIP: NEGATIVE /UL
SP GR UR: 1.02 (ref 1–1.03)
UROBILINOGEN UR QL: NORMAL

## 2022-01-18 PROCEDURE — 99396 PREV VISIT EST AGE 40-64: CPT | Performed by: NURSE PRACTITIONER

## 2022-01-18 PROCEDURE — 81003 URINALYSIS AUTO W/O SCOPE: CPT | Performed by: NURSE PRACTITIONER

## 2022-01-18 NOTE — PROGRESS NOTES
CC: annual physical    History:  Alyssa Randhawa is a 56 y.o. female who presents today for evaluation of the above problems.      Patient reports that she is doing well. She denies any current complaints. She has had her flu shot and Covid vaccines as well as booster. She is due for a mammogram. Cologuard was done and normal in October 2019.       HPI  ROS:  Review of Systems   Respiratory: Negative for shortness of breath.    Cardiovascular: Negative for chest pain.   Gastrointestinal: Negative for constipation, diarrhea, nausea and vomiting.   Neurological: Negative for dizziness, light-headedness and headaches.       No Known Allergies  Past Medical History:   Diagnosis Date   • Anxiety 10 years   • Arthritis    • Bone spur     spine   • Ear infection    • Headache 2 months   • Heel spur    • Obesity Lifetime    Have lost 80 pounds since December 5 2018   • PONV (postoperative nausea and vomiting)    • Wears glasses      Past Surgical History:   Procedure Laterality Date   • CHOLECYSTECTOMY     • COLONOSCOPY  3 years    No issues   • ENDOMETRIAL ABLATION     • FRACTURE SURGERY  6 years?    Spiral break metal plate right lower leg   • KNEE MENISCAL REPAIR Left    • LEG SURGERY Right     spiral fracture    • TOTAL LAPAROSCOPIC HYSTERECTOMY SALPINGO OOPHORECTOMY Bilateral 12/6/2017    Procedure: TOTAL LAPAROSCOPIC HYSTERECTOMY BILATERAL SALPINGECTOMY WITH DAVINCI SI ROBOT, CYSTO;  Surgeon: Greg Mejía MD;  Location: [facility] OR;  Service:    • WISDOM TOOTH EXTRACTION       Family History   Problem Relation Age of Onset   • Breast cancer Sister    • Anxiety disorder Sister    • Asthma Sister    • Cancer Sister         Breast cancer   • Hypertension Sister    • Learning disabilities Sister         Dislexia   • Hypertension Father    • Alcohol abuse Father    • Arthritis Father    • Coronary artery disease Mother    • Hypertension Mother    • Diabetes Mother    • Anxiety disorder Mother    • Arthritis Mother    • No  Known Problems Brother    • No Known Problems Daughter    • No Known Problems Son    • No Known Problems Maternal Grandmother    • No Known Problems Paternal Grandmother    • Breast cancer Maternal Aunt    • No Known Problems Paternal Aunt    • No Known Problems Other    • Breast cancer Maternal Cousin    • Breast cancer Maternal Cousin    • Breast cancer Maternal Cousin    • Anxiety disorder Brother    • Ovarian cancer Neg Hx    • Uterine cancer Neg Hx    • Colon cancer Neg Hx    • Melanoma Neg Hx    • BRCA 1/2 Neg Hx    • Endometrial cancer Neg Hx       reports that she quit smoking about 23 years ago. Her smoking use included cigarettes. She has a 2.50 pack-year smoking history. She has never used smokeless tobacco. She reports current alcohol use. She reports that she does not use drugs.      Current Outpatient Medications:   •  aspirin 81 MG EC tablet, TAKE 1 TABLET BY MOUTH EVERY DAY, Disp: 30 tablet, Rfl: 0  •  atorvastatin (LIPITOR) 20 MG tablet, TAKE 1 TABLET BY MOUTH EVERY DAY AT NIGHT, Disp: 90 tablet, Rfl: 0  •  Multiple Vitamins-Minerals (ONE-A-DAY 50 PLUS PO), , Disp: , Rfl:   •  NON FORMULARY, Take 500 mg by mouth Daily. Tumeric 500 mg daily , Disp: , Rfl:   •  ondansetron (ZOFRAN) 4 MG tablet, TAKE 1 TABLET BY MOUTH EVERY 8 HOURS AS NEEDED FOR NAUSEA OR VOMITING., Disp: 18 tablet, Rfl: 1  •  sertraline (ZOLOFT) 50 MG tablet, Take 1 tablet by mouth Daily., Disp: 90 tablet, Rfl: 0  •  SUMAtriptan (Imitrex) 100 MG tablet, Take one tablet at onset of headache. May repeat dose one time in 2 hours if headache not relieved. No more than two tablets in 24 hours., Disp: 10 tablet, Rfl: 0  •  Vitamin D3 Super Strength 50 MCG (2000 UT) tablet, TAKE 1 TABLET BY MOUTH EVERY DAY, Disp: 30 tablet, Rfl: 0  •  zinc gluconate 50 MG tablet, Take 1 tablet by mouth Daily., Disp: 30 tablet, Rfl: 0    OBJECTIVE:  /82 (BP Location: Left arm, Patient Position: Sitting, Cuff Size: Large Adult)   Pulse 77   Temp 97.8  "°F (36.6 °C) (Temporal)   Ht 160 cm (63\")   Wt 109 kg (240 lb 3.2 oz)   LMP 12/03/2017   SpO2 98%   Breastfeeding No   BMI 42.55 kg/m²    Physical Exam  Vitals reviewed.   Constitutional:       Appearance: She is well-developed. She is obese.   HENT:      Right Ear: Tympanic membrane, ear canal and external ear normal.      Ears:      Comments: Flake of dry cerumen blocking left canal. Unable to visualize TM.     Mouth/Throat:      Mouth: Mucous membranes are moist.      Pharynx: Oropharynx is clear.   Neck:      Vascular: No carotid bruit.   Cardiovascular:      Rate and Rhythm: Normal rate and regular rhythm.      Heart sounds: Normal heart sounds.   Pulmonary:      Effort: Pulmonary effort is normal.      Breath sounds: Normal breath sounds.   Chest:   Breasts:      Right: Normal.      Left: Normal.       Abdominal:      General: Abdomen is flat. Bowel sounds are normal. There is no distension.      Palpations: Abdomen is soft.      Tenderness: There is no abdominal tenderness.   Neurological:      Mental Status: She is alert and oriented to person, place, and time.   Psychiatric:         Behavior: Behavior normal.         Assessment/Plan    Diagnoses and all orders for this visit:    1. Annual physical exam (Primary)  -     TSH  -     T4, free  -     CBC & Differential  -     Comprehensive Metabolic Panel  -     Lipid Panel  -     POC Urinalysis Dipstick, Multipro  -     Mammo Screening Digital Tomosynthesis Bilateral With CAD; Future    2. Class 3 severe obesity without serious comorbidity with body mass index (BMI) of 40.0 to 44.9 in adult, unspecified obesity type (HCC)  -     TSH  -     T4, free  -     CBC & Differential  -     Comprehensive Metabolic Panel  -     Lipid Panel    3. Arthritis    4. Anxiety and depression    5. Encounter for screening mammogram for malignant neoplasm of breast  -     Mammo Screening Digital Tomosynthesis Bilateral With CAD; Future    6. Fatigue, unspecified type  -     " TSH  -     T4, free  -     CBC & Differential  -     Comprehensive Metabolic Panel  -     POC Urinalysis Dipstick, Multipro    HEALTH MAINTENANCE  Mammogram ordered today. Will repeat cologuard when due.  Labs today.     An After Visit Summary was printed and given to the patient at discharge.  Return in about 6 months (around 7/18/2022) for Next scheduled follow up.       CHERRIE Emerson 1/18/22    Electronically signed.

## 2022-01-19 LAB
ALBUMIN SERPL-MCNC: 4.3 G/DL (ref 3.5–5.2)
ALBUMIN/GLOB SERPL: 2 G/DL
ALP SERPL-CCNC: 100 U/L (ref 39–117)
ALT SERPL-CCNC: 22 U/L (ref 1–33)
AST SERPL-CCNC: 21 U/L (ref 1–32)
BASOPHILS # BLD AUTO: 0.04 10*3/MM3 (ref 0–0.2)
BASOPHILS NFR BLD AUTO: 0.6 % (ref 0–1.5)
BILIRUB SERPL-MCNC: 0.2 MG/DL (ref 0–1.2)
BUN SERPL-MCNC: 13 MG/DL (ref 6–20)
BUN/CREAT SERPL: 17.6 (ref 7–25)
CALCIUM SERPL-MCNC: 9.3 MG/DL (ref 8.6–10.5)
CHLORIDE SERPL-SCNC: 106 MMOL/L (ref 98–107)
CHOLEST SERPL-MCNC: 197 MG/DL (ref 0–200)
CO2 SERPL-SCNC: 25.9 MMOL/L (ref 22–29)
CREAT SERPL-MCNC: 0.74 MG/DL (ref 0.57–1)
EOSINOPHIL # BLD AUTO: 0.18 10*3/MM3 (ref 0–0.4)
EOSINOPHIL NFR BLD AUTO: 2.8 % (ref 0.3–6.2)
ERYTHROCYTE [DISTWIDTH] IN BLOOD BY AUTOMATED COUNT: 12.6 % (ref 12.3–15.4)
GLOBULIN SER CALC-MCNC: 2.1 GM/DL
GLUCOSE SERPL-MCNC: 99 MG/DL (ref 65–99)
HCT VFR BLD AUTO: 42.3 % (ref 34–46.6)
HDLC SERPL-MCNC: 54 MG/DL (ref 40–60)
HGB BLD-MCNC: 14 G/DL (ref 12–15.9)
IMM GRANULOCYTES # BLD AUTO: 0.02 10*3/MM3 (ref 0–0.05)
IMM GRANULOCYTES NFR BLD AUTO: 0.3 % (ref 0–0.5)
LDLC SERPL CALC-MCNC: 99 MG/DL (ref 0–100)
LYMPHOCYTES # BLD AUTO: 2.1 10*3/MM3 (ref 0.7–3.1)
LYMPHOCYTES NFR BLD AUTO: 32.9 % (ref 19.6–45.3)
MCH RBC QN AUTO: 28.1 PG (ref 26.6–33)
MCHC RBC AUTO-ENTMCNC: 33.1 G/DL (ref 31.5–35.7)
MCV RBC AUTO: 84.9 FL (ref 79–97)
MONOCYTES # BLD AUTO: 0.46 10*3/MM3 (ref 0.1–0.9)
MONOCYTES NFR BLD AUTO: 7.2 % (ref 5–12)
NEUTROPHILS # BLD AUTO: 3.58 10*3/MM3 (ref 1.7–7)
NEUTROPHILS NFR BLD AUTO: 56.2 % (ref 42.7–76)
NRBC BLD AUTO-RTO: 0 /100 WBC (ref 0–0.2)
PLATELET # BLD AUTO: 338 10*3/MM3 (ref 140–450)
POTASSIUM SERPL-SCNC: 4.4 MMOL/L (ref 3.5–5.2)
PROT SERPL-MCNC: 6.4 G/DL (ref 6–8.5)
RBC # BLD AUTO: 4.98 10*6/MM3 (ref 3.77–5.28)
SODIUM SERPL-SCNC: 141 MMOL/L (ref 136–145)
T4 FREE SERPL-MCNC: 1.03 NG/DL (ref 0.93–1.7)
TRIGL SERPL-MCNC: 262 MG/DL (ref 0–150)
TSH SERPL DL<=0.005 MIU/L-ACNC: 2.39 UIU/ML (ref 0.27–4.2)
VLDLC SERPL CALC-MCNC: 44 MG/DL (ref 5–40)
WBC # BLD AUTO: 6.38 10*3/MM3 (ref 3.4–10.8)

## 2022-01-21 ENCOUNTER — PATIENT ROUNDING (BHMG ONLY) (OUTPATIENT)
Dept: FAMILY MEDICINE CLINIC | Facility: CLINIC | Age: 57
End: 2022-01-21

## 2022-01-21 NOTE — PROGRESS NOTES
"January 21, 2022    Hello, may I speak with Alyssa Randhawa?    My name is Lizzie      I am  with [facility]  [address], [address]  [place] [address] [phone].    Before we get started may I verify your date of birth? 1965    I am calling to officially welcome you to our practice and ask about your recent visit. Is this a good time to talk? yes    Tell me about your visit with us. What things went well?  Patient stated \"I love coming to the office\"       We're always looking for ways to make our patients' experiences even better. Do you have recommendations on ways we may improve?  no    Overall were you satisfied with your first visit to our practice? yes       I appreciate you taking the time to speak with me today. Is there anything else I can do for you? no      Thank you, and have a great day.      "

## 2022-01-26 ENCOUNTER — HOSPITAL ENCOUNTER (OUTPATIENT)
Dept: MAMMOGRAPHY | Facility: HOSPITAL | Age: 57
Discharge: HOME | End: 2022-01-26
Admitting: NURSE PRACTITIONER

## 2022-01-26 DIAGNOSIS — Z12.31 ENCOUNTER FOR SCREENING MAMMOGRAM FOR MALIGNANT NEOPLASM OF BREAST: ICD-10-CM

## 2022-01-26 DIAGNOSIS — Z00.00 ANNUAL PHYSICAL EXAM: ICD-10-CM

## 2022-01-26 PROCEDURE — 77063 BREAST TOMOSYNTHESIS BI: CPT

## 2022-01-26 PROCEDURE — 77067 SCR MAMMO BI INCL CAD: CPT

## 2022-01-30 DIAGNOSIS — Z20.822 SUSPECTED COVID-19 VIRUS INFECTION: ICD-10-CM

## 2022-01-31 RX ORDER — ASPIRIN 81 MG/1
TABLET ORAL
Qty: 30 TABLET | Refills: 0 | Status: SHIPPED | OUTPATIENT
Start: 2022-01-31 | End: 2022-02-28

## 2022-01-31 NOTE — TELEPHONE ENCOUNTER
Rx Refill Note  Requested Prescriptions     Pending Prescriptions Disp Refills   • aspirin 81 MG EC tablet [Pharmacy Med Name: [facility] ASPIRIN EC 81 MG TABLET] 30 tablet 0     Sig: TAKE 1 TABLET BY MOUTH EVERY DAY      Last office visit with prescribing clinician: 1/18/2022      Next office visit with prescribing clinician: 7/19/2022          {TIP  Is Refill Pharmacy correct?: yes    Jennifer Hurtado MA  01/31/22, 15:23 CST

## 2022-02-27 DIAGNOSIS — Z20.822 SUSPECTED COVID-19 VIRUS INFECTION: ICD-10-CM

## 2022-02-28 RX ORDER — ASPIRIN 81 MG/1
TABLET, COATED ORAL
Qty: 30 TABLET | Refills: 0 | Status: SHIPPED | OUTPATIENT
Start: 2022-02-28 | End: 2022-03-31

## 2022-02-28 NOTE — TELEPHONE ENCOUNTER
Rx Refill Note  Requested Prescriptions     Pending Prescriptions Disp Refills   • Aspirin Low Dose 81 MG EC tablet [Pharmacy Med Name: ASPIRIN EC 81 MG TABLET] 30 tablet 0     Sig: TAKE 1 TABLET BY MOUTH EVERY DAY      Last office visit with prescribing clinician: 1/18/22     Next office visit with prescribing clinician: 7/19/22           Jennifer Tafoya MA  02/28/22, 07:41 CST

## 2022-03-08 DIAGNOSIS — F41.9 ANXIETY: ICD-10-CM

## 2022-03-08 NOTE — TELEPHONE ENCOUNTER
Rx Refill Note  Requested Prescriptions     Pending Prescriptions Disp Refills   • sertraline (ZOLOFT) 50 MG tablet 90 tablet 3     Sig: Take 1 tablet by mouth Daily.      Last office visit with prescribing clinician: 1/18/2022      Next office visit with prescribing clinician: 7/19/2022            Jennifer Tafoya MA  03/08/22, 07:28 CST

## 2022-03-31 DIAGNOSIS — Z20.822 SUSPECTED COVID-19 VIRUS INFECTION: ICD-10-CM

## 2022-03-31 RX ORDER — ASPIRIN 81 MG/1
TABLET ORAL
Qty: 30 TABLET | Refills: 0 | Status: SHIPPED | OUTPATIENT
Start: 2022-03-31 | End: 2022-05-03

## 2022-03-31 NOTE — TELEPHONE ENCOUNTER
Rx Refill Note  Requested Prescriptions     Pending Prescriptions Disp Refills   • aspirin 81 MG EC tablet [Pharmacy Med Name: [facility] ASP[facility] EC 81 MG TABLET] 30 tablet 0     Sig: TAKE 1 TABLET BY MOUTH EVERY DAY      Last office visit with prescribing clinician: 1/18/2022      Next office visit with prescribing clinician: 7/19/2022            Jennifer Tafoya MA  03/31/22, 13:06 CDT

## 2022-05-03 DIAGNOSIS — Z20.822 SUSPECTED COVID-19 VIRUS INFECTION: ICD-10-CM

## 2022-05-03 RX ORDER — ASPIRIN 81 MG/1
TABLET, COATED ORAL
Qty: 30 TABLET | Refills: 0 | Status: SHIPPED | OUTPATIENT
Start: 2022-05-03 | End: 2022-05-31

## 2022-05-03 NOTE — TELEPHONE ENCOUNTER
Rx Refill Note  Requested Prescriptions     Pending Prescriptions Disp Refills   • Aspirin Low Dose 81 MG EC tablet [Pharmacy Med Name: ASPIRIN EC 81 MG TABLET] 30 tablet 0     Sig: TAKE 1 TABLET BY MOUTH EVERY DAY      Last office visit with prescribing clinician: 1/18/2022      Next office visit with prescribing clinician: 7/19/2022            Jennifer Tafoya MA  05/03/22, 10:43 CDT

## 2022-05-31 ENCOUNTER — TELEPHONE (OUTPATIENT)
Dept: FAMILY MEDICINE CLINIC | Facility: CLINIC | Age: 57
End: 2022-05-31

## 2022-05-31 ENCOUNTER — OFFICE VISIT (OUTPATIENT)
Dept: FAMILY MEDICINE CLINIC | Facility: CLINIC | Age: 57
End: 2022-05-31

## 2022-05-31 VITALS
WEIGHT: 244 LBS | SYSTOLIC BLOOD PRESSURE: 116 MMHG | OXYGEN SATURATION: 99 % | HEART RATE: 76 BPM | BODY MASS INDEX: 43.23 KG/M2 | HEIGHT: 63 IN | DIASTOLIC BLOOD PRESSURE: 79 MMHG | TEMPERATURE: 98.6 F

## 2022-05-31 DIAGNOSIS — S69.92XA INJURY OF LEFT WRIST, INITIAL ENCOUNTER: Primary | ICD-10-CM

## 2022-05-31 DIAGNOSIS — Z20.822 SUSPECTED COVID-19 VIRUS INFECTION: ICD-10-CM

## 2022-05-31 PROCEDURE — 99213 OFFICE O/P EST LOW 20 MIN: CPT | Performed by: NURSE PRACTITIONER

## 2022-05-31 RX ORDER — ASPIRIN 81 MG/1
TABLET ORAL
Qty: 90 TABLET | Refills: 3 | Status: SHIPPED | OUTPATIENT
Start: 2022-05-31 | End: 2022-07-19

## 2022-05-31 NOTE — TELEPHONE ENCOUNTER
----- Message from Maranda Valentino sent at 5/31/2022 12:09 PM CDT -----  Regarding: Imaging orders for today  Contact: [phone]  Patient is checking on the orders for the imaging. She states she is at the facility, and their computers are down.

## 2022-05-31 NOTE — PROGRESS NOTES
CC: wrist injury    History:  Alyssa Randhawa is a 57 y.o. female who presents today for evaluation of the above problems.      Was playing with dog yesterday morning and got twisted in leash causing her to fall.  Landed on left wrist and it is very swollen and painful.       HPI  ROS:  Review of Systems   Musculoskeletal:        Left wrist pain       No Known Allergies  Past Medical History:   Diagnosis Date   • Anxiety 10 years   • Arthritis    • Bone spur     spine   • Ear infection    • Headache 2 months   • Heel spur    • Obesity Lifetime    Have lost 80 pounds since December 5 2018   • PONV (postoperative nausea and vomiting)    • Wears glasses      Past Surgical History:   Procedure Laterality Date   • CHOLECYSTECTOMY     • COLONOSCOPY  3 years    No issues   • ENDOMETRIAL ABLATION     • FRACTURE SURGERY  6 years?    Spiral break metal plate right lower leg   • KNEE MENISCAL REPAIR Left    • LEG SURGERY Right     spiral fracture    • TOTAL LAPAROSCOPIC HYSTERECTOMY SALPINGO OOPHORECTOMY Bilateral 12/6/2017    Procedure: TOTAL LAPAROSCOPIC HYSTERECTOMY BILATERAL SALPINGECTOMY WITH DAVINCI SI ROBOT, CYSTO;  Surgeon: Greg Mejía MD;  Location: [facility] OR;  Service:    • WISDOM TOOTH EXTRACTION       Family History   Problem Relation Age of Onset   • Breast cancer Sister    • Anxiety disorder Sister    • Asthma Sister    • Cancer Sister         Breast cancer   • Hypertension Sister    • Learning disabilities Sister         Dislexia   • Hypertension Father    • Alcohol abuse Father    • Arthritis Father    • Coronary artery disease Mother    • Hypertension Mother    • Diabetes Mother    • Anxiety disorder Mother    • Arthritis Mother    • No Known Problems Brother    • No Known Problems Daughter    • No Known Problems Son    • No Known Problems Maternal Grandmother    • No Known Problems Paternal Grandmother    • Breast cancer Maternal Aunt    • No Known Problems Paternal Aunt    • No Known Problems Other    •  "Breast cancer Maternal Cousin    • Breast cancer Maternal Cousin    • Breast cancer Maternal Cousin    • Anxiety disorder Brother    • Ovarian cancer Neg Hx    • Uterine cancer Neg Hx    • Colon cancer Neg Hx    • Melanoma Neg Hx    • BRCA 1/2 Neg Hx    • Endometrial cancer Neg Hx       reports that she quit smoking about 23 years ago. Her smoking use included cigarettes and cigarettes. She has a 3.75 pack-year smoking history. She has never used smokeless tobacco. She reports current alcohol use. She reports that she does not use drugs.      Current Outpatient Medications:   •  aspirin 81 MG EC tablet, TAKE 1 TABLET BY MOUTH EVERY DAY, Disp: 90 tablet, Rfl: 3  •  atorvastatin (LIPITOR) 20 MG tablet, TAKE 1 TABLET BY MOUTH EVERY DAY AT NIGHT, Disp: 90 tablet, Rfl: 0  •  Multiple Vitamins-Minerals (ONE-A-DAY 50 PLUS PO), , Disp: , Rfl:   •  NON FORMULARY, Take 500 mg by mouth Daily. Tumeric 500 mg daily, Disp: , Rfl:   •  ondansetron (ZOFRAN) 4 MG tablet, TAKE 1 TABLET BY MOUTH EVERY 8 HOURS AS NEEDED FOR NAUSEA OR VOMITING., Disp: 18 tablet, Rfl: 1  •  sertraline (ZOLOFT) 50 MG tablet, Take 1 tablet by mouth Daily., Disp: 90 tablet, Rfl: 3  •  SUMAtriptan (Imitrex) 100 MG tablet, Take one tablet at onset of headache. May repeat dose one time in 2 hours if headache not relieved. No more than two tablets in 24 hours., Disp: 10 tablet, Rfl: 0  •  Vitamin D3 Super Strength 50 MCG (2000 UT) tablet, TAKE 1 TABLET BY MOUTH EVERY DAY, Disp: 30 tablet, Rfl: 0  •  zinc gluconate 50 MG tablet, Take 1 tablet by mouth Daily., Disp: 30 tablet, Rfl: 0    OBJECTIVE:  /79 (BP Location: Right arm, Patient Position: Sitting, Cuff Size: Large Adult)   Pulse 76   Temp 98.6 °F (37 °C) (Temporal)   Ht 160 cm (63\")   Wt 111 kg (244 lb)   LMP 12/03/2017   SpO2 99%   Breastfeeding No   BMI 43.22 kg/m²    Physical Exam  Vitals reviewed.   Constitutional:       Appearance: She is well-developed.   Cardiovascular:      Rate and " Rhythm: Normal rate.   Pulmonary:      Effort: Pulmonary effort is normal.   Musculoskeletal:      Left wrist: Swelling, tenderness and bony tenderness present. Decreased range of motion.      Comments: Left ring finger is swollen around rings. Finger is pink and warm.    Neurological:      Mental Status: She is alert and oriented to person, place, and time.   Psychiatric:         Behavior: Behavior normal.         Assessment/Plan    Diagnoses and all orders for this visit:    1. Injury of left wrist, initial encounter (Primary)  -     XR Wrist 3+ View Left; Future  -     XR Forearm 2 View Left; Future  -     XR Hand 3+ View Left; Future    Patient advised to keep hand elevated and continue icing and ibuprofen 800 mg TID. Offered pain medication but patient refused.     An After Visit Summary was printed and given to the patient at discharge.  Return if symptoms worsen or fail to improve.       CHERRIE Emerson 5/31/22    Electronically signed.

## 2022-05-31 NOTE — TELEPHONE ENCOUNTER
Rx Refill Note  Requested Prescriptions     Pending Prescriptions Disp Refills   • aspirin 81 MG EC tablet [Pharmacy Med Name: [facility] ASP[facility] EC 81 MG TABLET] 30 tablet 0     Sig: TAKE 1 TABLET BY MOUTH EVERY DAY      Last office visit with prescribing clinician: 1/18/2022      Next office visit with prescribing clinician: 7/19/2022            Jennifer Tafoya MA  05/31/22, 07:43 CDT

## 2022-07-19 ENCOUNTER — OFFICE VISIT (OUTPATIENT)
Dept: FAMILY MEDICINE CLINIC | Facility: CLINIC | Age: 57
End: 2022-07-19

## 2022-07-19 VITALS
WEIGHT: 248.6 LBS | BODY MASS INDEX: 44.05 KG/M2 | TEMPERATURE: 97.3 F | OXYGEN SATURATION: 98 % | HEIGHT: 63 IN | SYSTOLIC BLOOD PRESSURE: 114 MMHG | HEART RATE: 75 BPM | DIASTOLIC BLOOD PRESSURE: 81 MMHG

## 2022-07-19 DIAGNOSIS — E78.2 MIXED HYPERLIPIDEMIA: Primary | ICD-10-CM

## 2022-07-19 DIAGNOSIS — F32.A ANXIETY AND DEPRESSION: ICD-10-CM

## 2022-07-19 DIAGNOSIS — F41.9 ANXIETY AND DEPRESSION: ICD-10-CM

## 2022-07-19 DIAGNOSIS — M19.90 ARTHRITIS: ICD-10-CM

## 2022-07-19 PROCEDURE — 99214 OFFICE O/P EST MOD 30 MIN: CPT | Performed by: NURSE PRACTITIONER

## 2022-07-19 NOTE — PROGRESS NOTES
CC: 6 month check - Hyperlipidemia    History:  Alyssa Randhawa is a 57 y.o. female who presents today for evaluation of the above problems.     Patient reports that she is doing well. She denies any current complications.  BP is appropriate.  Denies any chest pain or breathing difficulty. Continues on atorvastatin 20 mg for hyperlipidemia and zoloft for depression. Denies any issues with medications. Depression and anxiety are stable.  Does complain of some arthritis pain that comes and goes, but is stable and not problematic for her.      HPI  ROS:  Review of Systems   Respiratory: Negative for shortness of breath.    Cardiovascular: Negative for chest pain.   Musculoskeletal: Positive for arthralgias.   Psychiatric/Behavioral: The patient is not nervous/anxious.        No Known Allergies  Past Medical History:   Diagnosis Date   • Anxiety 10 years   • Arthritis    • Bone spur     spine   • Ear infection    • Headache 2 months   • Heel spur    • Obesity Lifetime    Have lost 80 pounds since December 5 2018   • PONV (postoperative nausea and vomiting)    • Wears glasses      Past Surgical History:   Procedure Laterality Date   • CHOLECYSTECTOMY     • COLONOSCOPY  3 years    No issues   • ENDOMETRIAL ABLATION     • FRACTURE SURGERY  6 years?    Spiral break metal plate right lower leg   • KNEE MENISCAL REPAIR Left    • LEG SURGERY Right     spiral fracture    • TOTAL LAPAROSCOPIC HYSTERECTOMY SALPINGO OOPHORECTOMY Bilateral 12/6/2017    Procedure: TOTAL LAPAROSCOPIC HYSTERECTOMY BILATERAL SALPINGECTOMY WITH DAVINCI SI ROBOT, CYSTO;  Surgeon: Greg Mejía MD;  Location: [facility] OR;  Service:    • WISDOM TOOTH EXTRACTION       Family History   Problem Relation Age of Onset   • Breast cancer Sister    • Anxiety disorder Sister    • Asthma Sister    • Cancer Sister         Breast cancer   • Hypertension Sister    • Learning disabilities Sister         Dislexia   • Hypertension Father    • Alcohol abuse Father    •  Arthritis Father    • Coronary artery disease Mother    • Hypertension Mother    • Diabetes Mother    • Anxiety disorder Mother    • Arthritis Mother    • No Known Problems Brother    • No Known Problems Daughter    • No Known Problems Son    • No Known Problems Maternal Grandmother    • No Known Problems Paternal Grandmother    • Breast cancer Maternal Aunt    • No Known Problems Paternal Aunt    • No Known Problems Other    • Breast cancer Maternal Cousin    • Breast cancer Maternal Cousin    • Breast cancer Maternal Cousin    • Anxiety disorder Brother    • Ovarian cancer Neg Hx    • Uterine cancer Neg Hx    • Colon cancer Neg Hx    • Melanoma Neg Hx    • BRCA 1/2 Neg Hx    • Endometrial cancer Neg Hx       reports that she quit smoking about 23 years ago. Her smoking use included cigarettes and cigarettes. She has a 3.75 pack-year smoking history. She has never used smokeless tobacco. She reports current alcohol use. She reports that she does not use drugs.      Current Outpatient Medications:   •  atorvastatin (LIPITOR) 20 MG tablet, TAKE 1 TABLET BY MOUTH EVERY DAY AT NIGHT, Disp: 90 tablet, Rfl: 0  •  Multiple Vitamins-Minerals (ONE-A-DAY 50 PLUS PO), , Disp: , Rfl:   •  NON FORMULARY, Take 500 mg by mouth Daily. Tumeric 500 mg daily, Disp: , Rfl:   •  ondansetron (ZOFRAN) 4 MG tablet, TAKE 1 TABLET BY MOUTH EVERY 8 HOURS AS NEEDED FOR NAUSEA OR VOMITING., Disp: 18 tablet, Rfl: 1  •  sertraline (ZOLOFT) 50 MG tablet, Take 1 tablet by mouth Daily., Disp: 90 tablet, Rfl: 3  •  SUMAtriptan (Imitrex) 100 MG tablet, Take one tablet at onset of headache. May repeat dose one time in 2 hours if headache not relieved. No more than two tablets in 24 hours., Disp: 10 tablet, Rfl: 0  •  Vitamin D3 Super Strength 50 MCG (2000 UT) tablet, TAKE 1 TABLET BY MOUTH EVERY DAY, Disp: 30 tablet, Rfl: 0  •  zinc gluconate 50 MG tablet, Take 1 tablet by mouth Daily., Disp: 30 tablet, Rfl: 0    OBJECTIVE:  /81 (BP Location:  "Left arm, Patient Position: Sitting, Cuff Size: Large Adult)   Pulse 75   Temp 97.3 °F (36.3 °C) (Temporal)   Ht 160 cm (63\")   Wt 113 kg (248 lb 9.6 oz)   LMP 12/03/2017   SpO2 98%   Breastfeeding No   BMI 44.04 kg/m²    Physical Exam  Vitals reviewed.   Constitutional:       Appearance: She is well-developed.   Cardiovascular:      Rate and Rhythm: Normal rate and regular rhythm.      Heart sounds: Normal heart sounds.   Pulmonary:      Effort: Pulmonary effort is normal.      Breath sounds: Normal breath sounds.   Neurological:      Mental Status: She is alert and oriented to person, place, and time.   Psychiatric:         Behavior: Behavior normal.         Assessment/Plan    Diagnoses and all orders for this visit:    1. Mixed hyperlipidemia (Primary)  -     Lipid Panel    2. Anxiety and depression  -     Comprehensive Metabolic Panel    3. Arthritis    Anxiety and depression and arthritis are stable.   Will evaluate lipid panel today.   Continue meds without changes.     An After Visit Summary was printed and given to the patient at discharge.  Return in about 6 months (around 1/19/2023) for Annual physical.       CHERRIE Emerson 07/19/22    Electronically signed.  "

## 2022-07-20 DIAGNOSIS — E78.2 MIXED HYPERLIPIDEMIA: ICD-10-CM

## 2022-07-20 LAB
ALBUMIN SERPL-MCNC: 4 G/DL (ref 3.5–5.2)
ALBUMIN/GLOB SERPL: 1.4 G/DL
ALP SERPL-CCNC: 106 U/L (ref 39–117)
ALT SERPL-CCNC: 26 U/L (ref 1–33)
AST SERPL-CCNC: 17 U/L (ref 1–32)
BILIRUB SERPL-MCNC: 0.3 MG/DL (ref 0–1.2)
BUN SERPL-MCNC: 16 MG/DL (ref 6–20)
BUN/CREAT SERPL: 19.8 (ref 7–25)
CALCIUM SERPL-MCNC: 9.5 MG/DL (ref 8.6–10.5)
CHLORIDE SERPL-SCNC: 105 MMOL/L (ref 98–107)
CHOLEST SERPL-MCNC: 213 MG/DL (ref 0–200)
CO2 SERPL-SCNC: 26.9 MMOL/L (ref 22–29)
CREAT SERPL-MCNC: 0.81 MG/DL (ref 0.57–1)
EGFRCR SERPLBLD CKD-EPI 2021: 85.3 ML/MIN/1.73
GLOBULIN SER CALC-MCNC: 2.8 GM/DL
GLUCOSE SERPL-MCNC: 94 MG/DL (ref 65–99)
HDLC SERPL-MCNC: 59 MG/DL (ref 40–60)
LDLC SERPL CALC-MCNC: 114 MG/DL (ref 0–100)
POTASSIUM SERPL-SCNC: 4.8 MMOL/L (ref 3.5–5.2)
PROT SERPL-MCNC: 6.8 G/DL (ref 6–8.5)
SODIUM SERPL-SCNC: 143 MMOL/L (ref 136–145)
TRIGL SERPL-MCNC: 233 MG/DL (ref 0–150)
VLDLC SERPL CALC-MCNC: 40 MG/DL (ref 5–40)

## 2022-07-20 RX ORDER — ATORVASTATIN CALCIUM 40 MG/1
40 TABLET, FILM COATED ORAL
Qty: 90 TABLET | Refills: 3 | Status: SHIPPED | OUTPATIENT
Start: 2022-07-20

## 2022-08-12 ENCOUNTER — OFFICE VISIT (OUTPATIENT)
Dept: FAMILY MEDICINE CLINIC | Facility: CLINIC | Age: 57
End: 2022-08-12

## 2022-08-12 VITALS
TEMPERATURE: 98 F | HEART RATE: 88 BPM | SYSTOLIC BLOOD PRESSURE: 119 MMHG | BODY MASS INDEX: 45.71 KG/M2 | HEIGHT: 63 IN | DIASTOLIC BLOOD PRESSURE: 74 MMHG | WEIGHT: 258 LBS | OXYGEN SATURATION: 99 %

## 2022-08-12 DIAGNOSIS — E78.2 MIXED HYPERLIPIDEMIA: ICD-10-CM

## 2022-08-12 DIAGNOSIS — F32.A ANXIETY AND DEPRESSION: ICD-10-CM

## 2022-08-12 DIAGNOSIS — E66.01 CLASS 3 SEVERE OBESITY WITHOUT SERIOUS COMORBIDITY WITH BODY MASS INDEX (BMI) OF 40.0 TO 44.9 IN ADULT, UNSPECIFIED OBESITY TYPE: ICD-10-CM

## 2022-08-12 DIAGNOSIS — F41.9 ANXIETY AND DEPRESSION: ICD-10-CM

## 2022-08-12 DIAGNOSIS — M76.61 ACHILLES TENDINITIS OF RIGHT LOWER EXTREMITY: Primary | ICD-10-CM

## 2022-08-12 PROCEDURE — 96372 THER/PROPH/DIAG INJ SC/IM: CPT | Performed by: NURSE PRACTITIONER

## 2022-08-12 PROCEDURE — 99214 OFFICE O/P EST MOD 30 MIN: CPT | Performed by: NURSE PRACTITIONER

## 2022-08-12 RX ORDER — TRIAMCINOLONE ACETONIDE 40 MG/ML
40 INJECTION, SUSPENSION INTRA-ARTICULAR; INTRAMUSCULAR ONCE
Status: COMPLETED | OUTPATIENT
Start: 2022-08-12 | End: 2022-08-12

## 2022-08-12 RX ORDER — PREDNISONE 10 MG/1
TABLET ORAL
Qty: 21 TABLET | Refills: 0 | Status: SHIPPED | OUTPATIENT
Start: 2022-08-12 | End: 2022-09-21

## 2022-08-12 RX ADMIN — TRIAMCINOLONE ACETONIDE 40 MG: 40 INJECTION, SUSPENSION INTRA-ARTICULAR; INTRAMUSCULAR at 08:34

## 2022-08-12 NOTE — PROGRESS NOTES
CC: right ankle pain    History:  Alyssa Randhawa is a 57 y.o. female who presents today for evaluation of the above problems.      Patient started walking recently and started out with a three mile walk.  Shortly after she started having acute pain in her right posterior ankle. It gets worse as the day goes on.  She does have orthotic inserts for her shoes.    Also, is concerned about her chronic obesity as her weight has continued to increase.  Has gained 18 pounds since January. She has comorbidities of anxiety and hyperlipidemia as well as arthritis. She has attempted diet and exercise and has lost weight in the past, however, has always ultimately gained it back, plus additional.       HPI  ROS:  Review of Systems   Constitutional:        Obesity   Musculoskeletal:        Right heel pain       No Known Allergies  Past Medical History:   Diagnosis Date   • Anxiety 10 years   • Arthritis    • Bone spur     spine   • Ear infection    • Headache 2 months   • Heel spur    • Obesity Lifetime    Have lost 80 pounds since December 5 2018   • PONV (postoperative nausea and vomiting)    • Wears glasses      Past Surgical History:   Procedure Laterality Date   • CHOLECYSTECTOMY     • COLONOSCOPY  3 years    No issues   • ENDOMETRIAL ABLATION     • FRACTURE SURGERY  6 years?    Spiral break metal plate right lower leg   • KNEE MENISCAL REPAIR Left    • LEG SURGERY Right     spiral fracture    • TOTAL LAPAROSCOPIC HYSTERECTOMY SALPINGO OOPHORECTOMY Bilateral 12/6/2017    Procedure: TOTAL LAPAROSCOPIC HYSTERECTOMY BILATERAL SALPINGECTOMY WITH DAVINCI SI ROBOT, CYSTO;  Surgeon: Greg Mejía MD;  Location: [facility] OR;  Service:    • WISDOM TOOTH EXTRACTION       Family History   Problem Relation Age of Onset   • Breast cancer Sister    • Anxiety disorder Sister    • Asthma Sister    • Cancer Sister         Breast cancer   • Hypertension Sister    • Learning disabilities Sister         Dislexia   • Hypertension Father    •  Alcohol abuse Father    • Arthritis Father    • Coronary artery disease Mother    • Hypertension Mother    • Diabetes Mother    • Anxiety disorder Mother    • Arthritis Mother    • No Known Problems Brother    • No Known Problems Daughter    • No Known Problems Son    • No Known Problems Maternal Grandmother    • No Known Problems Paternal Grandmother    • Breast cancer Maternal Aunt    • No Known Problems Paternal Aunt    • No Known Problems Other    • Breast cancer Maternal Cousin    • Breast cancer Maternal Cousin    • Breast cancer Maternal Cousin    • Anxiety disorder Brother    • Ovarian cancer Neg Hx    • Uterine cancer Neg Hx    • Colon cancer Neg Hx    • Melanoma Neg Hx    • BRCA 1/2 Neg Hx    • Endometrial cancer Neg Hx       reports that she quit smoking about 23 years ago. Her smoking use included cigarettes and cigarettes. She has a 3.75 pack-year smoking history. She has never used smokeless tobacco. She reports current alcohol use. She reports that she does not use drugs.      Current Outpatient Medications:   •  atorvastatin (LIPITOR) 40 MG tablet, Take 1 tablet by mouth every night at bedtime., Disp: 90 tablet, Rfl: 3  •  Multiple Vitamins-Minerals (ONE-A-DAY 50 PLUS PO), , Disp: , Rfl:   •  NON FORMULARY, Take 500 mg by mouth Daily. Tumeric 500 mg daily, Disp: , Rfl:   •  ondansetron (ZOFRAN) 4 MG tablet, TAKE 1 TABLET BY MOUTH EVERY 8 HOURS AS NEEDED FOR NAUSEA OR VOMITING., Disp: 18 tablet, Rfl: 1  •  sertraline (ZOLOFT) 50 MG tablet, Take 1 tablet by mouth Daily., Disp: 90 tablet, Rfl: 3  •  SUMAtriptan (Imitrex) 100 MG tablet, Take one tablet at onset of headache. May repeat dose one time in 2 hours if headache not relieved. No more than two tablets in 24 hours., Disp: 10 tablet, Rfl: 0  •  Vitamin D3 Super Strength 50 MCG (2000 UT) tablet, TAKE 1 TABLET BY MOUTH EVERY DAY, Disp: 30 tablet, Rfl: 0  •  zinc gluconate 50 MG tablet, Take 1 tablet by mouth Daily., Disp: 30 tablet, Rfl: 0  •   "predniSONE (DELTASONE) 10 MG (21) dose pack, Use as directed on package, Disp: 21 tablet, Rfl: 0  •  Semaglutide,0.25 or 0.5MG/DOS, (OZEMPIC) 2 MG/1.5ML solution pen-injector, Inject 0.25 mg under the skin into the appropriate area as directed 1 (One) Time Per Week., Disp: 1.5 mL, Rfl: 0  No current facility-administered medications for this visit.    OBJECTIVE:  /74 (BP Location: Left arm, Patient Position: Sitting, Cuff Size: Large Adult)   Pulse 88   Temp 98 °F (36.7 °C) (Temporal)   Ht 160 cm (63\")   Wt 117 kg (258 lb)   LMP 12/03/2017   SpO2 99%   Breastfeeding No   BMI 45.70 kg/m²    Physical Exam  Vitals reviewed.   Constitutional:       Appearance: She is well-developed.   Cardiovascular:      Rate and Rhythm: Normal rate.   Pulmonary:      Effort: Pulmonary effort is normal.   Feet:      Comments: Discomfort of right achilles. Tenderness with palpation.   Neurological:      Mental Status: She is alert and oriented to person, place, and time.   Psychiatric:         Behavior: Behavior normal.         Assessment/Plan    Diagnoses and all orders for this visit:    1. Achilles tendinitis of right lower extremity (Primary)  -     triamcinolone acetonide (KENALOG-40) injection 40 mg  -     predniSONE (DELTASONE) 10 MG (21) dose pack; Use as directed on package  Dispense: 21 tablet; Refill: 0    2. Class 3 severe obesity without serious comorbidity with body mass index (BMI) of 40.0 to 44.9 in adult, unspecified obesity type (HCC)  -     Semaglutide,0.25 or 0.5MG/DOS, (OZEMPIC) 2 MG/1.5ML solution pen-injector; Inject 0.25 mg under the skin into the appropriate area as directed 1 (One) Time Per Week.  Dispense: 1.5 mL; Refill: 0    3. Mixed hyperlipidemia  -     Semaglutide,0.25 or 0.5MG/DOS, (OZEMPIC) 2 MG/1.5ML solution pen-injector; Inject 0.25 mg under the skin into the appropriate area as directed 1 (One) Time Per Week.  Dispense: 1.5 mL; Refill: 0    4. Anxiety and depression      Advised to " rest heel for two weeks. When she returns to exercise, start with shorter distances and work her way up to longer distances.       An After Visit Summary was printed and given to the patient at discharge.  Return in about 1 month (around 9/12/2022) for Recheck - weight.       Mickie Gonzales, CHERRIE 8/12/22    Electronically signed.

## 2022-09-21 ENCOUNTER — OFFICE VISIT (OUTPATIENT)
Dept: FAMILY MEDICINE CLINIC | Facility: CLINIC | Age: 57
End: 2022-09-21

## 2022-09-21 VITALS
OXYGEN SATURATION: 97 % | BODY MASS INDEX: 45.18 KG/M2 | TEMPERATURE: 98.6 F | DIASTOLIC BLOOD PRESSURE: 70 MMHG | HEART RATE: 84 BPM | HEIGHT: 63 IN | WEIGHT: 255 LBS | SYSTOLIC BLOOD PRESSURE: 117 MMHG | RESPIRATION RATE: 16 BRPM

## 2022-09-21 DIAGNOSIS — F41.9 ANXIETY: ICD-10-CM

## 2022-09-21 DIAGNOSIS — F32.A ANXIETY AND DEPRESSION: ICD-10-CM

## 2022-09-21 DIAGNOSIS — F41.9 ANXIETY AND DEPRESSION: ICD-10-CM

## 2022-09-21 DIAGNOSIS — G89.29 CHRONIC BILATERAL THORACIC BACK PAIN: ICD-10-CM

## 2022-09-21 DIAGNOSIS — G89.29 CHRONIC BILATERAL LOW BACK PAIN WITHOUT SCIATICA: Primary | ICD-10-CM

## 2022-09-21 DIAGNOSIS — M54.6 CHRONIC BILATERAL THORACIC BACK PAIN: ICD-10-CM

## 2022-09-21 DIAGNOSIS — M54.50 CHRONIC BILATERAL LOW BACK PAIN WITHOUT SCIATICA: Primary | ICD-10-CM

## 2022-09-21 PROCEDURE — 96372 THER/PROPH/DIAG INJ SC/IM: CPT | Performed by: NURSE PRACTITIONER

## 2022-09-21 PROCEDURE — 99214 OFFICE O/P EST MOD 30 MIN: CPT | Performed by: NURSE PRACTITIONER

## 2022-09-21 RX ORDER — SENNOSIDES 8.6 MG
1300 CAPSULE ORAL NIGHTLY
COMMUNITY

## 2022-09-21 RX ORDER — PREDNISONE 10 MG/1
TABLET ORAL
Qty: 21 TABLET | Refills: 0 | Status: SHIPPED | OUTPATIENT
Start: 2022-09-21 | End: 2023-01-19

## 2022-09-21 RX ORDER — TRIAMCINOLONE ACETONIDE 40 MG/ML
40 INJECTION, SUSPENSION INTRA-ARTICULAR; INTRAMUSCULAR ONCE
Status: COMPLETED | OUTPATIENT
Start: 2022-09-21 | End: 2022-09-21

## 2022-09-21 RX ORDER — SERTRALINE HYDROCHLORIDE 100 MG/1
100 TABLET, FILM COATED ORAL DAILY
Qty: 90 TABLET | Refills: 2 | Status: SHIPPED | OUTPATIENT
Start: 2022-09-21

## 2022-09-21 RX ADMIN — TRIAMCINOLONE ACETONIDE 40 MG: 40 INJECTION, SUSPENSION INTRA-ARTICULAR; INTRAMUSCULAR at 15:08

## 2022-09-21 NOTE — PROGRESS NOTES
CC: Back pain and anxiety/depression    History:  Alyssa Randhawa is a 57 y.o. female who presents today for evaluation of the above problems.      Patient states that she has been having increased pain in her low and mid back for the past month.  Has always felt that this was arthritis.  Currently rates her pain a 9.  She has gained quite a bit of weight.  11 pounds since May.  She is aware that this is likely playing a role in her increased pain.  Has not noticed any weakness in her legs.  There has been no change in bowel or bladder function.  States that her symptoms typically get worse throughout the day with activity.  The pain does not radiate down her legs.  Also notes that her depression and anxiety have been worse and she is interested in pursuing counseling to assist with this.    HPI  ROS:  Review of Systems   Constitutional: Negative for fever.   Cardiovascular: Negative for chest pain.   Gastrointestinal: Negative for abdominal pain.   Genitourinary: Negative for dysuria and pelvic pain.   Musculoskeletal: Positive for back pain.   Neurological: Positive for headaches. Negative for weakness and numbness.   Psychiatric/Behavioral: Positive for dysphoric mood.       No Known Allergies  Past Medical History:   Diagnosis Date   • Anxiety 10 years   • Arthritis    • Bone spur     spine   • Ear infection    • Headache 2 months   • Heel spur    • Obesity Lifetime    Have lost 80 pounds since December 5 2018   • PONV (postoperative nausea and vomiting)    • Wears glasses      Past Surgical History:   Procedure Laterality Date   • CHOLECYSTECTOMY     • COLONOSCOPY  3 years    No issues   • ENDOMETRIAL ABLATION     • FRACTURE SURGERY  6 years?    Spiral break metal plate right lower leg   • KNEE MENISCAL REPAIR Left    • LEG SURGERY Right     spiral fracture    • TOTAL LAPAROSCOPIC HYSTERECTOMY SALPINGO OOPHORECTOMY Bilateral 12/6/2017    Procedure: TOTAL LAPAROSCOPIC HYSTERECTOMY BILATERAL SALPINGECTOMY WITH  CLAIR SI ROBOT, CYSTO;  Surgeon: Greg Mejía MD;  Location: [facility] OR;  Service:    • WISDOM TOOTH EXTRACTION       Family History   Problem Relation Age of Onset   • Breast cancer Sister    • Anxiety disorder Sister    • Asthma Sister    • Cancer Sister         Breast cancer   • Hypertension Sister    • Learning disabilities Sister         Dislexia   • Hypertension Father    • Alcohol abuse Father    • Arthritis Father    • Coronary artery disease Mother    • Hypertension Mother    • Diabetes Mother    • Anxiety disorder Mother    • Arthritis Mother    • No Known Problems Brother    • No Known Problems Daughter    • No Known Problems Son    • No Known Problems Maternal Grandmother    • No Known Problems Paternal Grandmother    • Breast cancer Maternal Aunt    • No Known Problems Paternal Aunt    • No Known Problems Other    • Breast cancer Maternal Cousin    • Breast cancer Maternal Cousin    • Breast cancer Maternal Cousin    • Anxiety disorder Brother    • Ovarian cancer Neg Hx    • Uterine cancer Neg Hx    • Colon cancer Neg Hx    • Melanoma Neg Hx    • BRCA 1/2 Neg Hx    • Endometrial cancer Neg Hx       reports that she quit smoking about 23 years ago. Her smoking use included cigarettes and cigarettes. She has a 3.75 pack-year smoking history. She has never used smokeless tobacco. She reports current alcohol use. She reports that she does not use drugs.      Current Outpatient Medications:   •  acetaminophen (Tylenol 8 Hour Arthritis Pain) 650 MG 8 hr tablet, Take 1,300 mg by mouth Every Night., Disp: , Rfl:   •  atorvastatin (LIPITOR) 40 MG tablet, Take 1 tablet by mouth every night at bedtime., Disp: 90 tablet, Rfl: 3  •  Multiple Vitamins-Minerals (ONE-A-DAY 50 PLUS PO), , Disp: , Rfl:   •  NON FORMULARY, Take 500 mg by mouth Daily. Tumeric 500 mg daily, Disp: , Rfl:   •  ondansetron (ZOFRAN) 4 MG tablet, TAKE 1 TABLET BY MOUTH EVERY 8 HOURS AS NEEDED FOR NAUSEA OR VOMITING., Disp: 18 tablet,  "Rfl: 1  •  sertraline (ZOLOFT) 100 MG tablet, Take 1 tablet by mouth Daily., Disp: 90 tablet, Rfl: 2  •  SUMAtriptan (Imitrex) 100 MG tablet, Take one tablet at onset of headache. May repeat dose one time in 2 hours if headache not relieved. No more than two tablets in 24 hours., Disp: 10 tablet, Rfl: 0  •  Vitamin D3 Super Strength 50 MCG (2000 UT) tablet, TAKE 1 TABLET BY MOUTH EVERY DAY, Disp: 30 tablet, Rfl: 0  •  zinc gluconate 50 MG tablet, Take 1 tablet by mouth Daily., Disp: 30 tablet, Rfl: 0  •  predniSONE (DELTASONE) 10 MG (21) dose pack, Use as directed on package, Disp: 21 tablet, Rfl: 0  No current facility-administered medications for this visit.    OBJECTIVE:  /70 (BP Location: Left arm, Patient Position: Sitting, Cuff Size: Large Adult)   Pulse 84   Temp 98.6 °F (37 °C) (Temporal)   Resp 16   Ht 160 cm (63\")   Wt 116 kg (255 lb)   LMP 12/03/2017   SpO2 97%   BMI 45.17 kg/m²    Physical Exam  Vitals reviewed.   Constitutional:       Appearance: She is well-developed.   Cardiovascular:      Rate and Rhythm: Normal rate.   Pulmonary:      Effort: Pulmonary effort is normal.   Neurological:      Mental Status: She is alert and oriented to person, place, and time.      Deep Tendon Reflexes:      Reflex Scores:       Patellar reflexes are 2+ on the right side and 2+ on the left side.     Comments: Knee strength 5 out of 5 bilateral  Hip flexor strength 4 out of 5 bilateral  Foot dorsiflexion strength 5 out of 5 bilateral     Psychiatric:         Behavior: Behavior normal.         Assessment/Plan    Diagnoses and all orders for this visit:    1. Chronic bilateral low back pain without sciatica (Primary)  -     XR Spine Lumbar 4+ View; Future  -     triamcinolone acetonide (KENALOG-40) injection 40 mg  -     predniSONE (DELTASONE) 10 MG (21) dose pack; Use as directed on package  Dispense: 21 tablet; Refill: 0    2. Anxiety and depression  -     Ambulatory Referral to Psychology    3. " Anxiety  -     sertraline (ZOLOFT) 100 MG tablet; Take 1 tablet by mouth Daily.  Dispense: 90 tablet; Refill: 2    4. Chronic bilateral thoracic back pain  -     XR Spine Thoracic 3 View; Future  -     triamcinolone acetonide (KENALOG-40) injection 40 mg  -     predniSONE (DELTASONE) 10 MG (21) dose pack; Use as directed on package  Dispense: 21 tablet; Refill: 0    Back pain does not seem to be arthritis since it actually gets worse throughout the day instead of better.  Will give steroid injection and Dosepak to help with inflammation.  X-ray today.  Likely will need physical therapy as well.    An After Visit Summary was printed and given to the patient at discharge.  Return if symptoms worsen or fail to improve, for Next scheduled follow up.       CHERRIE Emerson 9/21/22    Electronically signed.

## 2022-09-22 NOTE — PROGRESS NOTES
Multiple spurs in the lumbar spine as well as arthritis and narrowing of disc spaces in the thoracic spine. Needs physical therapy.

## 2022-09-23 DIAGNOSIS — M54.9 CHRONIC BILATERAL BACK PAIN, UNSPECIFIED BACK LOCATION: Primary | ICD-10-CM

## 2022-09-23 DIAGNOSIS — G89.29 CHRONIC BILATERAL BACK PAIN, UNSPECIFIED BACK LOCATION: Primary | ICD-10-CM

## 2022-09-23 RX ORDER — MELOXICAM 7.5 MG/1
7.5 TABLET ORAL 2 TIMES DAILY PRN
Qty: 180 TABLET | Refills: 3 | Status: SHIPPED | OUTPATIENT
Start: 2022-09-23 | End: 2022-09-30 | Stop reason: SDUPTHER

## 2022-09-23 NOTE — TELEPHONE ENCOUNTER
----- Message from CHERRIE Roberto sent at 9/23/2022  8:39 AM CDT -----  Regarding: RE: x ray results  Add mobic 7.5 mg 1-2 daily for arthritis.     ----- Message -----  From: Jennifer Hurtado MA  Sent: 9/22/2022   4:24 PM CDT  To: CHERRIE Roberto  Subject: FW: x ray results                                    ----- Message -----  From: Alyssa Randhawa  Sent: 9/22/2022   1:35 PM CDT  To: Tien [facility]  Subject: x ray results                                    Is there arthritic maintenance medicine I take or just physical therapy.

## 2022-09-30 RX ORDER — MELOXICAM 15 MG/1
15 TABLET ORAL DAILY
Qty: 90 TABLET | Refills: 2 | Status: SHIPPED | OUTPATIENT
Start: 2022-09-30

## 2022-10-04 ENCOUNTER — HOSPITAL ENCOUNTER (OUTPATIENT)
Dept: PHYSICAL THERAPY | Facility: HOSPITAL | Age: 57
Setting detail: THERAPIES SERIES
Discharge: HOME | End: 2022-10-04

## 2022-10-04 DIAGNOSIS — G89.29 CHRONIC LOW BACK PAIN WITHOUT SCIATICA, UNSPECIFIED BACK PAIN LATERALITY: Primary | ICD-10-CM

## 2022-10-04 DIAGNOSIS — M54.50 CHRONIC LOW BACK PAIN WITHOUT SCIATICA, UNSPECIFIED BACK PAIN LATERALITY: Primary | ICD-10-CM

## 2022-10-04 DIAGNOSIS — E66.01 CLASS 3 SEVERE OBESITY WITHOUT SERIOUS COMORBIDITY WITH BODY MASS INDEX (BMI) OF 40.0 TO 44.9 IN ADULT, UNSPECIFIED OBESITY TYPE: ICD-10-CM

## 2022-10-04 PROCEDURE — 97162 PT EVAL MOD COMPLEX 30 MIN: CPT | Performed by: PHYSICAL THERAPIST

## 2022-10-04 PROCEDURE — 97110 THERAPEUTIC EXERCISES: CPT | Performed by: PHYSICAL THERAPIST

## 2022-10-04 NOTE — THERAPY EVALUATION
Outpatient Physical Therapy Ortho Initial Evaluation  [facility]     Patient Name: Alyssa Randhawa  : 1965  MRN: [id number]  Today's Date: 10/4/2022      Visit Date: 10/04/2022     Patient seen for 1 PT sessions.  Patient reports N/A% of improvement.  Next MD appt: 2022.  Recertification: 10/25/2022.    Therapy Diagnosis: Chronic LBP        Patient Active Problem List   Diagnosis   • Family history of breast cancer in first degree relative   • Class 3 severe obesity in adult ([facility])   • Arthritis   • Anxiety and depression   • Mixed hyperlipidemia        Past Medical History:   Diagnosis Date   • Anxiety 10 years   • Arthritis    • Bone spur     spine   • Ear infection    • Headache 2 months   • Heel spur    • Obesity Lifetime    Have lost 80 pounds since 2018   • PONV (postoperative nausea and vomiting)    • Wears glasses         Past Surgical History:   Procedure Laterality Date   • CHOLECYSTECTOMY     • COLONOSCOPY  3 years    No issues   • ENDOMETRIAL ABLATION     • FRACTURE SURGERY  6 years?    Spiral break metal plate right lower leg   • KNEE MENISCAL REPAIR Left    • LEG SURGERY Right     spiral fracture    • TOTAL LAPAROSCOPIC HYSTERECTOMY SALPINGO OOPHORECTOMY Bilateral 2017    Procedure: TOTAL LAPAROSCOPIC HYSTERECTOMY BILATERAL SALPINGECTOMY WITH DAVINCI SI ROBOT, CYSTO;  Surgeon: Greg Mejía MD;  Location: [facility] OR;  Service:    • WISDOM TOOTH EXTRACTION         Visit Dx:     ICD-10-CM ICD-9-CM   1. Chronic low back pain without sciatica, unspecified back pain laterality  M54.50 724.2    G89.29 338.29   2. Class 3 severe obesity without serious comorbidity with body mass index (BMI) of 40.0 to 44.9 in adult, unspecified obesity type ([facility])  E66.01 278.01    Z68.41 V85.41          Patient History     Row Name 10/04/22 0800 10/03/22 1256          History    Chief Complaint Pain  -AJ Pain (P)    -patient     Type of Pain Back pain  -AJ Back pain (P)    -patient      Date Current Problem(s) Began --  Chronic for about 5 years  -AJ 10/03/22 (P)    -patient     Brief Description of Current Complaint ptient reports she has had issues with LBP on/off for about 5 years. She reprots she lost 80# and the low back pain went away. She rperots she has now gained it back and the back pain has returned. She reports she knows her weigt contributes. She reports she used to walk 4-5 miles a day but got achilles tendonitis and has been unabel to do that.  -AJ Back specifically lower and between shoulders. If i walk too much my right leg swells and hurts to walk (P)    -patient     Previous treatment for THIS PROBLEM Medication  -AJ --     Patient/Caregiver Goals Relieve pain;Return to prior level of function;Know what to do to help the symptoms  -AJ Relieve pain;Return to prior level of function;Know what to do to help the symptoms (P)    -patient     Current Tobacco Use None  -AJ --     Smoking Status Non-smoker  -AJ --     Patient's Rating of General Health Good  - --     Hand Dominance -- right-handed (P)    -patient     Occupation/sports/leisure activities Occupation: , sit and stand alternatying throughout the day; Hobbies: watch TV, adult coloring, reading  -AJ Not much (P)    -patient     Patient seeing anyone else for problem(s)? PCP  -AJ No (P)    -patient     What clinical tests have you had for this problem? X-ray  -AJ X-ray (P)    -patient     Results of Clinical Tests arthritis and bone spurs  - --     History of Previous Related Injuries None  - --     Are you or can you be pregnant -- No (P)    -patient            Pain     Pain Location Back  -AJ --     Pain at Present 3  -AJ --     Pain at Best 3  -AJ --     Pain at Worst 7  -AJ --     Pain Frequency Constant/continuous  -AJ --     Pain Description Burning;Aching  -AJ --     What Performance Factors Make the Current Problem(s) WORSE? A lot of walking, a lot of up/down, car rides >6 hours.  -AJ --     What  Performance Factors Make the Current Problem(s) BETTER? Heating pad at work and heated blanket at home  -AJ --     Is your sleep disturbed? Yes  -AJ --     Is medication used to assist with sleep? No  -AJ --            Fall Risk Assessment    Any falls in the past year: -- Yes (P)    -patient     Factors that contributed to the fall: -- Tripped (P)    -patient            Services    Prior Rehab/Home Health Experiences -- No (P)    -patient     Are you currently receiving Home Health services -- No (P)    -patient     Do you plan to receive Home Health services in the near future -- No (P)    -patient            Daily Activities    Primary Language -- English (P)    -patient     Are you able to read -- Yes (P)    -patient     Are you able to write -- Yes (P)    -patient     How does patient learn best? -- Listening;Reading;Demonstration (P)    -patient            Safety    Are you being hurt, hit, or frightened by anyone at home or in your life? -- No (P)    -patient     Are you being neglected by a caregiver -- No (P)    -patient     Have you had any of the following issues with -- Depression;Anxiety;Panic Attacks (P)    -patient           User Key  (r) = Recorded By, (t) = Taken By, (c) = Cosigned By    Initials Name Provider Type    AJ Kaur Bond, PT DPT Physical Therapist    patient Alyssa Randhawa --                 PT Ortho     Row Name 10/04/22 0800       Subjective Comments    Subjective Comments see history  -AJ       Precautions and Contraindications    Precautions/Limitations no known precautions/limitations  -AJ       Subjective Pain    Able to rate subjective pain? yes  -AJ    Pre-Treatment Pain Level 3  -AJ       Posture/Observations    Alignment Options Forward head;Thoracic kyphosis;Scoliosis;Lumbar lordosis;Iliac crests  -AJ    Forward Head Mild;Increased  -AJ    Thoracic Kyphosis Mild;Moderate;Increased  -AJ    Scoliosis Normal  -AJ    Lumbar lordosis Mild;Increased  -AJ    Iliac crests  Bilateral:;Normal  Pelvis appears to be level, no LLD to note.  -AJ    Posture/Observations Comments No distress, poor overall postural awareness.  -AJ       DTR- Lower Quarter Clearing    Patellar tendon (L2-4) Bilateral:;2- Normal response  -AJ    Achilles tendon (S1-2) Bilateral:;2- Normal response  -AJ       Sensory Screen for Light Touch- Lower Quarter Clearing    L1 (inguinal area) Bilateral:;Intact  -AJ    L2 (anterior mid thigh) Bilateral:;Intact  -AJ    L3 (distal anterior thigh) Bilateral:;Intact  -AJ    L4 (medial lower leg/foot) Bilateral:;Intact  -AJ    L5 (lateral lower leg/great toe) Bilateral:;Intact  -AJ       Lumbar/SI Special Tests    Standing Flexion Test (SI Dysfunction) Bilateral:;Negative  -AJ    Stork Test (SI Dysfunction) Bilateral:;Negative  -AJ    Slump Test (Neural Tension) Bilateral:;Negative  -AJ    SLR (Neural Tension) Bilateral:;Negative  -AJ    SI Compression Test (SI Dysfunction) Bilateral:;Negative  -AJ    SI Distraction Test (SI Dysfunction) Bilateral:;Negative  -AJ    MANDY (hip vs. SI Dysfunction) Bilateral:;Negative  -AJ    FAIR Test (Piriformis Syndrome) Bilateral:;Negative  -AJ    Sacral Spring Test (SI Dysfunction) Negative  -AJ       Lumbosacral Palpation    Lumbosacral Segment Bilateral:;Tender  -AJ    Thoracolumbar Segment Bilateral:;Tender  -AJ    Erector Spinae (Paraspinals) Bilateral:;Tender  -AJ       Head/Neck/Trunk    Trunk Extension AROM 38°  -AJ    Trunk Flexion AROM 90°, fingertips to the toes  -AJ    Trunk Lt Lateral Flexion AROM 50% of range  -AJ    Trunk Rt Lateral Flexion AROM 75% of range  -AJ    Trunk Lt Rotation AROM 75% of range  -AJ    Trunk Rt Rotation AROM 75% of range  -AJ       MMT (Manual Muscle Testing)    General MMT Comments B LE 5/5.  -AJ       Sensation    Sensation WNL? WNL  -AJ    Light Touch No apparent deficits  -AJ    Additional Comments Denies any radicular s/s.  -AJ       Lower Extremity Flexibility    Hamstrings Bilateral:;WNL  0° angle  in 90-90 position  -AJ    LE Other Flexibility Bilateral:;Mildly limited;Moderately limited  piriformis  -AJ       Pathomechanics    Spine Pathomechanics Excessive thoracic kyphosis with forward bend;Hinges into extension at one segment in lumbar  -AJ       Transfers    Comment, (Transfers) I with all transfers, absent log roll technique.  -AJ       Gait/Stairs (Locomotion)    Comment, (Gait/Stairs) FWB, non-antalgic giat, no assistive device, no signficant gait deviation.  -          User Key  (r) = Recorded By, (t) = Taken By, (c) = Cosigned By    Initials Name Provider Type    Kaur Moeller, PT DPT Physical Therapist                            Therapy Education  Education Details: HEP: all exercises given today  Given: HEP, Symptoms/condition management, Pain management, Posture/body mechanics, Mobility training, Other (comment) (POC)  Program: New  How Provided: Verbal, Demonstration, Written  Provided to: Patient  Level of Understanding: Teach back education performed, Verbalized, Demonstrated      PT OP Goals     Row Name 10/04/22 0800          PT Short Term Goals    STG 1 I with HEP and have additoins/changes by next recertification.  -     STG 2 Patient to be more aware of posture and posture correction techniques.  -     STG 3 Patient able to show proper log roll technique.  -     STG 4 AROM lumbar SB WNL B.  -     STG 5 AROM lumbar ROT WNL B.  -            Long Term Goals    LTG 1 AROM for the lumbar spine all WNL, no increase in pain.  -     LTG 2 Patient able to tolerate 15 minutes of standing TA/core stab actvites with no increase in pain.  -     LTG 3 Patient able ot show proper lifting technique floor ot waist with no increas ein pain.  -     LTG 4 patient able to show proper ergonomics for mopping, sweeping, and vacuuming with no increase in pain.  -     LTG 5 I with ifnal HEP.  -            Time Calculation    PT Goal Re-Cert Due Date 10/25/22  -           User Key   (r) = Recorded By, (t) = Taken By, (c) = Cosigned By    Initials Name Provider Type    AJ Kaur Bond, PT DPT Physical Therapist              Barriers to Rehab: Include significant or possible arthritic/degenerative changes that have occurred within the spine, The chronicity of this issue,The patient's obesity.    Safety Issues: None noted.        PT Assessment/Plan     Row Name 10/04/22 0800          PT Assessment    Functional Limitations Limitation in home management;Limitations in community activities;Performance in leisure activities;Performance in self-care ADL;Performance in work activities  -     Impairments Impaired flexibility;Impaired muscle endurance;Impaired muscle length;Impaired muscle power;Pain;Muscle strength;Poor body mechanics;Posture;Range of motion;Joint integrity;Impaired postural alignment  -AJ     Assessment Comments Patient is an overweight 56yo female who presents to the clinic today with complaints of LB burning. SHe reports heat helps. She has arthritic change on xray per her report. She has some loss of flexibility and ability to adequately activate trans abdominals for good core stab. Skilled PT will address decifics listed. Patient did well with all ther ex and given written copies of HEP exercises.  -AJ     Please refer to paper survey for additional self-reported information Yes  -AJ     Rehab Potential Fair  -AJ     Patient/caregiver participated in establishment of treatment plan and goals Yes  -AJ     Patient would benefit from skilled therapy intervention Yes  -AJ            PT Plan    PT Frequency 2x/week  -AJ     Predicted Duration of Therapy Intervention (PT) 6-10 visits  -AJ     Planned CPT's? PT EVAL MOD COMPLELITY: 65227;PT RE-EVAL: 20997;PT THER PROC EA 15 MIN: 87541;PT THER ACT EA 15 MIN: 29324;PT MANUAL THERAPY EA 15 MIN: 61114;PT NEUROMUSC RE-EDUCATION EA 15 MIN: 05676;PT SELF CARE/HOME MGMT/TRAIN EA 15: 68142;PT HOT OR COLD PACK TREAT MCARE  -AJ     PT  "Plan Comments Progress overall ROM< strength, flexibility, core stab, ergonomics, and spinal protection.  -AJ           User Key  (r) = Recorded By, (t) = Taken By, (c) = Cosigned By    Initials Name Provider Type    Kaur Moeller, PT DPT Physical Therapist            Other therapeutic activities and/or exercises will be prescribed depending on the patient's progress or lack thereof.         OP Exercises     Row Name 10/04/22 0800             Subjective Comments    Subjective Comments see history  -AJ              Subjective Pain    Able to rate subjective pain? yes  -AJ      Pre-Treatment Pain Level 3  -AJ              Exercise 1    Exercise Name 1 LTR  -AJ      Cueing 1 Verbal  -AJ      Reps 1 10  -AJ      Time 1 10\" hold  -AJ              Exercise 2    Exercise Name 2 B supine prirformis S  -AJ      Cueing 2 Verbal;Tactile;Demo  -AJ      Reps 2 2  -AJ      Time 2 30 seconds  -AJ              Exercise 3    Exercise Name 3 Trans abs  -AJ      Cueing 3 Verbal;Tactile;Demo  -AJ      Sets 3 2  -AJ      Reps 3 10  -AJ      Time 3 5\" hold  -AJ              Exercise 4    Exercise Name 4 HLM  -AJ      Sets 4 2  -AJ      Reps 4 10 each LE  -AJ      Time 4 5\" hold  -AJ              Exercise 5    Exercise Name 5 log roll instruction  -AJ      Cueing 5 Verbal;Tactile  -AJ            User Key  (r) = Recorded By, (t) = Taken By, (c) = Cosigned By    Initials Name Provider Type    Kaur Moeller, PT DPT Physical Therapist            All therapeutic interventions performed today were to address current functional limitations and/or deficits in addressing all physical therapy goals.                    Outcome Measure Options: Modified Oswestry  Modified Oswestry  Modified Oswestry Score/Comments: 15/50 = 30%      Time Calculation:     Start Time: 0840  Stop Time: 0927  Time Calculation (min): 47 min  Total Timed Code Minutes- PT: 23 minute(s)     Therapy Charges for Today     Code Description Service Date " Service Provider Modifiers Qty    12418459552 HC PT EVAL MOD COMPLEXITY 2 10/4/2022 Kaur Bond, PT DPT GP 1    79260824014 HC PT THER SUPP EA 15 MIN 10/4/2022 Kaur Bodn, PT DPT GP 1    48768063454 HC PT THER PROC EA 15 MIN 10/4/2022 Kaur Bond, PT DPT GP 2          PT G-Codes  Outcome Measure Options: Modified Oswestry  Modified Oswestry Score/Comments: 15/50 = 30%         This document has been electronically signed by Kaur Bond, PT DPT, [facility] on October 4, 2022 10:16 CDT

## 2022-10-10 ENCOUNTER — HOSPITAL ENCOUNTER (OUTPATIENT)
Dept: PHYSICAL THERAPY | Facility: HOSPITAL | Age: 57
Setting detail: THERAPIES SERIES
Discharge: HOME | End: 2022-10-10

## 2022-10-10 DIAGNOSIS — G89.29 CHRONIC LOW BACK PAIN WITHOUT SCIATICA, UNSPECIFIED BACK PAIN LATERALITY: Primary | ICD-10-CM

## 2022-10-10 DIAGNOSIS — E66.01 CLASS 3 SEVERE OBESITY WITHOUT SERIOUS COMORBIDITY WITH BODY MASS INDEX (BMI) OF 40.0 TO 44.9 IN ADULT, UNSPECIFIED OBESITY TYPE: ICD-10-CM

## 2022-10-10 DIAGNOSIS — M54.50 CHRONIC LOW BACK PAIN WITHOUT SCIATICA, UNSPECIFIED BACK PAIN LATERALITY: Primary | ICD-10-CM

## 2022-10-10 PROCEDURE — 97110 THERAPEUTIC EXERCISES: CPT | Performed by: PHYSICAL THERAPIST

## 2022-10-10 PROCEDURE — 97140 MANUAL THERAPY 1/> REGIONS: CPT | Performed by: PHYSICAL THERAPIST

## 2022-10-10 NOTE — THERAPY TREATMENT NOTE
Outpatient Physical Therapy Ortho Treatment Note  [facility]     Patient Name: Alyssa Randhawa  : 1965  MRN: [id number]  Today's Date: 10/10/2022      Visit Date: 10/10/2022     Attendance: 2 visits  Subjective improvement: N/A  MD appt: 2022  Recheck due: 10/25/2022      Visit Dx:    ICD-10-CM ICD-9-CM   1. Chronic low back pain without sciatica, unspecified back pain laterality  M54.50 724.2    G89.29 338.29   2. Class 3 severe obesity without serious comorbidity with body mass index (BMI) of 40.0 to 44.9 in adult, unspecified obesity type ([facility])  E66.01 278.01    Z68.41 V85.41       Patient Active Problem List   Diagnosis   • Family history of breast cancer in first degree relative   • Class 3 severe obesity in adult ([facility])   • Arthritis   • Anxiety and depression   • Mixed hyperlipidemia        Past Medical History:   Diagnosis Date   • Anxiety 10 years   • Arthritis    • Bone spur     spine   • Ear infection    • Headache 2 months   • Heel spur    • Obesity Lifetime    Have lost 80 pounds since 2018   • PONV (postoperative nausea and vomiting)    • Wears glasses         Past Surgical History:   Procedure Laterality Date   • CHOLECYSTECTOMY     • COLONOSCOPY  3 years    No issues   • ENDOMETRIAL ABLATION     • FRACTURE SURGERY  6 years?    Spiral break metal plate right lower leg   • KNEE MENISCAL REPAIR Left    • LEG SURGERY Right     spiral fracture    • TOTAL LAPAROSCOPIC HYSTERECTOMY SALPINGO OOPHORECTOMY Bilateral 2017    Procedure: TOTAL LAPAROSCOPIC HYSTERECTOMY BILATERAL SALPINGECTOMY WITH DAVINCI SI ROBOT, CYSTO;  Surgeon: Greg Mejía MD;  Location: [facility] OR;  Service:    • WISDOM TOOTH EXTRACTION          PT Ortho     Row Name 10/10/22 0800       Subjective Comments    Subjective Comments Spent several hours cleaning the other day, which causes a lot of pain. States she doesn't take breaks, just pushes through until she's done. Overall feels like the  "stretches have helped. No issues with HEP.  -KG       Precautions and Contraindications    Precautions/Limitations no known precautions/limitations  -KG       Subjective Pain    Pre-Treatment Pain Level 1  -KG    Post-Treatment Pain Level 1  -KG       Posture/Observations    Posture/Observations Comments Supine L IC superior vs R. Equal at IC after LLE LAD.  -KG          User Key  (r) = Recorded By, (t) = Taken By, (c) = Cosigned By    Initials Name Provider Type    Milagros Forrester, PT Physical Therapist                             PT Assessment/Plan     Row Name 10/10/22 0800          PT Assessment    Assessment Comments Pt did well with treatment, good recall of HEP. Pt challenged with iso TA/kegel contraction. Tends to crunch/activate upper abdominals. Worked on adding kegel contraction with gentle PPT, which helped with recruitment and performance of iso TA contraction. Responded well to manual interventions.  -KG        PT Plan    PT Frequency 2x/week  -KG     PT Plan Comments Continue iso TA/kegel education. Seated core/posture next visit. Try prone stability next.  -KG           User Key  (r) = Recorded By, (t) = Taken By, (c) = Cosigned By    Initials Name Provider Type    Milagros Forrester, PT Physical Therapist                   OP Exercises     Row Name 10/10/22 0800             Subjective Comments    Subjective Comments Spent several hours cleaning the other day, which causes a lot of pain. States she doesn't take breaks, just pushes through until she's done. Overall feels like the stretches have helped. No issues with HEP.  -KG         Subjective Pain    Able to rate subjective pain? yes  -KG      Pre-Treatment Pain Level 1  -KG      Post-Treatment Pain Level 1  -KG         Exercise 1    Exercise Name 1 Seated otto hamstring stretch  -KG      Cueing 1 Verbal  -KG      Reps 1 2  -KG      Time 1 30\" hold  -KG         Exercise 2    Exercise Name 2 Seated piriformis stretch  -KG      Cueing 2 Verbal  " "-KG      Reps 2 1 ea  -KG      Time 2 30\" hold  -KG         Exercise 3    Exercise Name 3 Seated figure 4 stretch  -KG      Cueing 3 Verbal  -KG      Reps 3 1 ea  -KG      Time 3 30\" hold  -KG         Exercise 4    Exercise Name 4 LTR  -KG      Cueing 4 Verbal  -KG      Sets 4 1  -KG      Reps 4 5  -KG      Time 4 ~10\" hold ea  -KG         Exercise 5    Exercise Name 5 B supine prirformis S  -KG      Reps 5 1 for review  -KG      Time 5 30\" hold  -KG         Exercise 6    Exercise Name 6 Iso TrA/kegel with iso add & PPT  -KG      Cueing 6 Verbal;Tactile  -KG      Time 6 5 min  -KG         Exercise 7    Exercise Name 7 Iso add squeeze with TA/PPT  -KG      Cueing 7 Verbal;Tactile  -KG      Sets 7 1  -KG      Reps 7 10  -KG      Time 7 5\" hold  -KG         Exercise 8    Exercise Name 8 HL hip abd with iso TA  -KG      Cueing 8 Verbal  -KG      Sets 8 2  -KG      Reps 8 10  -KG      Time 8 pause  -KG      Additional Comments red tband  -KG         Exercise 9    Exercise Name 9 Alignment check/manual  -KG         Exercise 10    Exercise Name 10 SL clamshells with iso TA  -KG      Cueing 10 Verbal;Tactile  -KG      Sets 10 1  -KG      Reps 10 15 ea LE  -KG            User Key  (r) = Recorded By, (t) = Taken By, (c) = Cosigned By    Initials Name Provider Type    Milagros Forrester, PT Physical Therapist                         Manual Rx (last 36 hours)     Manual Treatments     Row Name 10/10/22 0800             Manual Rx 1    Manual Rx 1 Location LLE/QL  -KG      Manual Rx 1 Type LAD  -KG         Manual Rx 2    Manual Rx 2 Location Prone lumbar parapsinals/post hips  -KG      Manual Rx 2 Type STM/MFR  -KG            User Key  (r) = Recorded By, (t) = Taken By, (c) = Cosigned By    Initials Name Provider Type    Milagros Forrester, PT Physical Therapist                 PT OP Goals     Row Name 10/10/22 0800          PT Short Term Goals    STG 1 I with HEP and have additoins/changes by next recertification.  -KG     " STG 1 Progress Progressing  -KG     STG 2 Patient to be more aware of posture and posture correction techniques.  -KG     STG 2 Progress Progressing  -KG     STG 3 Patient able to show proper log roll technique.  -KG     STG 3 Progress Progressing  -KG     STG 4 AROM lumbar SB WNL B.  -KG     STG 4 Progress Progressing  -KG     STG 5 AROM lumbar ROT WNL B.  -KG     STG 5 Progress Progressing  -KG        Long Term Goals    LTG 1 AROM for the lumbar spine all WNL, no increase in pain.  -KG     LTG 2 Patient able to tolerate 15 minutes of standing TA/core stab actvites with no increase in pain.  -KG     LTG 3 Patient able ot show proper lifting technique floor ot waist with no increas ein pain.  -KG     LTG 4 patient able to show proper ergonomics for mopping, sweeping, and vacuuming with no increase in pain.  -KG     LTG 5 I with ifnal HEP.  -KG        Time Calculation    PT Goal Re-Cert Due Date 10/25/22  -KG           User Key  (r) = Recorded By, (t) = Taken By, (c) = Cosigned By    Initials Name Provider Type    Milagros Forrester, PT Physical Therapist                Therapy Education  Education Details: Seated HS, piriformis, figure 4 stretch. Add iso adduction squeeze to kegel/iso TA contraction  Given: HEP, Symptoms/condition management, Pain management, Posture/body mechanics, Mobility training  Program: Reinforced, Progressed  How Provided: Verbal, Demonstration, Written  Provided to: Patient  Level of Understanding: Teach back education performed, Verbalized, Demonstrated              Time Calculation:   Start Time: 0803  Stop Time: 0845  Time Calculation (min): 42 min  Therapy Charges for Today     Code Description Service Date Service Provider Modifiers Qty    15006409208 HC PT THER PROC EA 15 MIN 10/10/2022 Milagros Zapata, PT GP 2    70561142431 HC PT MANUAL THERAPY EA 15 MIN 10/10/2022 Milagros Zapata, PT GP 1                    Milagros Zapata PT  10/10/2022

## 2022-10-12 ENCOUNTER — HOSPITAL ENCOUNTER (OUTPATIENT)
Dept: PHYSICAL THERAPY | Facility: HOSPITAL | Age: 57
Setting detail: THERAPIES SERIES
Discharge: HOME | End: 2022-10-12

## 2022-10-12 DIAGNOSIS — M54.50 CHRONIC LOW BACK PAIN WITHOUT SCIATICA, UNSPECIFIED BACK PAIN LATERALITY: Primary | ICD-10-CM

## 2022-10-12 DIAGNOSIS — G89.29 CHRONIC LOW BACK PAIN WITHOUT SCIATICA, UNSPECIFIED BACK PAIN LATERALITY: Primary | ICD-10-CM

## 2022-10-12 DIAGNOSIS — E66.01 CLASS 3 SEVERE OBESITY WITHOUT SERIOUS COMORBIDITY WITH BODY MASS INDEX (BMI) OF 40.0 TO 44.9 IN ADULT, UNSPECIFIED OBESITY TYPE: ICD-10-CM

## 2022-10-12 PROCEDURE — 97140 MANUAL THERAPY 1/> REGIONS: CPT | Performed by: PHYSICAL THERAPIST

## 2022-10-12 PROCEDURE — 97110 THERAPEUTIC EXERCISES: CPT | Performed by: PHYSICAL THERAPIST

## 2022-10-13 NOTE — THERAPY TREATMENT NOTE
"    Outpatient Physical Therapy Ortho Treatment Note  [facility]     Patient Name: Alyssa Randhawa  : 1965  MRN: [id number]  Today's Date: 10/12/2022      Visit Date: 10/12/2022     Attendance: 3 visits  Subjective improvement: \" a little better\"  MD appt: 2023  Recheck due: 10/25/2022      Visit Dx:    ICD-10-CM ICD-9-CM   1. Chronic low back pain without sciatica, unspecified back pain laterality  M54.50 724.2    G89.29 338.29   2. Class 3 severe obesity without serious comorbidity with body mass index (BMI) of 40.0 to 44.9 in adult, unspecified obesity type (HCC)  E66.01 278.01    Z68.41 V85.41       Patient Active Problem List   Diagnosis   • Family history of breast cancer in first degree relative   • Class 3 severe obesity in adult (HCC)   • Arthritis   • Anxiety and depression   • Mixed hyperlipidemia        Past Medical History:   Diagnosis Date   • Anxiety 10 years   • Arthritis    • Bone spur     spine   • Ear infection    • Headache 2 months   • Heel spur    • Obesity Lifetime    Have lost 80 pounds since 2018   • PONV (postoperative nausea and vomiting)    • Wears glasses         Past Surgical History:   Procedure Laterality Date   • CHOLECYSTECTOMY     • COLONOSCOPY  3 years    No issues   • ENDOMETRIAL ABLATION     • FRACTURE SURGERY  6 years?    Spiral break metal plate right lower leg   • KNEE MENISCAL REPAIR Left    • LEG SURGERY Right     spiral fracture    • TOTAL LAPAROSCOPIC HYSTERECTOMY SALPINGO OOPHORECTOMY Bilateral 2017    Procedure: TOTAL LAPAROSCOPIC HYSTERECTOMY BILATERAL SALPINGECTOMY WITH DAVINCI SI ROBOT, CYSTO;  Surgeon: Greg Mejía MD;  Location: [facility] OR;  Service:    • WISDOM TOOTH EXTRACTION          PT Ortho     Row Name 10/12/22 0800       Precautions and Contraindications    Precautions/Limitations no known precautions/limitations  -KG       Subjective Pain    Post-Treatment Pain Level 0  -KG       Posture/Observations    " "Posture/Observations Comments No malalignment at pelvis this date  -KG          User Key  (r) = Recorded By, (t) = Taken By, (c) = Cosigned By    Initials Name Provider Type    Milagros Forrester PT Physical Therapist                             PT Assessment/Plan     Row Name 10/12/22 0800          PT Assessment    Assessment Comments Pt did well with introduction to seated posture/core stabilization activities. Mild cues needed to maintain good PN positioning. Tends to arch back but does better after working on pelvic tilts. Good form with bridges therefore added to HEP. Pt had good decrease in pain levels post treatment.  -KG        PT Plan    PT Frequency 2x/week  -KG     PT Plan Comments Continue seated core stability. Try standing marching and hip abd SLR next.  -KG           User Key  (r) = Recorded By, (t) = Taken By, (c) = Cosigned By    Initials Name Provider Type    Milagros Forrester PT Physical Therapist                   OP Exercises     Row Name 10/12/22 0800             Subjective Comments    Subjective Comments States she woke up this morning sore & stiff in her back.  -KG         Subjective Pain    Able to rate subjective pain? yes  -KG      Pre-Treatment Pain Level 4  -KG      Post-Treatment Pain Level 0  -KG         Exercise 1    Exercise Name 1 Pro II BLE for endurance/strength; warm-up  -KG      Cueing 1 Verbal  -KG      Time 1 6 min  -KG      Additional Comments L 3.0  -KG         Exercise 2    Exercise Name 2 Seated otto hamstring stretch  -KG      Cueing 2 Verbal  -KG      Reps 2 2  -KG      Time 2 30\" hold  -KG         Exercise 3    Exercise Name 3 Seated piriformis stretch  -KG      Reps 3 2  -KG      Time 3 30\" hold  -KG         Exercise 4    Exercise Name 4 Seated figure 4 stretch  -KG      Reps 4 2  -KG      Time 4 30\" hold  -KG         Exercise 5    Exercise Name 5 Seated PN/TA & pelvic tilt education  -KG      Time 5 3 min  -KG         Exercise 6    Exercise Name 6 Seated PN/TA " "alt LAQ  -KG      Cueing 6 Verbal  -KG      Sets 6 2  -KG      Reps 6 10  -KG         Exercise 7    Exercise Name 7 Seated PN/TA alt marching  -KG      Cueing 7 Verbal  -KG      Sets 7 2  -KG      Reps 7 10  -KG         Exercise 8    Exercise Name 8 HL piriformis stretch  -KG      Cueing 8 Verbal  -KG      Reps 8 2  -KG      Time 8 30\" hold  -KG         Exercise 9    Exercise Name 9 HL hip abd with iso TA  -KG      Cueing 9 Verbal  -KG      Sets 9 2  -KG      Reps 9 10  -KG      Additional Comments red tband  -KG         Exercise 10    Exercise Name 10 HL iso hip add with TA/kegel  -KG      Cueing 10 Verbal  -KG      Sets 10 1  -KG      Reps 10 10  -KG      Time 10 5\" hold  -KG         Exercise 11    Exercise Name 11 Bridges with TA  -KG      Cueing 11 Verbal  -KG      Sets 11 2  -KG      Reps 11 10  -KG      Time 11 3-5\" hold  -KG         Exercise 12    Exercise Name 12 Prone heel squeeze/hip IR  -KG      Cueing 12 Verbal  -KG      Reps 12 1x15  -KG      Time 12 5\" hold  -KG      Additional Comments yellow tband  -KG            User Key  (r) = Recorded By, (t) = Taken By, (c) = Cosigned By    Initials Name Provider Type    KG Milagros Zapata, PT Physical Therapist                         Manual Rx (last 36 hours)     Manual Treatments     Row Name 10/12/22 0900             Manual Rx 1    Manual Rx 1 Location Prone lumbar parapsinals/post hips  -KG      Manual Rx 1 Type STM/MFR  -KG            User Key  (r) = Recorded By, (t) = Taken By, (c) = Cosigned By    Initials Name Provider Type    KG Milagros Zapata, PT Physical Therapist                 PT OP Goals     Row Name 10/12/22 0800          PT Short Term Goals    STG 1 I with HEP and have additoins/changes by next recertification.  -KG     STG 1 Progress Progressing  -KG     STG 2 Patient to be more aware of posture and posture correction techniques.  -KG     STG 2 Progress Progressing  -KG     STG 3 Patient able to show proper log roll technique.  -KG     STG " 3 Progress Met  -KG     STG 4 AROM lumbar SB WNL B.  -KG     STG 4 Progress Progressing  -KG     STG 5 AROM lumbar ROT WNL B.  -KG     STG 5 Progress Progressing  -KG        Long Term Goals    LTG 1 AROM for the lumbar spine all WNL, no increase in pain.  -KG     LTG 2 Patient able to tolerate 15 minutes of standing TA/core stab actvites with no increase in pain.  -KG     LTG 3 Patient able ot show proper lifting technique floor ot waist with no increas ein pain.  -KG     LTG 4 patient able to show proper ergonomics for mopping, sweeping, and vacuuming with no increase in pain.  -KG     LTG 5 I with ifnal HEP.  -KG        Time Calculation    PT Goal Re-Cert Due Date 10/25/22  -KG           User Key  (r) = Recorded By, (t) = Taken By, (c) = Cosigned By    Initials Name Provider Type    KG Milagros Zapata, PT Physical Therapist                Therapy Education  Education Details: Bridges and seated posture  Given: HEP, Symptoms/condition management, Pain management, Posture/body mechanics, Mobility training  Program: Reinforced, Progressed  How Provided: Verbal, Demonstration, Written  Provided to: Patient  Level of Understanding: Teach back education performed, Verbalized, Demonstrated              Time Calculation:   Start Time: 0846  Stop Time: 0929  Time Calculation (min): 43 min  Therapy Charges for Today     Code Description Service Date Service Provider Modifiers Qty    28292813471 HC PT THER PROC EA 15 MIN 10/12/2022 Milagros Zapata, PT GP 2    41925598046 HC PT MANUAL THERAPY EA 15 MIN 10/12/2022 Milagros Zapata, PT GP 1                    Milagros Zapata PT  10/12/2022

## 2022-10-17 ENCOUNTER — HOSPITAL ENCOUNTER (OUTPATIENT)
Dept: PHYSICAL THERAPY | Facility: HOSPITAL | Age: 57
Setting detail: THERAPIES SERIES
Discharge: HOME | End: 2022-10-17

## 2022-10-17 DIAGNOSIS — E66.01 CLASS 3 SEVERE OBESITY WITHOUT SERIOUS COMORBIDITY WITH BODY MASS INDEX (BMI) OF 40.0 TO 44.9 IN ADULT, UNSPECIFIED OBESITY TYPE: ICD-10-CM

## 2022-10-17 DIAGNOSIS — M54.50 CHRONIC LOW BACK PAIN WITHOUT SCIATICA, UNSPECIFIED BACK PAIN LATERALITY: Primary | ICD-10-CM

## 2022-10-17 DIAGNOSIS — G89.29 CHRONIC LOW BACK PAIN WITHOUT SCIATICA, UNSPECIFIED BACK PAIN LATERALITY: Primary | ICD-10-CM

## 2022-10-17 PROCEDURE — 97140 MANUAL THERAPY 1/> REGIONS: CPT | Performed by: PHYSICAL THERAPIST

## 2022-10-17 PROCEDURE — 97110 THERAPEUTIC EXERCISES: CPT | Performed by: PHYSICAL THERAPIST

## 2022-10-17 NOTE — THERAPY TREATMENT NOTE
"    Outpatient Physical Therapy Ortho Treatment Note  [facility]     Patient Name: Alyssa Randhawa  : 1965  MRN: [id number]  Today's Date: 10/17/2022      Visit Date: 10/17/2022     Attendance: 4 visits  Subjective improvement: \"I'm doing a lot better\" >50% improvement  MD appt: 2023  Recheck due: 10/25/2022    Visit Dx:    ICD-10-CM ICD-9-CM   1. Chronic low back pain without sciatica, unspecified back pain laterality  M54.50 724.2    G89.29 338.29   2. Class 3 severe obesity without serious comorbidity with body mass index (BMI) of 40.0 to 44.9 in adult, unspecified obesity type (HCC)  E66.01 278.01    Z68.41 V85.41       Patient Active Problem List   Diagnosis   • Family history of breast cancer in first degree relative   • Class 3 severe obesity in adult (HCC)   • Arthritis   • Anxiety and depression   • Mixed hyperlipidemia        Past Medical History:   Diagnosis Date   • Anxiety 10 years   • Arthritis    • Bone spur     spine   • Ear infection    • Headache 2 months   • Heel spur    • Obesity Lifetime    Have lost 80 pounds since 2018   • PONV (postoperative nausea and vomiting)    • Wears glasses         Past Surgical History:   Procedure Laterality Date   • CHOLECYSTECTOMY     • COLONOSCOPY  3 years    No issues   • ENDOMETRIAL ABLATION     • FRACTURE SURGERY  6 years?    Spiral break metal plate right lower leg   • KNEE MENISCAL REPAIR Left    • LEG SURGERY Right     spiral fracture    • TOTAL LAPAROSCOPIC HYSTERECTOMY SALPINGO OOPHORECTOMY Bilateral 2017    Procedure: TOTAL LAPAROSCOPIC HYSTERECTOMY BILATERAL SALPINGECTOMY WITH DAVINCI SI ROBOT, CYSTO;  Surgeon: Greg Mejía MD;  Location: [facility];  Service:    • WISDOM TOOTH EXTRACTION          PT Ortho     Row Name 10/17/22 0800       Precautions and Contraindications    Precautions/Limitations no known precautions/limitations  -KG       Posture/Observations    Posture/Observations Comments Does well " "self-correcting both standing and seated posture. No malalignment at pelvis this date  -KG          User Key  (r) = Recorded By, (t) = Taken By, (c) = Cosigned By    Initials Name Provider Type    BALWINDER Milagros Zapata PT Physical Therapist                             PT Assessment/Plan     Row Name 10/17/22 0800          PT Assessment    Assessment Comments Pt did well with progression to standing stability and posture without increased pain. Does well keeping appropriate posture with min cues. Did better with seated PN activities as well. Overall pt doing very well. Increased subjective improvement reported.  -KG        PT Plan    PT Frequency 2x/week  -KG     PT Plan Comments Continue progress standing core stability. Increase reps to 2x10.  -KG           User Key  (r) = Recorded By, (t) = Taken By, (c) = Cosigned By    Initials Name Provider Type    BALWINDER Milagros Zapata, PT Physical Therapist                   OP Exercises     Row Name 10/17/22 0800             Subjective Comments    Subjective Comments Pt states she's doing really good. HEP is really helping. Traveled a lot this weekend and did really well.  -KG         Subjective Pain    Able to rate subjective pain? yes  -KG      Pre-Treatment Pain Level 0  \"just stiff this morning\"  -KG      Post-Treatment Pain Level 0  \"I feel really good right now\"  -KG         Exercise 1    Exercise Name 1 Pro II BLE for endurance/strength; warm-up  -KG      Cueing 1 Verbal  -KG      Time 1 8 min  -KG      Additional Comments L 3.5  -KG         Exercise 2    Exercise Name 2 Standing otto hamstring stretch  -KG      Cueing 2 Verbal  -KG      Reps 2 2  -KG      Time 2 30\" hold  -KG         Exercise 3    Exercise Name 3 Seated piriformis stretch  -KG      Reps 3 1 ea  -KG      Time 3 30\" hold  -KG         Exercise 4    Exercise Name 4 Seated figure 4 stretch  -KG      Reps 4 1 ea  -KG      Time 4 30\" hold  -KG         Exercise 5    Exercise Name 5 Standing alt marching with " "TA  -KG      Cueing 5 Verbal  -KG      Sets 5 2  -KG      Reps 5 10  -KG         Exercise 6    Exercise Name 6 Standing alt hip abd SLR with TA/posture  -KG      Cueing 6 Verbal  -KG      Sets 6 1  -KG      Reps 6 15  -KG         Exercise 7    Exercise Name 7 Standing alt hip ext SLR with TA/posture  -KG      Cueing 7 Verbal  -KG      Sets 7 1  -KG      Reps 7 15  -KG         Exercise 8    Exercise Name 8 Seated PN/TA alt LAQ  -KG      Cueing 8 Verbal  -KG      Sets 8 1  -KG      Reps 8 15  -KG         Exercise 9    Exercise Name 9 Seated PN/TA alt marching  -KG      Cueing 9 Verbal  -KG      Sets 9 1  -KG      Reps 9 15  -KG         Exercise 10    Exercise Name 10 Seated PN/TA tband mid rows  -KG      Cueing 10 Verbal  -KG      Sets 10 2  -KG      Reps 10 10  -KG      Additional Comments red tband  -KG         Exercise 11    Exercise Name 11 Wei hip flexor stretch otto; opp knee bent  -KG      Cueing 11 Verbal  -KG      Reps 11 2  -KG      Time 11 30\" hold  -KG         Exercise 12    Exercise Name 12 Bridges with TA  -KG      Cueing 12 Verbal  -KG      Reps 12 2x10  -KG      Time 12 3-5\" hold  -KG         Exercise 13    Exercise Name 13 SL clamshells with iso TA  -KG      Cueing 13 Verbal  -KG      Sets 13 1  -KG      Reps 13 15 ea LE  -KG         Exercise 14    Exercise Name 14 Prone heel squeeze/hip IR  -KG      Cueing 14 Verbal  -KG      Sets 14 1  -KG      Reps 14 5  -KG      Time 14 5\" hold  -KG      Additional Comments yellow tband loop  -KG            User Key  (r) = Recorded By, (t) = Taken By, (c) = Cosigned By    Initials Name Provider Type    Milagros Forrester, PT Physical Therapist                         Manual Rx (last 36 hours)     Manual Treatments     Row Name 10/17/22 0800             Manual Rx 1    Manual Rx 1 Location Prone lumbar parapsinals/post hips  -KG      Manual Rx 1 Type STM/MFR  -KG            User Key  (r) = Recorded By, (t) = Taken By, (c) = Cosigned By    Initials Name Provider " Type    KG Milagros Zapata, PT Physical Therapist                 PT OP Goals     Row Name 10/17/22 0800          PT Short Term Goals    STG 1 I with HEP and have additoins/changes by next recertification.  -KG     STG 1 Progress Met  -KG     STG 2 Patient to be more aware of posture and posture correction techniques.  -KG     STG 2 Progress Progressing  -KG     STG 3 Patient able to show proper log roll technique.  -KG     STG 3 Progress Met  -KG     STG 4 AROM lumbar SB WNL B.  -KG     STG 4 Progress Progressing  -KG     STG 5 AROM lumbar ROT WNL B.  -KG     STG 5 Progress Progressing  -KG        Long Term Goals    LTG 1 AROM for the lumbar spine all WNL, no increase in pain.  -KG     LTG 2 Patient able to tolerate 15 minutes of standing TA/core stab actvites with no increase in pain.  -KG     LTG 3 Patient able ot show proper lifting technique floor ot waist with no increas ein pain.  -KG     LTG 4 patient able to show proper ergonomics for mopping, sweeping, and vacuuming with no increase in pain.  -KG     LTG 5 I with ifnal HEP.  -KG        Time Calculation    PT Goal Re-Cert Due Date 10/25/22  -KG           User Key  (r) = Recorded By, (t) = Taken By, (c) = Cosigned By    Initials Name Provider Type    KG Milagros Zapata, PT Physical Therapist                Therapy Education  Given: HEP, Symptoms/condition management, Pain management, Posture/body mechanics, Mobility training  Program: Reinforced  How Provided: Verbal  Provided to: Patient  Level of Understanding: Verbalized, Demonstrated              Time Calculation:   Start Time: 0800  Stop Time: 0847  Time Calculation (min): 47 min  Therapy Charges for Today     Code Description Service Date Service Provider Modifiers Qty    01609935019 HC PT THER PROC EA 15 MIN 10/17/2022 Milagros Zapata, PT GP 2    06449333689 HC PT MANUAL THERAPY EA 15 MIN 10/17/2022 Milagros Zapata, PT GP 1                    Milagros Zapata PT  10/17/2022

## 2022-10-19 ENCOUNTER — HOSPITAL ENCOUNTER (OUTPATIENT)
Dept: PHYSICAL THERAPY | Facility: HOSPITAL | Age: 57
Setting detail: THERAPIES SERIES
Discharge: HOME | End: 2022-10-19

## 2022-10-19 DIAGNOSIS — E66.01 CLASS 3 SEVERE OBESITY WITHOUT SERIOUS COMORBIDITY WITH BODY MASS INDEX (BMI) OF 40.0 TO 44.9 IN ADULT, UNSPECIFIED OBESITY TYPE: ICD-10-CM

## 2022-10-19 DIAGNOSIS — G89.29 CHRONIC LOW BACK PAIN WITHOUT SCIATICA, UNSPECIFIED BACK PAIN LATERALITY: Primary | ICD-10-CM

## 2022-10-19 DIAGNOSIS — M54.50 CHRONIC LOW BACK PAIN WITHOUT SCIATICA, UNSPECIFIED BACK PAIN LATERALITY: Primary | ICD-10-CM

## 2022-10-19 PROCEDURE — 97110 THERAPEUTIC EXERCISES: CPT | Performed by: PHYSICAL THERAPIST

## 2022-10-19 PROCEDURE — 97140 MANUAL THERAPY 1/> REGIONS: CPT | Performed by: PHYSICAL THERAPIST

## 2022-10-19 NOTE — THERAPY TREATMENT NOTE
"    Outpatient Physical Therapy Ortho Treatment Note  [facility]     Patient Name: Alyssa Randhawa  : 1965  MRN: [id number]  Today's Date: 10/19/2022      Visit Date: 10/19/2022     Attendance: 5 visits  Subjective improvement: \"I'm doing a lot better\" >50% improvement  MD appt: 2023  Recheck due: 10/25/2022    Visit Dx:    ICD-10-CM ICD-9-CM   1. Chronic low back pain without sciatica, unspecified back pain laterality  M54.50 724.2    G89.29 338.29   2. Class 3 severe obesity without serious comorbidity with body mass index (BMI) of 40.0 to 44.9 in adult, unspecified obesity type (HCC)  E66.01 278.01    Z68.41 V85.41       Patient Active Problem List   Diagnosis   • Family history of breast cancer in first degree relative   • Class 3 severe obesity in adult (HCC)   • Arthritis   • Anxiety and depression   • Mixed hyperlipidemia        Past Medical History:   Diagnosis Date   • Anxiety 10 years   • Arthritis    • Bone spur     spine   • Ear infection    • Headache 2 months   • Heel spur    • Obesity Lifetime    Have lost 80 pounds since 2018   • PONV (postoperative nausea and vomiting)    • Wears glasses         Past Surgical History:   Procedure Laterality Date   • CHOLECYSTECTOMY     • COLONOSCOPY  3 years    No issues   • ENDOMETRIAL ABLATION     • FRACTURE SURGERY  6 years?    Spiral break metal plate right lower leg   • KNEE MENISCAL REPAIR Left    • LEG SURGERY Right     spiral fracture    • TOTAL LAPAROSCOPIC HYSTERECTOMY SALPINGO OOPHORECTOMY Bilateral 2017    Procedure: TOTAL LAPAROSCOPIC HYSTERECTOMY BILATERAL SALPINGECTOMY WITH DAVINCI SI ROBOT, CYSTO;  Surgeon: Greg Mejía MD;  Location: [facility];  Service:    • WISDOM TOOTH EXTRACTION          PT Ortho     Row Name 10/19/22 0800       Subjective Comments    Subjective Comments Pt states back a little sore this morning \"but it usually is\". States she feels like she's learning a lot about what exercises to " Messaged mom back to discuss possible increase in pts medication.  Mom did send a telephone encounter, but would rather go through e-advice    "do and how to manage her symptoms.  -KG       Precautions and Contraindications    Precautions/Limitations no known precautions/limitations  -KG       Subjective Pain    Post-Treatment Pain Level 0  -KG       Posture/Observations    Posture/Observations Comments No malalignment at pelvis this date. Less TTP at lumbar parapsinals; not as taut  -KG          User Key  (r) = Recorded By, (t) = Taken By, (c) = Cosigned By    Initials Name Provider Type    Milagros Forrester, PT Physical Therapist                             PT Assessment/Plan     Row Name 10/19/22 0800          PT Assessment    Assessment Comments Pt a little more challenged wtih added dynadisc to seated core activities but did well with cues. Continues to do well with standing stability progressions. No malalignment noted at pelvis this date. Updated HEP accordingly; pt doing quite well overall.  -KG        PT Plan    PT Frequency 2x/week  -KG     PT Plan Comments Continue dynadisc seated core. May try multifid step ups next.  -KG           User Key  (r) = Recorded By, (t) = Taken By, (c) = Cosigned By    Initials Name Provider Type    Milagros Forrester, PT Physical Therapist                   OP Exercises     Row Name 10/19/22 0800             Subjective Comments    Subjective Comments Pt states back a little sore this morning \"but it usually is\". States she feels like she's learning a lot about what exercises to do and how to manage her symptoms.  -KG         Subjective Pain    Able to rate subjective pain? yes  -KG      Pre-Treatment Pain Level 2  -KG      Post-Treatment Pain Level 0  -KG         Exercise 1    Exercise Name 1 Pro II BLE for endurance/strength; warm-up  -KG      Cueing 1 Verbal  -KG      Time 1 8 min  -KG         Exercise 2    Exercise Name 2 Standing otto hamstring stretch  -KG      Cueing 2 Verbal  -KG      Reps 2 2  -KG      Time 2 30\" hold  -KG         Exercise 3    Exercise Name 3 Seated figure 4 stretch  -KG      Reps 3 1 ea " " -KG      Time 3 30\" hold  -KG         Exercise 4    Exercise Name 4 Airex Standing alt marching with TA  -KG      Cueing 4 Verbal  -KG      Sets 4 2  -KG      Reps 4 10  -KG         Exercise 5    Exercise Name 5 Fwd step ups  -KG      Cueing 5 Verbal  -KG      Sets 5 1  -KG      Reps 5 15 ea LE  -KG      Additional Comments 6\" step  -KG         Exercise 6    Exercise Name 6 Lateral step ups  -KG      Cueing 6 Verbal  -KG      Sets 6 1  -KG      Reps 6 15 ea LE  -KG      Additional Comments 6\" step  -KG         Exercise 7    Exercise Name 7 Physioball wall squats with TA/kegel  -KG      Cueing 7 Verbal  -KG      Sets 7 2  -KG      Reps 7 10  -KG      Additional Comments orange pball  -KG         Exercise 8    Exercise Name 8 Standing tband pallof press for stability  -KG      Cueing 8 Verbal  -KG      Sets 8 1  -KG      Reps 8 15 ea direction  -KG      Additional Comments green tband  -KG         Exercise 9    Exercise Name 9 Dynadisc Seated PN/TA alt LAQ  -KG      Cueing 9 Verbal  -KG      Sets 9 2  -KG      Reps 9 10  -KG         Exercise 10    Exercise Name 10 Dynadisc Seated PN/TA alt marching  -KG      Cueing 10 Verbal  -KG      Sets 10 2  -KG      Reps 10 10  -KG         Exercise 11    Exercise Name 11 HL piriformis stretch  -KG      Cueing 11 Verbal  -KG      Reps 11 1 ea LE  -KG      Time 11 30\" hold  -KG         Exercise 12    Exercise Name 12 Wei hip flexor stretch otto; opp knee bent  -KG      Cueing 12 Verbal  -KG      Reps 12 2  -KG      Time 12 30\" hold  -KG         Exercise 13    Exercise Name 13 Bridges/iso add with TA  -KG      Cueing 13 Verbal  -KG      Sets 13 2  -KG      Reps 13 10  -KG      Time 13 3-5\" hold  -KG         Exercise 14    Exercise Name 14 Prone TKE/glute sets  -KG      Cueing 14 Verbal  -KG      Sets 14 1  -KG      Reps 14 15  -KG      Time 14 5\" hold  -KG            User Key  (r) = Recorded By, (t) = Taken By, (c) = Cosigned By    Initials Name Provider Type    BALWINDER Zapata" Milagros JACOBSON, PT Physical Therapist                         Manual Rx (last 36 hours)     Manual Treatments     Row Name 10/19/22 0800             Manual Rx 1    Manual Rx 1 Location Prone lumbar parapsinals/post hips  -KG      Manual Rx 1 Type STM/MFR  -KG            User Key  (r) = Recorded By, (t) = Taken By, (c) = Cosigned By    Initials Name Provider Type    BALWINDER Milagros Zapata, PT Physical Therapist                 PT OP Goals     Row Name 10/19/22 0800          PT Short Term Goals    STG 1 I with HEP and have additoins/changes by next recertification.  -KG     STG 1 Progress Met  -KG     STG 2 Patient to be more aware of posture and posture correction techniques.  -KG     STG 2 Progress Met  -KG     STG 3 Patient able to show proper log roll technique.  -KG     STG 3 Progress Met  -KG     STG 4 AROM lumbar SB WNL B.  -KG     STG 4 Progress Progressing  -KG     STG 5 AROM lumbar ROT WNL B.  -KG     STG 5 Progress Progressing  -KG        Long Term Goals    LTG 1 AROM for the lumbar spine all WNL, no increase in pain.  -KG     LTG 1 Progress Progressing  -KG     LTG 2 Patient able to tolerate 15 minutes of standing TA/core stab actvites with no increase in pain.  -KG     LTG 2 Progress Progressing  -KG     LTG 3 Patient able ot show proper lifting technique floor ot waist with no increas ein pain.  -KG     LTG 3 Progress Progressing  -KG     LTG 4 patient able to show proper ergonomics for mopping, sweeping, and vacuuming with no increase in pain.  -KG     LTG 4 Progress Progressing  -KG     LTG 5 I with ifnal HEP.  -KG     LTG 5 Progress Progressing  -KG        Time Calculation    PT Goal Re-Cert Due Date 10/25/22  -KG           User Key  (r) = Recorded By, (t) = Taken By, (c) = Cosigned By    Initials Name Provider Type    BALWINDER Milagros Zapata, PT Physical Therapist                Therapy Education  Education Details: Added SL clams, standing hip abd/ext SLR, hip flexor stretch  Given: HEP, Symptoms/condition  management, Pain management, Posture/body mechanics  Program: Reinforced, Progressed  How Provided: Verbal, Demonstration, Written  Provided to: Patient  Level of Understanding: Verbalized, Demonstrated              Time Calculation:   Start Time: 0801  Stop Time: 0846  Time Calculation (min): 45 min  Therapy Charges for Today     Code Description Service Date Service Provider Modifiers Qty    02872520549  PT THER PROC EA 15 MIN 10/19/2022 Milagros Zapata, PT GP 2    83043010662  PT MANUAL THERAPY EA 15 MIN 10/19/2022 Milagros Zapata, PT GP 1                    Milagros Zapata, PT  10/19/2022

## 2022-10-24 ENCOUNTER — HOSPITAL ENCOUNTER (OUTPATIENT)
Dept: PHYSICAL THERAPY | Facility: HOSPITAL | Age: 57
Setting detail: THERAPIES SERIES
Discharge: HOME | End: 2022-10-24

## 2022-10-24 DIAGNOSIS — M54.50 CHRONIC LOW BACK PAIN WITHOUT SCIATICA, UNSPECIFIED BACK PAIN LATERALITY: Primary | ICD-10-CM

## 2022-10-24 DIAGNOSIS — E66.01 CLASS 3 SEVERE OBESITY WITHOUT SERIOUS COMORBIDITY WITH BODY MASS INDEX (BMI) OF 40.0 TO 44.9 IN ADULT, UNSPECIFIED OBESITY TYPE: ICD-10-CM

## 2022-10-24 DIAGNOSIS — G89.29 CHRONIC LOW BACK PAIN WITHOUT SCIATICA, UNSPECIFIED BACK PAIN LATERALITY: Primary | ICD-10-CM

## 2022-10-24 PROCEDURE — 97140 MANUAL THERAPY 1/> REGIONS: CPT | Performed by: PHYSICAL THERAPIST

## 2022-10-24 PROCEDURE — 97110 THERAPEUTIC EXERCISES: CPT | Performed by: PHYSICAL THERAPIST

## 2022-10-24 NOTE — THERAPY TREATMENT NOTE
"    Outpatient Physical Therapy Ortho Treatment Note  [facility], [place]     Patient Name: Alyssa Randhawa  : 1965  MRN: [id number]  Today's Date: 10/24/2022      Visit Date: 10/24/2022     Attendance: 6 visits  Subjective improvement: \"I'm doing a lot better\" >50% improvement  MD appt: 2023  Recheck due: 10/25/2022    Visit Dx:    ICD-10-CM ICD-9-CM   1. Chronic low back pain without sciatica, unspecified back pain laterality  M54.50 724.2    G89.29 338.29   2. Class 3 severe obesity without serious comorbidity with body mass index (BMI) of 40.0 to 44.9 in adult, unspecified obesity type (HCC)  E66.01 278.01    Z68.41 V85.41       Patient Active Problem List   Diagnosis   • Family history of breast cancer in first degree relative   • Class 3 severe obesity in adult (HCC)   • Arthritis   • Anxiety and depression   • Mixed hyperlipidemia        Past Medical History:   Diagnosis Date   • Anxiety 10 years   • Arthritis    • Bone spur     spine   • Ear infection    • Headache 2 months   • Heel spur    • Obesity Lifetime    Have lost 80 pounds since 2018   • PONV (postoperative nausea and vomiting)    • Wears glasses         Past Surgical History:   Procedure Laterality Date   • CHOLECYSTECTOMY     • COLONOSCOPY  3 years    No issues   • ENDOMETRIAL ABLATION     • FRACTURE SURGERY  6 years?    Spiral break metal plate right lower leg   • KNEE MENISCAL REPAIR Left    • LEG SURGERY Right     spiral fracture    • TOTAL LAPAROSCOPIC HYSTERECTOMY SALPINGO OOPHORECTOMY Bilateral 2017    Procedure: TOTAL LAPAROSCOPIC HYSTERECTOMY BILATERAL SALPINGECTOMY WITH DAVINCI SI ROBOT, CYSTO;  Surgeon: Greg Mejía MD;  Location: [facility], [place];  Service:    • WISDOM TOOTH EXTRACTION          PT Ortho     Row Name 10/24/22 0800       Precautions and Contraindications    Precautions/Limitations no known precautions/limitations  -KG       Subjective Pain    Post-Treatment Pain Level 0  -KG       " "Posture/Observations    Posture/Observations Comments Supine L IC & ASIS sup vs right. Corrected with MET  -KG          User Key  (r) = Recorded By, (t) = Taken By, (c) = Cosigned By    Initials Name Provider Type    Milagros Forrester, PT Physical Therapist                             PT Assessment/Plan     Row Name 10/24/22 0800          PT Assessment    Assessment Comments Pt continues to do well with treatment. Better performance with seated dynadisc core in regard to mainting good PN/postural positioning. Mild cuing needed for lifting mechanics initially but able to demonstrate thereafter appropriately.  -KG        PT Plan    PT Frequency 2x/week  -KG     PT Plan Comments Recheck next visit. Progress to physioball for seated core.  -KG           User Key  (r) = Recorded By, (t) = Taken By, (c) = Cosigned By    Initials Name Provider Type    BALWINDER Milagros Zapata, PT Physical Therapist                   OP Exercises     Row Name 10/24/22 0800             Subjective Comments    Subjective Comments Pt states she feels really good today, no pain.  -KG         Subjective Pain    Able to rate subjective pain? yes  -KG      Pre-Treatment Pain Level 0  -KG      Post-Treatment Pain Level 0  -KG         Exercise 1    Exercise Name 1 Pro II BLE for endurance/strength; warm-up  -KG      Cueing 1 Verbal  -KG      Time 1 8 min  -KG      Additional Comments L 4.0  -KG         Exercise 2    Exercise Name 2 Standing otto hamstring stretch  -KG      Cueing 2 Verbal  -KG      Reps 2 2  -KG      Time 2 30\" hold  -KG         Exercise 3    Exercise Name 3 Seated figure 4 stretch  -KG      Reps 3 1 ea  -KG      Time 3 30\" hold  -KG         Exercise 4    Exercise Name 4 Standing hip flexor stretch on stool  -KG      Cueing 4 Verbal  -KG      Reps 4 1 ea LE  -KG      Time 4 30\" hold  -KG         Exercise 5    Exercise Name 5 Dynadisc Seated PN/TA alt LAQ  -KG      Cueing 5 Verbal  -KG      Sets 5 2  -KG      Reps 5 10  -KG         " "Exercise 6    Exercise Name 6 Dynadisc Seated PN/TA alt marching  -KG      Cueing 6 Verbal  -KG      Sets 6 2  -KG      Reps 6 10  -KG         Exercise 7    Exercise Name 7 Fwd multifidi step ups  -KG      Cueing 7 Verbal  -KG      Sets 7 1  -KG      Reps 7 15 ea LE  -KG      Additional Comments 6\" step  -KG         Exercise 8    Exercise Name 8 Lateral step ups with opp hip abd SLR  -KG      Cueing 8 Verbal  -KG      Sets 8 1  -KG      Reps 8 15 ea LE  -KG      Additional Comments 6\" step  -KG         Exercise 9    Exercise Name 9 Physioball wall squats with TA/kegel  -KG      Cueing 9 Verbal  -KG      Sets 9 2  -KG      Reps 9 10  -KG      Additional Comments orange pball  -KG         Exercise 10    Exercise Name 10 standing tband pallof press for stability  -KG      Cueing 10 Verbal  -KG      Sets 10 1  -KG      Reps 10 15 ea direction  -KG      Additional Comments green tband  -KG         Exercise 11    Exercise Name 11 Lifting/ education with crate lift from 6\" step  -KG      Cueing 11 Verbal;Demo  -KG      Sets 11 1  -KG      Reps 11 5  -KG      Additional Comments empty wire crate  -KG         Exercise 12    Exercise Name 12 HL piriformis stretch  -KG      Cueing 12 Verbal  -KG      Reps 12 1 ea LE  -KG      Time 12 30\" hold  -KG         Exercise 13    Exercise Name 13 Bridges/iso add with TA  -KG      Cueing 13 Verbal  -KG      Sets 13 2  -KG      Reps 13 10  -KG      Time 13 3-5\" hold  -KG         Exercise 14    Exercise Name 14 Prone heel squeeze/hip IR  -KG      Cueing 14 Verbal  -KG      Sets 14 1  -KG      Reps 14 15  -KG      Time 14 5\" hold  -KG      Additional Comments yellow tband loop  -KG            User Key  (r) = Recorded By, (t) = Taken By, (c) = Cosigned By    Initials Name Provider Type    Milagros Forrester, PT Physical Therapist                         Manual Rx (last 36 hours)     Manual Treatments     Row Name 10/24/22 0800             Manual Rx 1    Manual Rx 1 Location L " hip flexor, R hamstring  -KG      Manual Rx 1 Type MET performed simultaneously  -KG         Manual Rx 2    Manual Rx 2 Location Prone lumbar parapsinals/post hips  -KG      Manual Rx 2 Type STM/MFR  -KG            User Key  (r) = Recorded By, (t) = Taken By, (c) = Cosigned By    Initials Name Provider Type    BALWINDER Milagros Zapata, PT Physical Therapist                 PT OP Goals     Row Name 10/24/22 0800          PT Short Term Goals    STG 1 I with HEP and have additoins/changes by next recertification.  -KG     STG 1 Progress Met  -KG     STG 2 Patient to be more aware of posture and posture correction techniques.  -KG     STG 2 Progress Met  -KG     STG 3 Patient able to show proper log roll technique.  -KG     STG 3 Progress Met  -KG     STG 4 AROM lumbar SB WNL B.  -KG     STG 4 Progress Progressing  -KG     STG 5 AROM lumbar ROT WNL B.  -KG     STG 5 Progress Progressing  -KG        Long Term Goals    LTG 1 AROM for the lumbar spine all WNL, no increase in pain.  -KG     LTG 1 Progress Progressing  -KG     LTG 2 Patient able to tolerate 15 minutes of standing TA/core stab actvites with no increase in pain.  -KG     LTG 2 Progress Progressing  -KG     LTG 3 Patient able ot show proper lifting technique floor ot waist with no increas ein pain.  -KG     LTG 3 Progress Progressing  -KG     LTG 4 patient able to show proper ergonomics for mopping, sweeping, and vacuuming with no increase in pain.  -KG     LTG 4 Progress Progressing  -KG     LTG 5 I with ifnal HEP.  -KG     LTG 5 Progress Progressing  -KG        Time Calculation    PT Goal Re-Cert Due Date 10/25/22  -KG           User Key  (r) = Recorded By, (t) = Taken By, (c) = Cosigned By    Initials Name Provider Type    Milagros Forrester, PT Physical Therapist                Therapy Education  Given: HEP, Symptoms/condition management, Pain management, Posture/body mechanics  Program: Reinforced  How Provided: Verbal, Demonstration, Written  Provided to:  Patient  Level of Understanding: Verbalized, Demonstrated              Time Calculation:   Start Time: 0802  Stop Time: 0849  Time Calculation (min): 47 min  Therapy Charges for Today     Code Description Service Date Service Provider Modifiers Qty    87816220862 HC PT THER PROC EA 15 MIN 10/24/2022 Milagros Zapata, PT GP 2    67075127207 HC PT MANUAL THERAPY EA 15 MIN 10/24/2022 Milagros Zapata, PT GP 1                    Milagros Zapata, PT  10/24/2022

## 2022-10-26 ENCOUNTER — HOSPITAL ENCOUNTER (OUTPATIENT)
Dept: PHYSICAL THERAPY | Facility: HOSPITAL | Age: 57
Setting detail: THERAPIES SERIES
Discharge: HOME | End: 2022-10-26

## 2022-10-26 DIAGNOSIS — M54.50 CHRONIC LOW BACK PAIN WITHOUT SCIATICA, UNSPECIFIED BACK PAIN LATERALITY: Primary | ICD-10-CM

## 2022-10-26 DIAGNOSIS — E66.01 CLASS 3 SEVERE OBESITY WITHOUT SERIOUS COMORBIDITY WITH BODY MASS INDEX (BMI) OF 40.0 TO 44.9 IN ADULT, UNSPECIFIED OBESITY TYPE: ICD-10-CM

## 2022-10-26 DIAGNOSIS — G89.29 CHRONIC LOW BACK PAIN WITHOUT SCIATICA, UNSPECIFIED BACK PAIN LATERALITY: Primary | ICD-10-CM

## 2022-10-26 PROCEDURE — 97110 THERAPEUTIC EXERCISES: CPT | Performed by: PHYSICAL THERAPIST

## 2022-10-27 NOTE — THERAPY DISCHARGE NOTE
Outpatient Physical Therapy Ortho Progress Note/Discharge Summary  [facility]     Patient Name: Alyssa Randhawa  : 1965  MRN: [id number]  Today's Date: 10/26/2022      Visit Date: 10/26/2022     Attendance: 7 visits  Subjective improvement: 90%  MD appt: 2023  Recheck due: N/A per discharge    Visit Dx:    ICD-10-CM ICD-9-CM   1. Chronic low back pain without sciatica, unspecified back pain laterality  M54.50 724.2    G89.29 338.29   2. Class 3 severe obesity without serious comorbidity with body mass index (BMI) of 40.0 to 44.9 in adult, unspecified obesity type (HCC)  E66.01 278.01    Z68.41 V85.41       Patient Active Problem List   Diagnosis   • Family history of breast cancer in first degree relative   • Class 3 severe obesity in adult (HCC)   • Arthritis   • Anxiety and depression   • Mixed hyperlipidemia        Past Medical History:   Diagnosis Date   • Anxiety 10 years   • Arthritis    • Bone spur     spine   • Ear infection    • Headache 2 months   • Heel spur    • Obesity Lifetime    Have lost 80 pounds since 2018   • PONV (postoperative nausea and vomiting)    • Wears glasses         Past Surgical History:   Procedure Laterality Date   • CHOLECYSTECTOMY     • COLONOSCOPY  3 years    No issues   • ENDOMETRIAL ABLATION     • FRACTURE SURGERY  6 years?    Spiral break metal plate right lower leg   • KNEE MENISCAL REPAIR Left    • LEG SURGERY Right     spiral fracture    • TOTAL LAPAROSCOPIC HYSTERECTOMY SALPINGO OOPHORECTOMY Bilateral 2017    Procedure: TOTAL LAPAROSCOPIC HYSTERECTOMY BILATERAL SALPINGECTOMY WITH DAVINCI SI ROBOT, CYSTO;  Surgeon: Greg Mejía MD;  Location: [facility] OR;  Service:    • WISDOM TOOTH EXTRACTION          PT Ortho     Row Name 10/26/22 0800       Precautions and Contraindications    Precautions/Limitations no known precautions/limitations  -KG       Subjective Pain    Post-Treatment Pain Level 0  -KG       Posture/Observations     "Posture/Observations Comments Good improvement in overall postural awareness. No malalignment noted at pelvis. Able to demonstrate proper lifting/body mechanics with crate lift from 4\" step <> waist with no pain  -KG       Sensory Screen for Light Touch- Lower Quarter Clearing    L1 (inguinal area) Bilateral:;Intact  -KG    L2 (anterior mid thigh) Bilateral:;Intact  -KG    L3 (distal anterior thigh) Bilateral:;Intact  -KG    L4 (medial lower leg/foot) Bilateral:;Intact  -KG    L5 (lateral lower leg/great toe) Bilateral:;Intact  -KG       Special Tests/Palpation    Special Tests/Palpation Lumbar/SI  -KG       Lumbosacral Palpation    Lumbosacral Palpation Comments Minimal TTP at lumbar parapsinals. Not as guarded/taut.  -KG       Head/Neck/Trunk    Trunk Extension AROM WFL no pain  -KG    Trunk Flexion AROM Fingertips to toes, no pain  -KG    Trunk Lt Lateral Flexion AROM WFL no pain  -KG    Trunk Rt Lateral Flexion AROM WFL no pain  -KG    Trunk Lt Rotation AROM WFL no pain  -KG    Trunk Rt Rotation AROM WFL no pain  -KG       MMT (Manual Muscle Testing)    General MMT Comments Bilateral hip, knees, ankle DF/PF 5/5  -KG       Sensation    Sensation WNL? WFL  -KG    Light Touch No apparent deficits  -KG       Transfers    Comment, (Transfers) Good log roll for sup<>sit  -KG       Gait/Stairs (Locomotion)    Comment, (Gait/Stairs) Non-antalgic gait with no AD  -KG          User Key  (r) = Recorded By, (t) = Taken By, (c) = Cosigned By    Initials Name Provider Type    KG Milagros Zapata, PT Physical Therapist                             PT Assessment/Plan     Row Name 10/26/22 0900          PT Assessment    Assessment Comments Pt has progressed very well PT, reporting 90% overall improvement. Pt having minimal to no pain and has met all therapy goals at this time. Pt has been compliant with HEP throughout the course of PT. Per discussion with pt, she feels very comfortable continuing independently. States she is " "very pleased with her progress. Pt able to demonstrate appropriate lifting/body mechanics. Performed thorough review of HEP and did progress and give updated handouts. Pt is discharged at ths time to indep management.  -KG     Rehab Potential Good  -KG     Patient/caregiver participated in establishment of treatment plan and goals Yes  -KG        PT Plan    Predicted Duration of Therapy Intervention (PT) Discharged  -KG     PT Plan Comments Pt discharged to indep management. Pt will follow up PRN with PT with any questions or concerns  -KG           User Key  (r) = Recorded By, (t) = Taken By, (c) = Cosigned By    Initials Name Provider Type    KG Milagros Zapata, PT Physical Therapist                     OP Exercises     Row Name 10/26/22 0800             Subjective Comments    Subjective Comments Pt states just a little pain this morning but not bad at all. \"could be the weather\". States overall she feels great, has been very pleased with PT. States she feels comfortable being discharged today, knows what she needs to do to continue on her own. States she has ordered a physioball and a bench step  -KG         Subjective Pain    Able to rate subjective pain? yes  -KG      Pre-Treatment Pain Level 2  -KG      Post-Treatment Pain Level 0  -KG         Exercise 1    Exercise Name 1 Pro II BLE for endurance/strength; warm-up  -KG      Cueing 1 Verbal  -KG      Time 1 8 min  -KG         Exercise 2    Exercise Name 2 Standing otto hamstring stretch  -KG      Cueing 2 Verbal  -KG      Reps 2 2  -KG      Time 2 30\" hold  -KG         Exercise 3    Exercise Name 3 Seated figure 4 stretch  -KG      Reps 3 2  -KG      Time 3 30\" hold  -KG         Exercise 4    Exercise Name 4 Lumbar/BLE strength & ROM measurements  -KG         Exercise 5    Exercise Name 5 Seated physiobal PN/TA alt LAQ  -KG      Cueing 5 Verbal  -KG      Sets 5 2  -KG      Reps 5 10  -KG         Exercise 6    Exercise Name 6 Seated physioball PN/TA alt " "marching  -KG      Cueing 6 Verbal  -KG      Sets 6 2  -KG      Reps 6 10  -KG         Exercise 7    Exercise Name 7 Seated physioball PN/TA tband mid rows  -KG      Cueing 7 Verbal  -KG      Sets 7 2  -KG      Reps 7 10  -KG      Additional Comments green tband  -KG         Exercise 8    Exercise Name 8 Multifidi step ups  -KG      Reps 8 Review for HEP  -KG         Exercise 9    Exercise Name 9 Lateral step up with opp hip abd SLR with TA  -KG      Reps 9 Review for HEP  -KG         Exercise 10    Exercise Name 10 Standing alt hip abd & ext SLR with TA  -KG      Cueing 10 Verbal  -KG      Sets 10 1  -KG      Reps 10 15 ea  -KG      Additional Comments red tband loop just below kness  -KG         Exercise 11    Exercise Name 11 Seated reverse chop on pball  -KG      Cueing 11 Verbal  -KG      Sets 11 1  -KG      Reps 11 5 ea for review  -KG         Exercise 12    Exercise Name 12 Reviewed standing tband chop/reverse chop  -KG         Exercise 13    Exercise Name 13 Lifting/ education with crate lift from 4\" step  -KG      Cueing 13 Verbal  -KG      Sets 13 1  -KG      Reps 13 5  -KG      Additional Comments empty wire crate  -KG         Exercise 14    Exercise Name 14 Review/demo proper ergonomics for sweeping, mopping, & vacuuming  -KG         Exercise 15    Exercise Name 15 HL piriformis stretch  -KG      Reps 15 1 ea  -KG      Time 15 30\"  -KG         Exercise 16    Exercise Name 16 Bridges/iso add with TA  -KG      Cueing 16 Verbal  -KG      Sets 16 1  -KG      Reps 16 10  -KG            User Key  (r) = Recorded By, (t) = Taken By, (c) = Cosigned By    Initials Name Provider Type    KG Milagros Zapata, PT Physical Therapist                           Manual Rx (last 36 hours)     Manual Treatments     Row Name 10/26/22 0800             Manual Rx 1    Manual Rx 1 Location Prone lumbar parapsinals/post hips  -KG      Manual Rx 1 Type STM/MFR  -KG      Manual Rx 1 Duration 5 min  -KG            User " Key  (r) = Recorded By, (t) = Taken By, (c) = Cosigned By    Initials Name Provider Type    BALWINDER Milagros Zapata, PT Physical Therapist                 PT OP Goals     Row Name 10/26/22 0800          PT Short Term Goals    STG 1 I with HEP and have additoins/changes by next recertification.  -KG     STG 1 Progress Met  -KG     STG 2 Patient to be more aware of posture and posture correction techniques.  -KG     STG 2 Progress Met  -KG     STG 3 Patient able to show proper log roll technique.  -KG     STG 3 Progress Met  -KG     STG 4 AROM lumbar SB WNL B.  -KG     STG 4 Progress Met  -KG     STG 5 AROM lumbar ROT WNL B.  -KG     STG 5 Progress Met  -KG        Long Term Goals    LTG 1 AROM for the lumbar spine all WNL, no increase in pain.  -KG     LTG 1 Progress Met  -KG     LTG 2 Patient able to tolerate 15 minutes of standing TA/core stab actvites with no increase in pain.  -KG     LTG 2 Progress Met  -KG     LTG 3 Patient able ot show proper lifting technique floor ot waist with no increas ein pain.  -KG     LTG 3 Progress Met  -KG     LTG 4 patient able to show proper ergonomics for mopping, sweeping, and vacuuming with no increase in pain.  -KG     LTG 4 Progress Met  -KG     LTG 5 I with ifnal HEP.  -KG     LTG 5 Progress Met  -KG        Time Calculation    PT Goal Re-Cert Due Date 11/16/22  -KG           User Key  (r) = Recorded By, (t) = Taken By, (c) = Cosigned By    Initials Name Provider Type    BALWINDER Milagros Zapata PT Physical Therapist                Therapy Education  Education Details: HEP was reviewed & update. See exercise flowsheet.  Given: HEP, Symptoms/condition management, Posture/body mechanics  Program: Reinforced, Progressed  How Provided: Verbal, Demonstration, Written  Provided to: Patient  Level of Understanding: Teach back education performed, Verbalized, Demonstrated    Outcome Measure Options: Modified Oswestry  Modified Oswestry  Modified Oswestry Score/Comments: 3 = 6%      Time  Calculation:   Start Time: 0801  Stop Time: 0850  Time Calculation (min): 49 min  Therapy Charges for Today     Code Description Service Date Service Provider Modifiers Qty    57930798285 HC PT THER PROC EA 15 MIN 10/26/2022 Milagros Zapata, PT GP 3          PT G-Codes  Outcome Measure Options: Modified Oswestry  Modified Oswestry Score/Comments: 3 = 6%            Milagros Zapata, PT  10/26/2022

## 2022-10-31 ENCOUNTER — APPOINTMENT (OUTPATIENT)
Dept: PHYSICAL THERAPY | Facility: HOSPITAL | Age: 57
End: 2022-10-31

## 2022-11-02 ENCOUNTER — APPOINTMENT (OUTPATIENT)
Dept: PHYSICAL THERAPY | Facility: HOSPITAL | Age: 57
End: 2022-11-02

## 2022-11-09 ENCOUNTER — FLU SHOT (OUTPATIENT)
Dept: FAMILY MEDICINE CLINIC | Facility: CLINIC | Age: 57
End: 2022-11-09

## 2022-11-09 DIAGNOSIS — Z23 NEED FOR INFLUENZA VACCINATION: Primary | ICD-10-CM

## 2022-11-09 PROCEDURE — 90471 IMMUNIZATION ADMIN: CPT | Performed by: NURSE PRACTITIONER

## 2022-11-09 PROCEDURE — 90686 IIV4 VACC NO PRSV 0.5 ML IM: CPT | Performed by: NURSE PRACTITIONER

## 2022-11-10 ENCOUNTER — IMMUNIZATION (OUTPATIENT)
Dept: FAMILY MEDICINE CLINIC | Facility: CLINIC | Age: 57
End: 2022-11-10

## 2022-11-10 DIAGNOSIS — Z23 NEED FOR VACCINATION: Primary | ICD-10-CM

## 2022-11-10 PROCEDURE — 91312 COVID-19 (PFIZER) BIVALENT BOOSTER 12+YRS: CPT | Performed by: NURSE PRACTITIONER

## 2022-11-10 PROCEDURE — 0124A COVID-19 (PFIZER) BIVALENT BOOSTER 12+YRS: CPT | Performed by: NURSE PRACTITIONER

## 2023-01-19 ENCOUNTER — OFFICE VISIT (OUTPATIENT)
Dept: FAMILY MEDICINE CLINIC | Facility: CLINIC | Age: 58
End: 2023-01-19
Payer: COMMERCIAL

## 2023-01-19 VITALS
DIASTOLIC BLOOD PRESSURE: 84 MMHG | OXYGEN SATURATION: 99 % | HEIGHT: 63 IN | SYSTOLIC BLOOD PRESSURE: 133 MMHG | BODY MASS INDEX: 47.06 KG/M2 | TEMPERATURE: 97.1 F | HEART RATE: 71 BPM | WEIGHT: 265.6 LBS

## 2023-01-19 DIAGNOSIS — F41.9 ANXIETY AND DEPRESSION: ICD-10-CM

## 2023-01-19 DIAGNOSIS — F32.A ANXIETY AND DEPRESSION: ICD-10-CM

## 2023-01-19 DIAGNOSIS — E66.01 CLASS 3 SEVERE OBESITY WITHOUT SERIOUS COMORBIDITY WITH BODY MASS INDEX (BMI) OF 40.0 TO 44.9 IN ADULT, UNSPECIFIED OBESITY TYPE: ICD-10-CM

## 2023-01-19 DIAGNOSIS — Z12.31 ENCOUNTER FOR SCREENING MAMMOGRAM FOR MALIGNANT NEOPLASM OF BREAST: ICD-10-CM

## 2023-01-19 DIAGNOSIS — E78.2 MIXED HYPERLIPIDEMIA: ICD-10-CM

## 2023-01-19 DIAGNOSIS — R53.83 FATIGUE, UNSPECIFIED TYPE: ICD-10-CM

## 2023-01-19 DIAGNOSIS — Z12.11 COLON CANCER SCREENING: ICD-10-CM

## 2023-01-19 DIAGNOSIS — M76.61 ACHILLES TENDINITIS OF RIGHT LOWER EXTREMITY: ICD-10-CM

## 2023-01-19 DIAGNOSIS — Z00.00 ANNUAL PHYSICAL EXAM: Primary | ICD-10-CM

## 2023-01-19 PROCEDURE — 81003 URINALYSIS AUTO W/O SCOPE: CPT | Performed by: NURSE PRACTITIONER

## 2023-01-19 PROCEDURE — 99396 PREV VISIT EST AGE 40-64: CPT | Performed by: NURSE PRACTITIONER

## 2023-01-19 NOTE — PROGRESS NOTES
CC: annual physical    History:  Alyssa Randhawa is a 57 y.o. female who presents today for evaluation of the above problems.      Patient reports that she is doing well other than continued weight gain and tendonitis in her right heel.  Weight was 248 in July and is 265 today. She does exercise daily on a recumbent bike, however, this is becoming more difficult due to her heel pain. She is due for a mammogram and cologuard.  BP is appropriate at 133/84.     HPI  ROS:  Review of Systems   Respiratory: Negative for shortness of breath.    Cardiovascular: Negative for chest pain.   Gastrointestinal: Negative for constipation and diarrhea.   Musculoskeletal: Positive for arthralgias.   Neurological: Negative for dizziness, light-headedness and headaches.       No Known Allergies  Past Medical History:   Diagnosis Date   • Anxiety 10 years   • Arthritis    • Bone spur     spine   • Ear infection    • Headache 2 months   • Heel spur    • Obesity Lifetime    Have lost 80 pounds since December 5 2018   • PONV (postoperative nausea and vomiting)    • Wears glasses      Past Surgical History:   Procedure Laterality Date   • CHOLECYSTECTOMY     • COLONOSCOPY  3 years    No issues   • ENDOMETRIAL ABLATION     • FRACTURE SURGERY  6 years?    Spiral break metal plate right lower leg   • KNEE MENISCAL REPAIR Left    • LEG SURGERY Right     spiral fracture    • TOTAL LAPAROSCOPIC HYSTERECTOMY SALPINGO OOPHORECTOMY Bilateral 12/6/2017    Procedure: TOTAL LAPAROSCOPIC HYSTERECTOMY BILATERAL SALPINGECTOMY WITH DAVINCI SI ROBOT, CYSTO;  Surgeon: Greg Mejía MD;  Location: [facility] OR;  Service:    • WISDOM TOOTH EXTRACTION       Family History   Problem Relation Age of Onset   • Breast cancer Sister    • Anxiety disorder Sister    • Asthma Sister    • Cancer Sister         Breast cancer   • Hypertension Sister    • Learning disabilities Sister         Dislexia   • Hypertension Father    • Alcohol abuse Father    • Arthritis Father   "  • Coronary artery disease Mother    • Hypertension Mother    • Diabetes Mother    • Anxiety disorder Mother    • Arthritis Mother    • No Known Problems Brother    • No Known Problems Daughter    • No Known Problems Son    • No Known Problems Maternal Grandmother    • No Known Problems Paternal Grandmother    • Breast cancer Maternal Aunt    • No Known Problems Paternal Aunt    • No Known Problems Other    • Breast cancer Maternal Cousin    • Breast cancer Maternal Cousin    • Breast cancer Maternal Cousin    • Anxiety disorder Brother    • Ovarian cancer Neg Hx    • Uterine cancer Neg Hx    • Colon cancer Neg Hx    • Melanoma Neg Hx    • BRCA 1/2 Neg Hx    • Endometrial cancer Neg Hx       reports that she quit smoking about 24 years ago. Her smoking use included cigarettes. She has a 3.75 pack-year smoking history. She has never used smokeless tobacco. She reports current alcohol use. She reports that she does not use drugs.      Current Outpatient Medications:   •  acetaminophen (TYLENOL) 650 MG 8 hr tablet, Take 1,300 mg by mouth Every Night., Disp: , Rfl:   •  atorvastatin (LIPITOR) 40 MG tablet, Take 1 tablet by mouth every night at bedtime., Disp: 90 tablet, Rfl: 3  •  meloxicam (MOBIC) 15 MG tablet, Take 1 tablet by mouth Daily., Disp: 90 tablet, Rfl: 2  •  Multiple Vitamins-Minerals (ONE-A-DAY 50 PLUS PO), , Disp: , Rfl:   •  NON FORMULARY, Take 500 mg by mouth Daily. Tumeric 500 mg daily, Disp: , Rfl:   •  sertraline (ZOLOFT) 100 MG tablet, Take 1 tablet by mouth Daily., Disp: 90 tablet, Rfl: 2  •  SUMAtriptan (Imitrex) 100 MG tablet, Take one tablet at onset of headache. May repeat dose one time in 2 hours if headache not relieved. No more than two tablets in 24 hours., Disp: 10 tablet, Rfl: 0    OBJECTIVE:  /84 (BP Location: Left arm, Patient Position: Sitting, Cuff Size: Adult)   Pulse 71   Temp 97.1 °F (36.2 °C) (Temporal)   Ht 160 cm (63\")   Wt 120 kg (265 lb 9.6 oz)   LMP 12/03/2017   " SpO2 99%   BMI 47.05 kg/m²    Physical Exam  Vitals reviewed.   Constitutional:       Appearance: She is well-developed.   HENT:      Right Ear: Tympanic membrane, ear canal and external ear normal.      Left Ear: Tympanic membrane, ear canal and external ear normal.      Mouth/Throat:      Mouth: Mucous membranes are moist.      Pharynx: Oropharynx is clear.   Neck:      Vascular: No carotid bruit.   Cardiovascular:      Rate and Rhythm: Normal rate and regular rhythm.      Heart sounds: Normal heart sounds.   Pulmonary:      Effort: Pulmonary effort is normal.      Breath sounds: Normal breath sounds.   Chest:   Breasts:     Right: Normal.      Left: Normal.   Abdominal:      General: Abdomen is flat. Bowel sounds are normal. There is no distension.      Palpations: Abdomen is soft.      Tenderness: There is no abdominal tenderness.   Neurological:      Mental Status: She is alert and oriented to person, place, and time.   Psychiatric:         Behavior: Behavior normal.         Assessment/Plan    Diagnoses and all orders for this visit:    1. Annual physical exam (Primary)  -     Mammo Screening Digital Tomosynthesis Bilateral With CAD; Future  -     CBC & Differential  -     TSH  -     T4, free  -     Lipid Panel  -     POC Urinalysis Dipstick, Multipro  -     Cologuard - Stool, Per Rectum; Future  -     Comprehensive Metabolic Panel    2. Anxiety and depression    3. Class 3 severe obesity without serious comorbidity with body mass index (BMI) of 40.0 to 44.9 in adult, unspecified obesity type (HCC)  -     Lipid Panel    4. Mixed hyperlipidemia  -     Lipid Panel    5. Encounter for screening mammogram for malignant neoplasm of breast  -     Mammo Screening Digital Tomosynthesis Bilateral With CAD; Future    6. Achilles tendinitis of right lower extremity  -     Ambulatory Referral to Orthopedic Surgery    7. Fatigue, unspecified type  -     CBC & Differential  -     TSH  -     T4, free  -     Comprehensive  Metabolic Panel    8. Colon cancer screening  -     Cologuard - Stool, Per Rectum; Future    HEALTH MAINTENANCE  Patient will schedule visit at [facility] for obesity mgmt. Cologuard ordered and mammogram ordered. To get shingles vax at pharmacy.     An After Visit Summary was printed and given to the patient at discharge.  Return in about 6 months (around 7/19/2023) for Next scheduled follow up.       Mickie Gonzales, CHERRIE 1/196/23    Electronically signed.

## 2023-01-20 LAB
ALBUMIN SERPL-MCNC: 4.3 G/DL (ref 3.5–5.2)
ALBUMIN/GLOB SERPL: 2 G/DL
ALP SERPL-CCNC: 106 U/L (ref 39–117)
ALT SERPL-CCNC: 25 U/L (ref 1–33)
AST SERPL-CCNC: 15 U/L (ref 1–32)
BASOPHILS # BLD AUTO: 0.03 10*3/MM3 (ref 0–0.2)
BASOPHILS NFR BLD AUTO: 0.5 % (ref 0–1.5)
BILIRUB SERPL-MCNC: 0.3 MG/DL (ref 0–1.2)
BUN SERPL-MCNC: 17 MG/DL (ref 6–20)
BUN/CREAT SERPL: 22.4 (ref 7–25)
CALCIUM SERPL-MCNC: 9.4 MG/DL (ref 8.6–10.5)
CHLORIDE SERPL-SCNC: 104 MMOL/L (ref 98–107)
CHOLEST SERPL-MCNC: 184 MG/DL (ref 0–200)
CO2 SERPL-SCNC: 28.5 MMOL/L (ref 22–29)
CREAT SERPL-MCNC: 0.76 MG/DL (ref 0.57–1)
EGFRCR SERPLBLD CKD-EPI 2021: 92.1 ML/MIN/1.73
EOSINOPHIL # BLD AUTO: 0.14 10*3/MM3 (ref 0–0.4)
EOSINOPHIL NFR BLD AUTO: 2.2 % (ref 0.3–6.2)
ERYTHROCYTE [DISTWIDTH] IN BLOOD BY AUTOMATED COUNT: 12.6 % (ref 12.3–15.4)
GLOBULIN SER CALC-MCNC: 2.2 GM/DL
GLUCOSE SERPL-MCNC: 93 MG/DL (ref 65–99)
HCT VFR BLD AUTO: 41.6 % (ref 34–46.6)
HDLC SERPL-MCNC: 58 MG/DL (ref 40–60)
HGB BLD-MCNC: 13.9 G/DL (ref 12–15.9)
IMM GRANULOCYTES # BLD AUTO: 0.02 10*3/MM3 (ref 0–0.05)
IMM GRANULOCYTES NFR BLD AUTO: 0.3 % (ref 0–0.5)
LDLC SERPL CALC-MCNC: 88 MG/DL (ref 0–100)
LYMPHOCYTES # BLD AUTO: 1.79 10*3/MM3 (ref 0.7–3.1)
LYMPHOCYTES NFR BLD AUTO: 28 % (ref 19.6–45.3)
MCH RBC QN AUTO: 29.2 PG (ref 26.6–33)
MCHC RBC AUTO-ENTMCNC: 33.4 G/DL (ref 31.5–35.7)
MCV RBC AUTO: 87.4 FL (ref 79–97)
MONOCYTES # BLD AUTO: 0.41 10*3/MM3 (ref 0.1–0.9)
MONOCYTES NFR BLD AUTO: 6.4 % (ref 5–12)
NEUTROPHILS # BLD AUTO: 4.01 10*3/MM3 (ref 1.7–7)
NEUTROPHILS NFR BLD AUTO: 62.6 % (ref 42.7–76)
NRBC BLD AUTO-RTO: 0 /100 WBC (ref 0–0.2)
PLATELET # BLD AUTO: 311 10*3/MM3 (ref 140–450)
POTASSIUM SERPL-SCNC: 4.6 MMOL/L (ref 3.5–5.2)
PROT SERPL-MCNC: 6.5 G/DL (ref 6–8.5)
RBC # BLD AUTO: 4.76 10*6/MM3 (ref 3.77–5.28)
SODIUM SERPL-SCNC: 139 MMOL/L (ref 136–145)
T4 FREE SERPL-MCNC: 1.04 NG/DL (ref 0.93–1.7)
TRIGL SERPL-MCNC: 231 MG/DL (ref 0–150)
TSH SERPL DL<=0.005 MIU/L-ACNC: 2.28 UIU/ML (ref 0.27–4.2)
VLDLC SERPL CALC-MCNC: 38 MG/DL (ref 5–40)
WBC # BLD AUTO: 6.4 10*3/MM3 (ref 3.4–10.8)

## 2023-02-02 ENCOUNTER — HOSPITAL ENCOUNTER (OUTPATIENT)
Dept: MAMMOGRAPHY | Facility: HOSPITAL | Age: 58
Discharge: HOME | End: 2023-02-02
Admitting: NURSE PRACTITIONER
Payer: COMMERCIAL

## 2023-02-02 DIAGNOSIS — Z00.00 ANNUAL PHYSICAL EXAM: ICD-10-CM

## 2023-02-02 DIAGNOSIS — Z12.31 ENCOUNTER FOR SCREENING MAMMOGRAM FOR MALIGNANT NEOPLASM OF BREAST: ICD-10-CM

## 2023-02-02 PROCEDURE — 77063 BREAST TOMOSYNTHESIS BI: CPT

## 2023-02-02 PROCEDURE — 77067 SCR MAMMO BI INCL CAD: CPT

## 2023-06-11 DIAGNOSIS — F41.9 ANXIETY: ICD-10-CM

## 2023-06-12 RX ORDER — SERTRALINE HYDROCHLORIDE 100 MG/1
TABLET, FILM COATED ORAL
Qty: 90 TABLET | Refills: 3 | Status: SHIPPED | OUTPATIENT
Start: 2023-06-12

## 2023-06-12 NOTE — TELEPHONE ENCOUNTER
Rx Refill Note  Requested Prescriptions     Pending Prescriptions Disp Refills    sertraline (ZOLOFT) 100 MG tablet [Pharmacy Med Name: SERTRALINE  MG TABLET] 90 tablet 2     Sig: TAKE 1 TABLET BY MOUTH EVERY DAY      Last office visit with prescribing clinician: 1/19/2023   Last telemedicine visit with prescribing clinician: Visit date not found   Next office visit with prescribing clinician: 7/19/2023   {Jennifer Tafoya MA  06/12/23, 08:03 CDT

## 2023-07-24 DIAGNOSIS — E78.2 MIXED HYPERLIPIDEMIA: ICD-10-CM

## 2023-07-24 RX ORDER — ATORVASTATIN CALCIUM 40 MG/1
TABLET, FILM COATED ORAL
Qty: 90 TABLET | Refills: 1 | Status: SHIPPED | OUTPATIENT
Start: 2023-07-24

## 2023-07-24 NOTE — TELEPHONE ENCOUNTER
Rx Refill Note  Requested Prescriptions     Pending Prescriptions Disp Refills    atorvastatin (LIPITOR) 40 MG tablet [Pharmacy Med Name: ATORVASTATIN 40 MG TABLET] 90 tablet 3     Sig: TAKE 1 TABLET BY MOUTH EVERYDAY AT BEDTIME      Last office visit with prescribing clinician: 7/19/2023   Last telemedicine visit with prescribing clinician: Visit date not found   Next office visit with prescribing clinician: 1/22/2024   CPE done 01/19/2023                      Would you like a call back once the refill request has been completed: [] Yes [] No    If the office needs to give you a call back, can they leave a voicemail: [] Yes [] No    Jennifer Hurtado MA  07/24/23, 12:24 CDT

## 2023-09-18 ENCOUNTER — OFFICE VISIT (OUTPATIENT)
Dept: FAMILY MEDICINE CLINIC | Facility: CLINIC | Age: 58
End: 2023-09-18
Payer: COMMERCIAL

## 2023-09-18 VITALS
HEART RATE: 76 BPM | WEIGHT: 268.6 LBS | HEIGHT: 63 IN | BODY MASS INDEX: 47.59 KG/M2 | OXYGEN SATURATION: 98 % | DIASTOLIC BLOOD PRESSURE: 77 MMHG | SYSTOLIC BLOOD PRESSURE: 120 MMHG | TEMPERATURE: 98 F

## 2023-09-18 DIAGNOSIS — M79.604 RIGHT LEG PAIN: ICD-10-CM

## 2023-09-18 DIAGNOSIS — M71.21 SYNOVIAL CYST OF RIGHT POPLITEAL SPACE: Primary | ICD-10-CM

## 2023-09-18 PROCEDURE — 99213 OFFICE O/P EST LOW 20 MIN: CPT | Performed by: NURSE PRACTITIONER

## 2023-09-18 PROCEDURE — 96372 THER/PROPH/DIAG INJ SC/IM: CPT | Performed by: NURSE PRACTITIONER

## 2023-09-18 RX ORDER — TRIAMCINOLONE ACETONIDE 40 MG/ML
40 INJECTION, SUSPENSION INTRA-ARTICULAR; INTRAMUSCULAR ONCE
Status: COMPLETED | OUTPATIENT
Start: 2023-09-18 | End: 2023-09-18

## 2023-09-18 RX ORDER — PREDNISONE 10 MG/1
TABLET ORAL
Qty: 21 TABLET | Refills: 0 | Status: SHIPPED | OUTPATIENT
Start: 2023-09-18

## 2023-09-18 RX ADMIN — TRIAMCINOLONE ACETONIDE 40 MG: 40 INJECTION, SUSPENSION INTRA-ARTICULAR; INTRAMUSCULAR at 14:25

## 2023-09-18 NOTE — PROGRESS NOTES
CC: right knee pain    History:  Alyssa Randhawa is a 58 y.o. female who presents today for evaluation of the above problems.      Patient complains of right knee pain for the past week. Will also have occasional numbness in the right leg. The pain is worse with walking.     HPI  ROS:  Review of Systems   Musculoskeletal:         Right posterior knee pain     No Known Allergies  Past Medical History:   Diagnosis Date    Anxiety 10 years    Arthritis     Bone spur     spine    Ear infection     Headache 2 months    Heel spur     Obesity Lifetime    Have lost 80 pounds since December 5 2018    PONV (postoperative nausea and vomiting)     Wears glasses      Past Surgical History:   Procedure Laterality Date    CHOLECYSTECTOMY      COLONOSCOPY  3 years    No issues    ENDOMETRIAL ABLATION      FRACTURE SURGERY  6 years?    Spiral break metal plate right lower leg    KNEE MENISCAL REPAIR Left     LEG SURGERY Right     spiral fracture     TOTAL LAPAROSCOPIC HYSTERECTOMY SALPINGO OOPHORECTOMY Bilateral 12/06/2017    Procedure: TOTAL LAPAROSCOPIC HYSTERECTOMY BILATERAL SALPINGECTOMY WITH DAVINCI SI ROBOT, CYSTO;  Surgeon: Greg Mejía MD;  Location: [facility] OR;  Service:     WISDOM TOOTH EXTRACTION       Family History   Problem Relation Age of Onset    Breast cancer Sister     Anxiety disorder Sister     Asthma Sister     Cancer Sister         Breast cancer    Hypertension Sister     Learning disabilities Sister         Dislexia    Hypertension Father     Alcohol abuse Father     Arthritis Father     Coronary artery disease Mother     Hypertension Mother     Diabetes Mother     Anxiety disorder Mother     Arthritis Mother     No Known Problems Brother     No Known Problems Daughter     No Known Problems Son     No Known Problems Maternal Grandmother     No Known Problems Paternal Grandmother     Breast cancer Maternal Aunt     No Known Problems Paternal Aunt     No Known Problems Other     Breast cancer Maternal Cousin   "   Breast cancer Maternal Cousin     Breast cancer Maternal Cousin     Anxiety disorder Brother     Ovarian cancer Neg Hx     Uterine cancer Neg Hx     Colon cancer Neg Hx     Melanoma Neg Hx     BRCA 1/2 Neg Hx     Endometrial cancer Neg Hx       reports that she quit smoking about 24 years ago. Her smoking use included cigarettes. She has a 3.75 pack-year smoking history. She has never used smokeless tobacco. She reports current alcohol use. She reports that she does not use drugs.      Current Outpatient Medications:     atorvastatin (LIPITOR) 40 MG tablet, TAKE 1 TABLET BY MOUTH EVERYDAY AT BEDTIME, Disp: 90 tablet, Rfl: 1    meloxicam (MOBIC) 15 MG tablet, TAKE 1 TABLET BY MOUTH EVERY DAY, Disp: 90 tablet, Rfl: 2    Multiple Vitamins-Minerals (ONE-A-DAY 50 PLUS PO), , Disp: , Rfl:     NON FORMULARY, Take 500 mg by mouth Daily. Tumeric 500 mg daily, Disp: , Rfl:     sertraline (ZOLOFT) 100 MG tablet, TAKE 1 TABLET BY MOUTH EVERY DAY, Disp: 90 tablet, Rfl: 3    SUMAtriptan (Imitrex) 100 MG tablet, Take one tablet at onset of headache. May repeat dose one time in 2 hours if headache not relieved. No more than two tablets in 24 hours., Disp: 10 tablet, Rfl: 0    predniSONE (DELTASONE) 10 MG (21) dose pack, Use as directed on package, Disp: 21 tablet, Rfl: 0    Current Facility-Administered Medications:     triamcinolone acetonide (KENALOG-40) injection 40 mg, 40 mg, Intramuscular, Once, Mickie Gonzalse, APRN    OBJECTIVE:  /77 (BP Location: Left arm, Patient Position: Sitting, Cuff Size: Large Adult)   Pulse 76   Temp 98 °F (36.7 °C) (Temporal)   Ht 160 cm (63\")   Wt 122 kg (268 lb 9.6 oz)   LMP 12/03/2017   SpO2 98%   BMI 47.58 kg/m²    Physical Exam  Vitals reviewed.   Constitutional:       Appearance: She is well-developed.   Cardiovascular:      Rate and Rhythm: Normal rate.   Pulmonary:      Effort: Pulmonary effort is normal.   Musculoskeletal:      Right knee: Tenderness (popliteal) present. "   Neurological:      Mental Status: She is alert and oriented to person, place, and time.   Psychiatric:         Behavior: Behavior normal.       Assessment/Plan    Diagnoses and all orders for this visit:    1. Synovial cyst of right popliteal space (Primary)  -     triamcinolone acetonide (KENALOG-40) injection 40 mg  -     predniSONE (DELTASONE) 10 MG (21) dose pack; Use as directed on package  Dispense: 21 tablet; Refill: 0    2. Right leg pain  -     US Venous Doppler Lower Extremity Right (duplex); Future    Will evaluate to make sure it isn't DVT, however, appears to be Baker's cyst. Will treat with steroids. Patient to notify clinic if no improvement.     An After Visit Summary was printed and given to the patient at discharge.  Return if symptoms worsen or fail to improve, for Next scheduled follow up.       CHERRIE Emerson 9/18/23    Electronically signed.

## 2023-10-06 ENCOUNTER — TELEPHONE (OUTPATIENT)
Dept: FAMILY MEDICINE CLINIC | Facility: CLINIC | Age: 58
End: 2023-10-06
Payer: COMMERCIAL

## 2023-10-06 NOTE — TELEPHONE ENCOUNTER
Caller: Alyssa Randhawa    Relationship: Self    Best call back number: [phone]    What test was performed: US ON VEIN    When was the test performed: 09.27.2023    Where was the test performed: [facility]

## 2023-10-09 DIAGNOSIS — M71.21 SYNOVIAL CYST OF RIGHT POPLITEAL SPACE: Primary | ICD-10-CM

## 2023-12-09 DIAGNOSIS — E78.2 MIXED HYPERLIPIDEMIA: ICD-10-CM

## 2023-12-11 RX ORDER — ATORVASTATIN CALCIUM 40 MG/1
TABLET, FILM COATED ORAL
Qty: 90 TABLET | Refills: 3 | Status: SHIPPED | OUTPATIENT
Start: 2023-12-11

## 2023-12-11 NOTE — TELEPHONE ENCOUNTER
Rx Refill Note  Requested Prescriptions     Pending Prescriptions Disp Refills    atorvastatin (LIPITOR) 40 MG tablet [Pharmacy Med Name: ATORVASTATIN 40 MG TABLET] 90 tablet 1     Sig: TAKE 1 TABLET BY MOUTH EVERYDAY AT BEDTIME      Last office visit with prescribing clinician: 9/18/23   Last telemedicine visit with prescribing clinician: Visit date not found   Next office visit with prescribing clinician: 1/22/24    {TIP  Please add Last Relevant Lab 7/19/23    Jennifer Tafoya MA  12/11/23, 07:36 CST

## 2024-01-22 ENCOUNTER — OFFICE VISIT (OUTPATIENT)
Dept: FAMILY MEDICINE CLINIC | Facility: CLINIC | Age: 59
End: 2024-01-22
Payer: COMMERCIAL

## 2024-01-22 VITALS
BODY MASS INDEX: 42.28 KG/M2 | HEART RATE: 65 BPM | WEIGHT: 238.6 LBS | OXYGEN SATURATION: 95 % | DIASTOLIC BLOOD PRESSURE: 81 MMHG | HEIGHT: 63 IN | SYSTOLIC BLOOD PRESSURE: 121 MMHG | TEMPERATURE: 97.7 F

## 2024-01-22 DIAGNOSIS — Z12.31 ENCOUNTER FOR SCREENING MAMMOGRAM FOR MALIGNANT NEOPLASM OF BREAST: ICD-10-CM

## 2024-01-22 DIAGNOSIS — Z98.84 H/O LAPAROSCOPIC ADJUSTABLE GASTRIC BANDING: ICD-10-CM

## 2024-01-22 DIAGNOSIS — F32.A ANXIETY AND DEPRESSION: ICD-10-CM

## 2024-01-22 DIAGNOSIS — Z00.00 ANNUAL PHYSICAL EXAM: Primary | ICD-10-CM

## 2024-01-22 DIAGNOSIS — E78.2 MIXED HYPERLIPIDEMIA: ICD-10-CM

## 2024-01-22 DIAGNOSIS — R53.83 FATIGUE, UNSPECIFIED TYPE: ICD-10-CM

## 2024-01-22 DIAGNOSIS — F41.9 ANXIETY AND DEPRESSION: ICD-10-CM

## 2024-01-22 DIAGNOSIS — E66.01 MORBID OBESITY: ICD-10-CM

## 2024-01-22 PROCEDURE — 81003 URINALYSIS AUTO W/O SCOPE: CPT | Performed by: NURSE PRACTITIONER

## 2024-01-22 PROCEDURE — 99396 PREV VISIT EST AGE 40-64: CPT | Performed by: NURSE PRACTITIONER

## 2024-01-22 NOTE — PROGRESS NOTES
CC: annual physical     History:  Alyssa Randhawa is a 59 y.o. female who presents today for evaluation of the above problems.      Patient reports that she is doing well. She had lap band surgery about a month ago. Weight is down 30 pounds since September. Does have some constipation due to dietary changes. She denies any issues with chest pain or shortness of breath. Has had to start going to PT for her achilles tendonitis again. Colon screening is up to date. Is due for mammogram.       HPI  ROS:  Review of Systems   Constitutional:  Negative for fatigue.   Respiratory:  Negative for shortness of breath.    Cardiovascular:  Negative for chest pain.   Gastrointestinal:  Positive for constipation. Negative for diarrhea.   Neurological:  Negative for dizziness, light-headedness and numbness.       No Known Allergies  Past Medical History:   Diagnosis Date    Anxiety 10 years    Arthritis     Bone spur     spine    Ear infection     Headache 2 months    Heel spur     Obesity Lifetime    Have lost 80 pounds since December 5 2018    PONV (postoperative nausea and vomiting)     Wears glasses      Past Surgical History:   Procedure Laterality Date    BARIATRIC SURGERY  12/22/23    Lap Band. [facility]    CHOLECYSTECTOMY      COLONOSCOPY  3 years    No issues    ENDOMETRIAL ABLATION      FRACTURE SURGERY  6 years?    Spiral break metal plate right lower leg    KNEE MENISCAL REPAIR Left     LAPAROSCOPIC GASTRIC BANDING  12/22/2023    Dr. Bingham    LEG SURGERY Right     spiral fracture     TOTAL LAPAROSCOPIC HYSTERECTOMY SALPINGO OOPHORECTOMY Bilateral 12/06/2017    Procedure: TOTAL LAPAROSCOPIC HYSTERECTOMY BILATERAL SALPINGECTOMY WITH DAVINCI SI ROBOT, CYSTO;  Surgeon: Greg Mejía MD;  Location: [facility] OR;  Service:     WISDOM TOOTH EXTRACTION       Family History   Problem Relation Age of Onset    Breast cancer Sister     Anxiety disorder Sister     Asthma Sister     Cancer Sister         Breast cancer     Hypertension Sister     Learning disabilities Sister         Dislexia    Hypertension Father     Alcohol abuse Father     Arthritis Father     Coronary artery disease Mother     Hypertension Mother     Diabetes Mother     Anxiety disorder Mother     Arthritis Mother     No Known Problems Brother     No Known Problems Daughter     No Known Problems Son     No Known Problems Maternal Grandmother     No Known Problems Paternal Grandmother     Breast cancer Maternal Aunt     No Known Problems Paternal Aunt     No Known Problems Other     Breast cancer Maternal Cousin     Breast cancer Maternal Cousin     Breast cancer Maternal Cousin     Anxiety disorder Brother     Ovarian cancer Neg Hx     Uterine cancer Neg Hx     Colon cancer Neg Hx     Melanoma Neg Hx     BRCA 1/2 Neg Hx     Endometrial cancer Neg Hx       reports that she quit smoking about 25 years ago. Her smoking use included cigarettes. She has a 3.75 pack-year smoking history. She has been exposed to tobacco smoke. She has never used smokeless tobacco. She reports current alcohol use. She reports that she does not use drugs.      Current Outpatient Medications:     atorvastatin (LIPITOR) 40 MG tablet, TAKE 1 TABLET BY MOUTH EVERYDAY AT BEDTIME, Disp: 90 tablet, Rfl: 3    meloxicam (MOBIC) 15 MG tablet, TAKE 1 TABLET BY MOUTH EVERY DAY, Disp: 90 tablet, Rfl: 2    Multiple Vitamins-Minerals (ONE-A-DAY 50 PLUS PO), , Disp: , Rfl:     NON FORMULARY, Take 500 mg by mouth Daily. Tumeric 500 mg daily, Disp: , Rfl:     sertraline (ZOLOFT) 100 MG tablet, TAKE 1 TABLET BY MOUTH EVERY DAY, Disp: 90 tablet, Rfl: 3    SUMAtriptan (Imitrex) 100 MG tablet, Take one tablet at onset of headache. May repeat dose one time in 2 hours if headache not relieved. No more than two tablets in 24 hours., Disp: 10 tablet, Rfl: 0    OBJECTIVE:  /81 (BP Location: Left arm, Patient Position: Sitting, Cuff Size: Large Adult)   Pulse 65   Temp 97.7 °F (36.5 °C) (Temporal)   Ht 160  "cm (63\")   Wt 108 kg (238 lb 9.6 oz)   LMP 12/03/2017   SpO2 95%   BMI 42.27 kg/m²    Physical Exam  Vitals reviewed.   Constitutional:       Appearance: She is well-developed.   HENT:      Right Ear: Tympanic membrane, ear canal and external ear normal.      Left Ear: Tympanic membrane, ear canal and external ear normal.      Mouth/Throat:      Mouth: Mucous membranes are moist.      Pharynx: Oropharynx is clear.   Neck:      Vascular: No carotid bruit.   Cardiovascular:      Rate and Rhythm: Normal rate and regular rhythm.   Pulmonary:      Effort: Pulmonary effort is normal.   Chest:   Breasts:     Right: Normal.      Left: Normal.   Abdominal:      General: Bowel sounds are normal. There is no distension.      Palpations: Abdomen is soft.      Tenderness: There is no abdominal tenderness.   Genitourinary:     Comments: Deferred - hysterectomy  Neurological:      Mental Status: She is alert and oriented to person, place, and time.   Psychiatric:         Behavior: Behavior normal.       Assessment/Plan    Diagnoses and all orders for this visit:    1. Annual physical exam (Primary)  -     Vitamin A  -     Vitamin E  -     Vitamin B12  -     Iron  -     Calcium  -     Folate  -     Vitamin B1, Whole Blood  -     CBC & Differential  -     TSH  -     T4, free  -     Comprehensive Metabolic Panel  -     Lipid Panel  -     POC Urinalysis Dipstick, Multipro    2. Morbid obesity  -     Vitamin A  -     Vitamin E  -     Vitamin B12  -     Iron  -     Calcium  -     Folate  -     Vitamin B1, Whole Blood    3. Anxiety and depression  -     CBC & Differential  -     TSH  -     T4, free  -     Comprehensive Metabolic Panel    4. Mixed hyperlipidemia  -     Lipid Panel    5. Fatigue, unspecified type  -     Vitamin B12  -     Iron  -     CBC & Differential  -     TSH  -     T4, free  -     Comprehensive Metabolic Panel  -     POC Urinalysis Dipstick, Multipro    6. H/O laparoscopic adjustable gastric banding  -     Vitamin " A  -     Vitamin E  -     Vitamin B12  -     Iron  -     Calcium  -     Folate  -     Vitamin B1, Whole Blood    7. Encounter for screening mammogram for malignant neoplasm of breast  -     Cancel: Mammo Screening Digital Tomosynthesis Bilateral With CAD; Future  -     Mammo Screening Digital Tomosynthesis Bilateral With CAD; Future    Class 3 Severe Obesity (BMI >=40). Obesity-related health conditions include the following: dyslipidemias. Obesity is improving with treatment. BMI is is above average; BMI management plan is completed. We discussed  continuing to follow with Bariatrics .     HEALTH MAINTENANCE  Lab today. Mammogram ordered. Has already had flu shot. Continue with bariatrics for obesity mgmt.     An After Visit Summary was printed and given to the patient at discharge.  Return in about 6 months (around 7/22/2024) for Next scheduled follow up.       CHERRIE Emerson 1/22/24    Electronically signed.

## 2024-01-25 LAB
A-TOCOPHEROL VIT E SERPL-MCNC: 11 MG/L (ref 7–25.1)
ALBUMIN SERPL-MCNC: 4.7 G/DL (ref 3.8–4.9)
ALBUMIN/GLOB SERPL: 2.4 {RATIO} (ref 1.2–2.2)
ALP SERPL-CCNC: 126 IU/L (ref 44–121)
ALT SERPL-CCNC: 27 IU/L (ref 0–32)
AST SERPL-CCNC: 16 IU/L (ref 0–40)
BASOPHILS # BLD AUTO: 0 X10E3/UL (ref 0–0.2)
BASOPHILS NFR BLD AUTO: 0 %
BILIRUB SERPL-MCNC: 0.4 MG/DL (ref 0–1.2)
BUN SERPL-MCNC: 19 MG/DL (ref 6–24)
BUN/CREAT SERPL: 23 (ref 9–23)
CALCIUM SERPL-MCNC: 9.4 MG/DL (ref 8.7–10.2)
CHLORIDE SERPL-SCNC: 105 MMOL/L (ref 96–106)
CHOLEST SERPL-MCNC: 175 MG/DL (ref 100–199)
CO2 SERPL-SCNC: 24 MMOL/L (ref 20–29)
CREAT SERPL-MCNC: 0.84 MG/DL (ref 0.57–1)
EGFRCR SERPLBLD CKD-EPI 2021: 80 ML/MIN/1.73
EOSINOPHIL # BLD AUTO: 0.1 X10E3/UL (ref 0–0.4)
EOSINOPHIL NFR BLD AUTO: 2 %
ERYTHROCYTE [DISTWIDTH] IN BLOOD BY AUTOMATED COUNT: 13.3 % (ref 11.7–15.4)
FOLATE SERPL-MCNC: 13.1 NG/ML
GAMMA TOCOPHEROL SERPL-MCNC: 1 MG/L (ref 0.5–5.5)
GLOBULIN SER CALC-MCNC: 2 G/DL (ref 1.5–4.5)
GLUCOSE SERPL-MCNC: 94 MG/DL (ref 70–99)
HCT VFR BLD AUTO: 44.1 % (ref 34–46.6)
HDLC SERPL-MCNC: 61 MG/DL
HGB BLD-MCNC: 14.6 G/DL (ref 11.1–15.9)
IMM GRANULOCYTES # BLD AUTO: 0 X10E3/UL (ref 0–0.1)
IMM GRANULOCYTES NFR BLD AUTO: 0 %
IRON SERPL-MCNC: 105 UG/DL (ref 27–159)
LDLC SERPL CALC-MCNC: 93 MG/DL (ref 0–99)
LYMPHOCYTES # BLD AUTO: 1.9 X10E3/UL (ref 0.7–3.1)
LYMPHOCYTES NFR BLD AUTO: 27 %
MCH RBC QN AUTO: 28.7 PG (ref 26.6–33)
MCHC RBC AUTO-ENTMCNC: 33.1 G/DL (ref 31.5–35.7)
MCV RBC AUTO: 87 FL (ref 79–97)
MONOCYTES # BLD AUTO: 0.4 X10E3/UL (ref 0.1–0.9)
MONOCYTES NFR BLD AUTO: 6 %
NEUTROPHILS # BLD AUTO: 4.5 X10E3/UL (ref 1.4–7)
NEUTROPHILS NFR BLD AUTO: 65 %
PLATELET # BLD AUTO: 321 X10E3/UL (ref 150–450)
POTASSIUM SERPL-SCNC: 4.3 MMOL/L (ref 3.5–5.2)
PROT SERPL-MCNC: 6.7 G/DL (ref 6–8.5)
RBC # BLD AUTO: 5.09 X10E6/UL (ref 3.77–5.28)
SODIUM SERPL-SCNC: 144 MMOL/L (ref 134–144)
T4 FREE SERPL-MCNC: 1.16 NG/DL (ref 0.82–1.77)
TRIGL SERPL-MCNC: 117 MG/DL (ref 0–149)
TSH SERPL DL<=0.005 MIU/L-ACNC: 2.51 UIU/ML (ref 0.45–4.5)
VIT A SERPL-MCNC: 74.8 UG/DL (ref 20.1–62)
VIT B1 BLD-SCNC: 193 NMOL/L (ref 66.5–200)
VIT B12 SERPL-MCNC: 483 PG/ML (ref 232–1245)
VLDLC SERPL CALC-MCNC: 21 MG/DL (ref 5–40)
WBC # BLD AUTO: 6.9 X10E3/UL (ref 3.4–10.8)

## 2024-01-27 LAB
NCCN CRITERIA FLAG: ABNORMAL
TYRER CUZICK SCORE: 13.7

## 2024-01-30 DIAGNOSIS — Z13.79 GENETIC TESTING: Primary | ICD-10-CM

## 2024-02-06 ENCOUNTER — HOSPITAL ENCOUNTER (OUTPATIENT)
Dept: MAMMOGRAPHY | Facility: HOSPITAL | Age: 59
Discharge: HOME | End: 2024-02-06
Payer: COMMERCIAL

## 2024-02-06 ENCOUNTER — LAB (OUTPATIENT)
Dept: LAB | Facility: HOSPITAL | Age: 59
End: 2024-02-06
Payer: COMMERCIAL

## 2024-02-06 DIAGNOSIS — Z12.31 ENCOUNTER FOR SCREENING MAMMOGRAM FOR MALIGNANT NEOPLASM OF BREAST: ICD-10-CM

## 2024-02-06 PROCEDURE — 77063 BREAST TOMOSYNTHESIS BI: CPT

## 2024-02-06 PROCEDURE — 77067 SCR MAMMO BI INCL CAD: CPT

## 2024-02-18 DIAGNOSIS — F41.9 ANXIETY: ICD-10-CM

## 2024-02-19 RX ORDER — SERTRALINE HYDROCHLORIDE 100 MG/1
TABLET, FILM COATED ORAL
Qty: 90 TABLET | Refills: 3 | Status: SHIPPED | OUTPATIENT
Start: 2024-02-19

## 2024-02-19 NOTE — TELEPHONE ENCOUNTER
Rx Refill Note  Requested Prescriptions     Pending Prescriptions Disp Refills    sertraline (ZOLOFT) 100 MG tablet [Pharmacy Med Name: SERTRALINE  MG TABLET] 90 tablet 3     Sig: TAKE 1 TABLET BY MOUTH EVERY DAY      Last office visit with prescribing clinician: 1/22/2024   Last telemedicine visit with prescribing clinician: Visit date not found   Next office visit with prescribing clinician: 7/22/2024       Jennifer Tafoya MA  02/19/24, 07:39 CST

## 2024-06-21 DIAGNOSIS — M77.9 INFLAMMATION AROUND JOINT: Primary | ICD-10-CM

## 2024-06-21 RX ORDER — MELOXICAM 15 MG/1
15 TABLET ORAL DAILY
Qty: 90 TABLET | Refills: 2 | Status: SHIPPED | OUTPATIENT
Start: 2024-06-21

## 2024-06-21 NOTE — TELEPHONE ENCOUNTER
Rx Refill Note  Requested Prescriptions     Pending Prescriptions Disp Refills    meloxicam (MOBIC) 15 MG tablet 90 tablet 2     Sig: Take 1 tablet by mouth Daily.      Last office visit with prescribing clinician: 1/22/2024   Last telemedicine visit with prescribing clinician: Visit date not found   Next office visit with prescribing clinician: 7/22/2024   CPE done 1/22/2024    {TIP  Please add Last Relevant Lab Date if appropriate: 01/22/2024             {TIP  Is Refill Pharmacy correct?:Yes     Would you like a call back once the refill request has been completed: [] Yes [x] No    If the office needs to give you a call back, can they leave a voicemail: [] Yes [] No    Jennifer Hurtado MA  06/21/24, 10:28 CDT

## 2024-07-22 ENCOUNTER — OFFICE VISIT (OUTPATIENT)
Dept: FAMILY MEDICINE CLINIC | Facility: CLINIC | Age: 59
End: 2024-07-22
Payer: COMMERCIAL

## 2024-07-22 VITALS
DIASTOLIC BLOOD PRESSURE: 74 MMHG | HEIGHT: 63 IN | HEART RATE: 77 BPM | TEMPERATURE: 97.9 F | SYSTOLIC BLOOD PRESSURE: 117 MMHG | BODY MASS INDEX: 43.52 KG/M2 | OXYGEN SATURATION: 96 % | WEIGHT: 245.6 LBS

## 2024-07-22 DIAGNOSIS — F32.A ANXIETY AND DEPRESSION: ICD-10-CM

## 2024-07-22 DIAGNOSIS — E78.2 MIXED HYPERLIPIDEMIA: Primary | ICD-10-CM

## 2024-07-22 DIAGNOSIS — E66.01 CLASS 3 SEVERE OBESITY WITHOUT SERIOUS COMORBIDITY WITH BODY MASS INDEX (BMI) OF 40.0 TO 44.9 IN ADULT, UNSPECIFIED OBESITY TYPE: ICD-10-CM

## 2024-07-22 DIAGNOSIS — M19.90 ARTHRITIS: ICD-10-CM

## 2024-07-22 DIAGNOSIS — F41.9 ANXIETY AND DEPRESSION: ICD-10-CM

## 2024-07-22 PROCEDURE — 99214 OFFICE O/P EST MOD 30 MIN: CPT | Performed by: NURSE PRACTITIONER

## 2024-07-22 PROCEDURE — 96372 THER/PROPH/DIAG INJ SC/IM: CPT | Performed by: NURSE PRACTITIONER

## 2024-07-22 RX ORDER — PHENTERMINE HYDROCHLORIDE 37.5 MG/1
1 TABLET ORAL DAILY
COMMUNITY
Start: 2024-07-17 | End: 2024-08-16

## 2024-07-22 RX ORDER — TRIAMCINOLONE ACETONIDE 40 MG/ML
40 INJECTION, SUSPENSION INTRA-ARTICULAR; INTRAMUSCULAR ONCE
Status: COMPLETED | OUTPATIENT
Start: 2024-07-22 | End: 2024-07-22

## 2024-07-22 RX ADMIN — TRIAMCINOLONE ACETONIDE 40 MG: 40 INJECTION, SUSPENSION INTRA-ARTICULAR; INTRAMUSCULAR at 08:21

## 2024-07-22 NOTE — PROGRESS NOTES
CC: 6 month check - hyperlipidemia, obesity    History:  Alyssa Randhawa is a 59 y.o. female who presents today for evaluation of the above problems.      Patient presents for six month check. Denies any issues other than arthritis pain in knuckles of right hand. 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 She is right hand dominant.   BP is appropriate. BMI is 43.5 today.       HPI  ROS:  Review of Systems   Respiratory:  Negative for shortness of breath.    Cardiovascular:  Negative for chest pain.   Musculoskeletal:  Positive for arthralgias.       No Known Allergies  Past Medical History:   Diagnosis Date    Anxiety 10 years    Arthritis     Bone spur     spine    Ear infection     Headache 2 months    Heel spur     Obesity Lifetime    Have lost 80 pounds since December 5 2018    PONV (postoperative nausea and vomiting)     Wears glasses      Past Surgical History:   Procedure Laterality Date    BARIATRIC SURGERY  12/22/23    Lap Band. [facility]    CHOLECYSTECTOMY      COLONOSCOPY  3 years    No issues    ENDOMETRIAL ABLATION      FRACTURE SURGERY  6 years?    Spiral break metal plate right lower leg    KNEE MENISCAL REPAIR Left     LAPAROSCOPIC GASTRIC BANDING  12/22/2023    Dr. Bingham    LEG SURGERY Right     spiral fracture     TOTAL LAPAROSCOPIC HYSTERECTOMY SALPINGO OOPHORECTOMY Bilateral 12/06/2017    Procedure: TOTAL LAPAROSCOPIC HYSTERECTOMY BILATERAL SALPINGECTOMY WITH DAVINCI SI ROBOT, CYSTO;  Surgeon: Greg Mejía MD;  Location: [facility] OR;  Service:     WISDOM TOOTH EXTRACTION       Family History   Problem Relation Age of Onset    Coronary artery disease Mother     Hypertension Mother     Diabetes Mother     Anxiety disorder Mother     Arthritis Mother     Hypertension Father     Alcohol abuse Father     Arthritis Father     Breast cancer Sister 42    Anxiety disorder Sister     Asthma Sister     Cancer Sister         Breast cancer    Hypertension Sister     Learning  "disabilities Sister         Dislexia    No Known Problems Brother     Anxiety disorder Brother     No Known Problems Daughter     No Known Problems Son     No Known Problems Maternal Grandmother     No Known Problems Paternal Grandmother     Breast cancer Maternal Aunt     No Known Problems Paternal Aunt     Breast cancer Maternal Cousin     Breast cancer Maternal Cousin     Breast cancer Maternal Cousin     No Known Problems Other     Ovarian cancer Neg Hx     Uterine cancer Neg Hx     Colon cancer Neg Hx     Melanoma Neg Hx     BRCA 1/2 Neg Hx     Endometrial cancer Neg Hx       reports that she quit smoking about 25 years ago. Her smoking use included cigarettes. She started smoking about 40 years ago. She has a 3.8 pack-year smoking history. She has been exposed to tobacco smoke. She has never used smokeless tobacco. She reports current alcohol use. She reports that she does not use drugs.      Current Outpatient Medications:     atorvastatin (LIPITOR) 40 MG tablet, TAKE 1 TABLET BY MOUTH EVERYDAY AT BEDTIME, Disp: 90 tablet, Rfl: 3    meloxicam (MOBIC) 15 MG tablet, Take 1 tablet by mouth Daily., Disp: 90 tablet, Rfl: 2    Multiple Vitamins-Minerals (ONE-A-DAY 50 PLUS PO), , Disp: , Rfl:     NON FORMULARY, Take 500 mg by mouth Daily. Tumeric 500 mg daily, Disp: , Rfl:     phentermine (ADIPEX-P) 37.5 MG tablet, Take 1 tablet by mouth Daily., Disp: , Rfl:     sertraline (ZOLOFT) 100 MG tablet, TAKE 1 TABLET BY MOUTH EVERY DAY, Disp: 90 tablet, Rfl: 3    SUMAtriptan (Imitrex) 100 MG tablet, Take one tablet at onset of headache. May repeat dose one time in 2 hours if headache not relieved. No more than two tablets in 24 hours., Disp: 10 tablet, Rfl: 0  No current facility-administered medications for this visit.    OBJECTIVE:  /74 (BP Location: Right arm, Patient Position: Sitting, Cuff Size: Large Adult)   Pulse 77   Temp 97.9 °F (36.6 °C) (Temporal)   Ht 160 cm (63\")   Wt 111 kg (245 lb 9.6 oz)   LMP " 12/03/2017   SpO2 96%   BMI 43.51 kg/m²    Physical Exam  Vitals reviewed.   Constitutional:       Appearance: She is well-developed.   Cardiovascular:      Rate and Rhythm: Normal rate and regular rhythm.      Heart sounds: Normal heart sounds.   Pulmonary:      Effort: Pulmonary effort is normal.      Breath sounds: Normal breath sounds.   Musculoskeletal:      Comments: No synovitis noted in right hand   Neurological:      Mental Status: She is alert and oriented to person, place, and time.   Psychiatric:         Behavior: Behavior normal.         Assessment/Plan    Diagnoses and all orders for this visit:    1. Mixed hyperlipidemia (Primary)  -     Lipid Panel    2. Anxiety and depression    3. Arthritis  -     triamcinolone acetonide (KENALOG-40) injection 40 mg    4. Class 3 severe obesity without serious comorbidity with body mass index (BMI) of 40.0 to 44.9 in adult, unspecified obesity type  -     Comprehensive Metabolic Panel    Advised that she can take tylenol in addition to mobic for arthritis pain. Advised that some people benefit from supportive gloves when performing repetitive motions with hands to provide support and help with arthralgias.     An After Visit Summary was printed and given to the patient at discharge.  Return in about 6 months (around 1/22/2025) for Annual physical.       Mickie Gonzales, CHERRIE 7/22/24    Electronically signed.

## 2024-07-23 LAB
ALBUMIN SERPL-MCNC: 4.6 G/DL (ref 3.5–5.2)
ALBUMIN/GLOB SERPL: 2.2 G/DL
ALP SERPL-CCNC: 129 U/L (ref 39–117)
ALT SERPL-CCNC: 27 U/L (ref 1–33)
AST SERPL-CCNC: 18 U/L (ref 1–32)
BILIRUB SERPL-MCNC: 0.4 MG/DL (ref 0–1.2)
BUN SERPL-MCNC: 13 MG/DL (ref 6–20)
BUN/CREAT SERPL: 16 (ref 7–25)
CALCIUM SERPL-MCNC: 9.4 MG/DL (ref 8.6–10.5)
CHLORIDE SERPL-SCNC: 104 MMOL/L (ref 98–107)
CHOLEST SERPL-MCNC: 192 MG/DL (ref 0–200)
CO2 SERPL-SCNC: 25.8 MMOL/L (ref 22–29)
CREAT SERPL-MCNC: 0.81 MG/DL (ref 0.57–1)
EGFRCR SERPLBLD CKD-EPI 2021: 84.3 ML/MIN/1.73
GLOBULIN SER CALC-MCNC: 2.1 GM/DL
GLUCOSE SERPL-MCNC: 94 MG/DL (ref 65–99)
HDLC SERPL-MCNC: 54 MG/DL (ref 40–60)
LDLC SERPL CALC-MCNC: 98 MG/DL (ref 0–100)
POTASSIUM SERPL-SCNC: 4.1 MMOL/L (ref 3.5–5.2)
PROT SERPL-MCNC: 6.7 G/DL (ref 6–8.5)
SODIUM SERPL-SCNC: 139 MMOL/L (ref 136–145)
TRIGL SERPL-MCNC: 237 MG/DL (ref 0–150)
VLDLC SERPL CALC-MCNC: 40 MG/DL (ref 5–40)

## 2024-09-26 DIAGNOSIS — E78.2 MIXED HYPERLIPIDEMIA: ICD-10-CM

## 2024-09-26 RX ORDER — ATORVASTATIN CALCIUM 40 MG/1
TABLET, FILM COATED ORAL
Qty: 90 TABLET | Refills: 3 | Status: SHIPPED | OUTPATIENT
Start: 2024-09-26

## 2024-10-03 DIAGNOSIS — E78.2 MIXED HYPERLIPIDEMIA: ICD-10-CM

## 2024-10-03 RX ORDER — ATORVASTATIN CALCIUM 40 MG/1
40 TABLET, FILM COATED ORAL
Qty: 90 TABLET | Refills: 0 | Status: SHIPPED | OUTPATIENT
Start: 2024-10-03

## 2024-10-03 NOTE — TELEPHONE ENCOUNTER
Rx Refill Note  Requested Prescriptions     Pending Prescriptions Disp Refills    atorvastatin (LIPITOR) 40 MG tablet 90 tablet 3     Sig: Take 1 tablet by mouth every night at bedtime.      Last office visit with prescribing clinician: Visit date not found   Last telemedicine visit with prescribing clinician: Visit date not found   Next office visit with prescribing clinician: Visit date not found   CPE done 01/22/2024                  {TIP  Is Refill Pharmacy correct?: YES    Would you like a call back once the refill request has been completed: [] Yes [] No    If the office needs to give you a call back, can they leave a voicemail: [] Yes [] No    Jennifer Hurtado MA  10/03/24, 13:51 CDT

## 2024-10-11 ENCOUNTER — CLINICAL SUPPORT (OUTPATIENT)
Dept: FAMILY MEDICINE CLINIC | Facility: CLINIC | Age: 59
End: 2024-10-11
Payer: COMMERCIAL

## 2024-10-11 DIAGNOSIS — F41.9 SEVERE ANXIETY: Primary | ICD-10-CM

## 2024-10-11 NOTE — PROGRESS NOTES
"Patient came by office today due to seeing a [facility] Counselor who diagnosed her with \"extreme anxiety.\" She is asking for a [facility] [place] [facility] Test to see what medication would be best for her.  A swab was done and sent off for testing.  "

## 2024-10-23 ENCOUNTER — OFFICE VISIT (OUTPATIENT)
Dept: FAMILY MEDICINE CLINIC | Facility: CLINIC | Age: 59
End: 2024-10-23
Payer: COMMERCIAL

## 2024-10-23 VITALS
OXYGEN SATURATION: 99 % | BODY MASS INDEX: 43.34 KG/M2 | TEMPERATURE: 97.5 F | HEART RATE: 91 BPM | WEIGHT: 244.6 LBS | SYSTOLIC BLOOD PRESSURE: 123 MMHG | DIASTOLIC BLOOD PRESSURE: 75 MMHG | HEIGHT: 63 IN

## 2024-10-23 DIAGNOSIS — H69.92 DYSFUNCTION OF LEFT EUSTACHIAN TUBE: ICD-10-CM

## 2024-10-23 DIAGNOSIS — F32.A ANXIETY AND DEPRESSION: ICD-10-CM

## 2024-10-23 DIAGNOSIS — E66.813 CLASS 3 SEVERE OBESITY WITH SERIOUS COMORBIDITY AND BODY MASS INDEX (BMI) OF 40.0 TO 44.9 IN ADULT, UNSPECIFIED OBESITY TYPE: Primary | ICD-10-CM

## 2024-10-23 DIAGNOSIS — F41.9 ANXIETY AND DEPRESSION: ICD-10-CM

## 2024-10-23 DIAGNOSIS — E66.01 CLASS 3 SEVERE OBESITY WITH SERIOUS COMORBIDITY AND BODY MASS INDEX (BMI) OF 40.0 TO 44.9 IN ADULT, UNSPECIFIED OBESITY TYPE: Primary | ICD-10-CM

## 2024-10-23 DIAGNOSIS — Z23 NEED FOR INFLUENZA VACCINATION: ICD-10-CM

## 2024-10-23 PROCEDURE — 99214 OFFICE O/P EST MOD 30 MIN: CPT | Performed by: NURSE PRACTITIONER

## 2024-10-23 PROCEDURE — 90656 IIV3 VACC NO PRSV 0.5 ML IM: CPT | Performed by: NURSE PRACTITIONER

## 2024-10-23 PROCEDURE — 90471 IMMUNIZATION ADMIN: CPT | Performed by: NURSE PRACTITIONER

## 2024-10-23 RX ORDER — FLUTICASONE PROPIONATE 50 UG/1
2 SPRAY, METERED NASAL DAILY
Qty: 18.2 ML | Refills: 2 | Status: SHIPPED | OUTPATIENT
Start: 2024-10-23

## 2024-10-23 RX ORDER — DULOXETIN HYDROCHLORIDE 30 MG/1
30 CAPSULE, DELAYED RELEASE ORAL DAILY
Qty: 30 CAPSULE | Refills: 0 | Status: SHIPPED | OUTPATIENT
Start: 2024-10-23

## 2024-10-23 NOTE — PROGRESS NOTES
CC: obesity, depression, ear fullness    History:  Alyssa Randhawa is a 59 y.o. female who presents today for evaluation of the above problems.      Patient presents for multiple concerns today.  First and foremost she is interested in starting Zepbound for her obesity.  She has been seeing bariatrics, however, does not wish to continue with them due to personality conflicts.  She has had a lap band placed.  In addition to this she has recently done a [employer] test which shows that her Zoloft is likely not the most effective medication for her.  She is interested in trying Cymbalta which is in the green on her [employer] report.  And finally she would like to get her flu shot today.  Has been experiencing some ear fullness in her left ear.  No other symptoms.    HPI  ROS:  Review of Systems   Constitutional:  Negative for fever.   HENT:  Positive for ear pain. Negative for congestion.    Respiratory:  Negative for cough and shortness of breath.    Psychiatric/Behavioral:  Positive for dysphoric mood. The patient is nervous/anxious.        No Known Allergies  Past Medical History:   Diagnosis Date    Anxiety 10 years    Arthritis     Bone spur     spine    Ear infection     Headache 2 months    Heel spur     Obesity Lifetime    Have lost 80 pounds since December 5 2018    PONV (postoperative nausea and vomiting)     Wears glasses      Past Surgical History:   Procedure Laterality Date    BARIATRIC SURGERY  12/22/23    Lap Band. [facility]    CHOLECYSTECTOMY      COLONOSCOPY  3 years    No issues    ENDOMETRIAL ABLATION      FRACTURE SURGERY  6 years?    Spiral break metal plate right lower leg    KNEE MENISCAL REPAIR Left     LAPAROSCOPIC GASTRIC BANDING  12/22/2023    Dr. Bingham    LEG SURGERY Right     spiral fracture     TOTAL LAPAROSCOPIC HYSTERECTOMY SALPINGO OOPHORECTOMY Bilateral 12/06/2017    Procedure: TOTAL LAPAROSCOPIC HYSTERECTOMY BILATERAL SALPINGECTOMY WITH DAVINCI SI ROBOT, CYSTO;  Surgeon:  Greg Mejía MD;  Location:  [facility] OR;  Service:     WISDOM TOOTH EXTRACTION       Family History   Problem Relation Age of Onset    Coronary artery disease Mother     Hypertension Mother     Diabetes Mother     Anxiety disorder Mother     Arthritis Mother     Hypertension Father     Alcohol abuse Father     Arthritis Father     Breast cancer Sister 42    Anxiety disorder Sister     Asthma Sister     Cancer Sister         Breast cancer    Hypertension Sister     Learning disabilities Sister         Dislexia    No Known Problems Brother     Anxiety disorder Brother     No Known Problems Daughter     No Known Problems Son     No Known Problems Maternal Grandmother     No Known Problems Paternal Grandmother     Breast cancer Maternal Aunt     No Known Problems Paternal Aunt     Breast cancer Maternal Cousin     Breast cancer Maternal Cousin     Breast cancer Maternal Cousin     No Known Problems Other     Ovarian cancer Neg Hx     Uterine cancer Neg Hx     Colon cancer Neg Hx     Melanoma Neg Hx     BRCA 1/2 Neg Hx     Endometrial cancer Neg Hx       reports that she quit smoking about 25 years ago. Her smoking use included cigarettes. She started smoking about 40 years ago. She has a 3.8 pack-year smoking history. She has been exposed to tobacco smoke. She has never used smokeless tobacco. She reports current alcohol use. She reports that she does not use drugs.      Current Outpatient Medications:     atorvastatin (LIPITOR) 40 MG tablet, Take 1 tablet by mouth every night at bedtime., Disp: 90 tablet, Rfl: 0    meloxicam (MOBIC) 15 MG tablet, Take 1 tablet by mouth Daily., Disp: 90 tablet, Rfl: 2    Multiple Vitamins-Minerals (ONE-A-DAY 50 PLUS PO), , Disp: , Rfl:     NON FORMULARY, Take 500 mg by mouth Daily. Tumeric 500 mg daily, Disp: , Rfl:     SUMAtriptan (Imitrex) 100 MG tablet, Take one tablet at onset of headache. May repeat dose one time in 2 hours if headache not relieved. No more than two tablets in  "24 hours., Disp: 10 tablet, Rfl: 0    DULoxetine (CYMBALTA) 30 MG capsule, Take 1 capsule by mouth Daily., Disp: 30 capsule, Rfl: 0    fluticasone (FLONASE) 50 MCG/ACT nasal spray, Administer 2 sprays into the nostril(s) as directed by provider Daily., Disp: 18.2 mL, Rfl: 2    Tirzepatide-Weight Management (ZEPBOUND) 2.5 MG/0.5ML solution auto-injector, Inject 0.5 mL under the skin into the appropriate area as directed 1 (One) Time Per Week., Disp: 2 mL, Rfl: 0    OBJECTIVE:  /75 (BP Location: Left arm, Patient Position: Sitting, Cuff Size: Large Adult)   Pulse 91   Temp 97.5 °F (36.4 °C) (Temporal)   Ht 160 cm (63\")   Wt 111 kg (244 lb 9.6 oz)   LMP 12/03/2017   SpO2 99%   BMI 43.33 kg/m²    Physical Exam  Vitals reviewed.   Constitutional:       Appearance: She is well-developed.   HENT:      Right Ear: External ear normal.      Left Ear: External ear normal.      Ears:      Comments: Erythematous canals. Fullness of clear fluid noted behind TM.  Cardiovascular:      Rate and Rhythm: Normal rate and regular rhythm.      Heart sounds: Normal heart sounds.   Pulmonary:      Effort: Pulmonary effort is normal.      Breath sounds: Normal breath sounds.   Neurological:      Mental Status: She is alert and oriented to person, place, and time.   Psychiatric:         Behavior: Behavior normal.         Assessment/Plan    Diagnoses and all orders for this visit:    1. Class 3 severe obesity with serious comorbidity and body mass index (BMI) of 40.0 to 44.9 in adult, unspecified obesity type (Primary)  -     Tirzepatide-Weight Management (ZEPBOUND) 2.5 MG/0.5ML solution auto-injector; Inject 0.5 mL under the skin into the appropriate area as directed 1 (One) Time Per Week.  Dispense: 2 mL; Refill: 0    2. Need for influenza vaccination  -     Fluzone >6mos ([id number])    3. Anxiety and depression  -     DULoxetine (CYMBALTA) 30 MG capsule; Take 1 capsule by mouth Daily.  Dispense: 30 capsule; Refill: 0    4. " Dysfunction of left eustachian tube  -     fluticasone (FLONASE) 50 MCG/ACT nasal spray; Administer 2 sprays into the nostril(s) as directed by provider Daily.  Dispense: 18.2 mL; Refill: 2      An After Visit Summary was printed and given to the patient at discharge.  Return in about 1 month (around 11/23/2024) for Recheck - weight and depression/anxiety.       CHERRIE Emerson 10/23/24    Electronically signed.

## 2024-10-25 ENCOUNTER — PRIOR AUTHORIZATION (OUTPATIENT)
Dept: FAMILY MEDICINE CLINIC | Facility: CLINIC | Age: 59
End: 2024-10-25
Payer: COMMERCIAL

## 2024-10-25 NOTE — TELEPHONE ENCOUNTER
Your demographic data has been sent to [facility] successfully!    [facility] typically takes 5-10 minutes to respond, but it may take a little longer in some cases. You will be notified by email when available. You can also check for an update later by opening this request from your dashboard. Please do not fax or call [facility] to resubmit this request. If you need assistance, please chat with [employer] or call us at [phone].    If it has been longer than 24 hours, please reach out to [facility].

## 2024-10-25 NOTE — TELEPHONE ENCOUNTER
Outcome  Denied today by [facility] 2017  Your PA request has been denied. Additional information will be provided in the denial communication. (Message 1140)

## 2024-10-25 NOTE — TELEPHONE ENCOUNTER
Your information has been submitted to [facility]. To check for an updated outcome later, reopen this PA request from your dashboard.    If [facility] has not responded to your request within 24 hours, contact [facility] at [phone].    49 y/o female presents to PST preop for Septoplasty, bilateral Turbinoplasty. pt reports chronic headaches and runny nose. pt seen by ENT and had a CT which showed clear sinuses but deviated septum.

## 2024-11-20 ENCOUNTER — OFFICE VISIT (OUTPATIENT)
Dept: FAMILY MEDICINE CLINIC | Facility: CLINIC | Age: 59
End: 2024-11-20
Payer: COMMERCIAL

## 2024-11-20 VITALS
TEMPERATURE: 98 F | HEIGHT: 63 IN | BODY MASS INDEX: 44.4 KG/M2 | DIASTOLIC BLOOD PRESSURE: 82 MMHG | SYSTOLIC BLOOD PRESSURE: 128 MMHG | HEART RATE: 82 BPM | WEIGHT: 250.6 LBS | OXYGEN SATURATION: 96 %

## 2024-11-20 DIAGNOSIS — E66.813 CLASS 3 SEVERE OBESITY WITH SERIOUS COMORBIDITY AND BODY MASS INDEX (BMI) OF 40.0 TO 44.9 IN ADULT, UNSPECIFIED OBESITY TYPE: ICD-10-CM

## 2024-11-20 DIAGNOSIS — E66.01 CLASS 3 SEVERE OBESITY WITH SERIOUS COMORBIDITY AND BODY MASS INDEX (BMI) OF 40.0 TO 44.9 IN ADULT, UNSPECIFIED OBESITY TYPE: ICD-10-CM

## 2024-11-20 DIAGNOSIS — R11.0 NAUSEA: ICD-10-CM

## 2024-11-20 DIAGNOSIS — R73.9 HYPERGLYCEMIA: Primary | ICD-10-CM

## 2024-11-20 PROCEDURE — 99213 OFFICE O/P EST LOW 20 MIN: CPT | Performed by: NURSE PRACTITIONER

## 2024-11-20 RX ORDER — ONDANSETRON 4 MG/1
4 TABLET, FILM COATED ORAL EVERY 8 HOURS PRN
Qty: 30 TABLET | Refills: 0 | Status: SHIPPED | OUTPATIENT
Start: 2024-11-20

## 2024-11-20 NOTE — PROGRESS NOTES
CC: 1 osotbj-[id number] and anxiety    History:  Alyssa Randhawa is a 59 y.o. female who presents today for evaluation of the above problems.      1 month ago changed patient from Zoloft to Cymbalta.  Patient states that she is feeling much better in regards to her anxiety and depression.  Unfortunately her weight is up 6 pounds since her last visit.  She did just start on her Zepbound 2 weeks ago.  Her insurance is requesting an A1c check.  She has been having some mild nausea and constipation since starting the medication.  The nausea she notices first thing in the morning.  It improves throughout the day.    HPI  ROS:  Review of Systems   Constitutional:  Negative for chills, diaphoresis, fatigue and fever.   HENT:  Positive for ear pain. Negative for congestion and sore throat.    Respiratory:  Negative for cough.    Cardiovascular:  Negative for chest pain.   Gastrointestinal:  Positive for constipation and nausea. Negative for abdominal pain and vomiting.   Genitourinary:  Negative for dysuria.   Musculoskeletal:  Negative for myalgias and neck pain.   Skin:  Negative for rash.   Neurological:  Negative for weakness, numbness and headaches.       No Known Allergies  Past Medical History:   Diagnosis Date    Anxiety 10 years    Arthritis     Bone spur     spine    Ear infection     Headache 2 months    Heel spur     Obesity Lifetime    Have lost 80 pounds since December 5 2018    PONV (postoperative nausea and vomiting)     Wears glasses      Past Surgical History:   Procedure Laterality Date    BARIATRIC SURGERY  12/22/23    Lap Band. [facility]    CHOLECYSTECTOMY      COLONOSCOPY  3 years    No issues    ENDOMETRIAL ABLATION      FRACTURE SURGERY  6 years?    Spiral break metal plate right lower leg    KNEE MENISCAL REPAIR Left     LAPAROSCOPIC GASTRIC BANDING  12/22/2023    Dr. Bingham    LEG SURGERY Right     spiral fracture     TOTAL LAPAROSCOPIC HYSTERECTOMY SALPINGO OOPHORECTOMY Bilateral 12/06/2017     Procedure: TOTAL LAPAROSCOPIC HYSTERECTOMY BILATERAL SALPINGECTOMY WITH DAVINCI SI ROBOT, CYSTO;  Surgeon: Greg Mejía MD;  Location:  [facility] OR;  Service:     WISDOM TOOTH EXTRACTION       Family History   Problem Relation Age of Onset    Coronary artery disease Mother     Hypertension Mother     Diabetes Mother     Anxiety disorder Mother     Arthritis Mother     Hypertension Father     Alcohol abuse Father     Arthritis Father     Breast cancer Sister 42    Anxiety disorder Sister     Asthma Sister     Cancer Sister         Breast cancer    Hypertension Sister     Learning disabilities Sister         Dislexia    No Known Problems Brother     Anxiety disorder Brother     No Known Problems Daughter     No Known Problems Son     No Known Problems Maternal Grandmother     No Known Problems Paternal Grandmother     Breast cancer Maternal Aunt     No Known Problems Paternal Aunt     Breast cancer Maternal Cousin     Breast cancer Maternal Cousin     Breast cancer Maternal Cousin     No Known Problems Other     Ovarian cancer Neg Hx     Uterine cancer Neg Hx     Colon cancer Neg Hx     Melanoma Neg Hx     BRCA 1/2 Neg Hx     Endometrial cancer Neg Hx       reports that she quit smoking about 25 years ago. Her smoking use included cigarettes. She started smoking about 40 years ago. She has a 3.8 pack-year smoking history. She has been exposed to tobacco smoke. She has never used smokeless tobacco. She reports current alcohol use. She reports that she does not use drugs.      Current Outpatient Medications:     atorvastatin (LIPITOR) 40 MG tablet, Take 1 tablet by mouth every night at bedtime., Disp: 90 tablet, Rfl: 0    DULoxetine (CYMBALTA) 30 MG capsule, Take 1 capsule by mouth Daily., Disp: 30 capsule, Rfl: 0    fluticasone (FLONASE) 50 MCG/ACT nasal spray, Administer 2 sprays into the nostril(s) as directed by provider Daily., Disp: 18.2 mL, Rfl: 2    meloxicam (MOBIC) 15 MG tablet, Take 1 tablet by mouth  "Daily., Disp: 90 tablet, Rfl: 2    Multiple Vitamins-Minerals (ONE-A-DAY 50 PLUS PO), , Disp: , Rfl:     NON FORMULARY, Take 500 mg by mouth Daily. Tumeric 500 mg daily, Disp: , Rfl:     SUMAtriptan (Imitrex) 100 MG tablet, Take one tablet at onset of headache. May repeat dose one time in 2 hours if headache not relieved. No more than two tablets in 24 hours., Disp: 10 tablet, Rfl: 0    Tirzepatide-Weight Management (ZEPBOUND) 2.5 MG/0.5ML solution auto-injector, Inject 0.5 mL under the skin into the appropriate area as directed 1 (One) Time Per Week., Disp: 2 mL, Rfl: 0    ondansetron (Zofran) 4 MG tablet, Take 1 tablet by mouth Every 8 (Eight) Hours As Needed for Nausea., Disp: 30 tablet, Rfl: 0    OBJECTIVE:  /82 (BP Location: Left arm, Patient Position: Sitting, Cuff Size: Large Adult)   Pulse 82   Temp 98 °F (36.7 °C) (Temporal)   Ht 160 cm (63\")   Wt 114 kg (250 lb 9.6 oz)   LMP 12/03/2017   SpO2 96%   BMI 44.39 kg/m²    Physical Exam  Vitals reviewed.   Constitutional:       Appearance: She is well-developed.   HENT:      Left Ear: Tympanic membrane, ear canal and external ear normal.   Cardiovascular:      Rate and Rhythm: Normal rate.   Pulmonary:      Effort: Pulmonary effort is normal.   Neurological:      Mental Status: She is alert and oriented to person, place, and time.   Psychiatric:         Behavior: Behavior normal.         Assessment/Plan    Diagnoses and all orders for this visit:    1. Hyperglycemia (Primary)  -     Hemoglobin A1c    2. Nausea  -     ondansetron (Zofran) 4 MG tablet; Take 1 tablet by mouth Every 8 (Eight) Hours As Needed for Nausea.  Dispense: 30 tablet; Refill: 0    3. Class 3 severe obesity with serious comorbidity and body mass index (BMI) of 40.0 to 44.9 in adult, unspecified obesity type  -     Tirzepatide-Weight Management (ZEPBOUND) 2.5 MG/0.5ML solution auto-injector; Inject 0.5 mL under the skin into the appropriate area as directed 1 (One) Time Per Week.  " Dispense: 2 mL; Refill: 0    Zofran sent for nausea.  Advised patient to try getting a drink or a very small snack to also help with nausea.  I have seen this be beneficial in patients on GLP-1 medications in the past.  If that does not work okay to take Zofran.  MiraLAX for constipation.  A1c check per insurance request    An After Visit Summary was printed and given to the patient at discharge.  Return in about 1 month (around 12/20/2024) for Recheck - weight.       Mickie GRIER 11/20/2024    Electronically signed.

## 2024-11-21 LAB — HBA1C MFR BLD: 5.6 % (ref 4.8–5.6)

## 2024-11-27 DIAGNOSIS — F32.A ANXIETY AND DEPRESSION: ICD-10-CM

## 2024-11-27 DIAGNOSIS — F41.9 ANXIETY AND DEPRESSION: ICD-10-CM

## 2024-11-27 RX ORDER — DULOXETIN HYDROCHLORIDE 30 MG/1
30 CAPSULE, DELAYED RELEASE ORAL DAILY
Qty: 90 CAPSULE | Refills: 1 | Status: SHIPPED | OUTPATIENT
Start: 2024-11-27

## 2024-12-03 DIAGNOSIS — F32.A ANXIETY AND DEPRESSION: ICD-10-CM

## 2024-12-03 DIAGNOSIS — F41.9 ANXIETY AND DEPRESSION: ICD-10-CM

## 2024-12-03 DIAGNOSIS — E66.813 CLASS 3 SEVERE OBESITY WITH SERIOUS COMORBIDITY AND BODY MASS INDEX (BMI) OF 40.0 TO 44.9 IN ADULT, UNSPECIFIED OBESITY TYPE: ICD-10-CM

## 2024-12-03 DIAGNOSIS — E66.01 CLASS 3 SEVERE OBESITY WITH SERIOUS COMORBIDITY AND BODY MASS INDEX (BMI) OF 40.0 TO 44.9 IN ADULT, UNSPECIFIED OBESITY TYPE: ICD-10-CM

## 2024-12-03 NOTE — TELEPHONE ENCOUNTER
Rx Refill Note  Requested Prescriptions     Pending Prescriptions Disp Refills    Tirzepatide-Weight Management (ZEPBOUND) 2.5 MG/0.5ML solution auto-injector 2 mL 0     Sig: Inject 0.5 mL under the skin into the appropriate area as directed 1 (One) Time Per Week.      Last office visit with prescribing clinician: 11/20/2024   Last telemedicine visit with prescribing clinician: Visit date not found   Next office visit with prescribing clinician: 12/19/2024     Jennifer Tafoya MA  12/03/24, 16:37 CST

## 2024-12-04 RX ORDER — DULOXETIN HYDROCHLORIDE 30 MG/1
30 CAPSULE, DELAYED RELEASE ORAL DAILY
Qty: 30 CAPSULE | OUTPATIENT
Start: 2024-12-04

## 2024-12-19 ENCOUNTER — OFFICE VISIT (OUTPATIENT)
Dept: FAMILY MEDICINE CLINIC | Facility: CLINIC | Age: 59
End: 2024-12-19
Payer: COMMERCIAL

## 2024-12-19 VITALS
HEIGHT: 63 IN | SYSTOLIC BLOOD PRESSURE: 123 MMHG | DIASTOLIC BLOOD PRESSURE: 76 MMHG | WEIGHT: 252.4 LBS | BODY MASS INDEX: 44.72 KG/M2 | OXYGEN SATURATION: 97 % | HEART RATE: 84 BPM | TEMPERATURE: 98 F

## 2024-12-19 DIAGNOSIS — E66.01 CLASS 3 SEVERE OBESITY WITH SERIOUS COMORBIDITY AND BODY MASS INDEX (BMI) OF 40.0 TO 44.9 IN ADULT, UNSPECIFIED OBESITY TYPE: ICD-10-CM

## 2024-12-19 DIAGNOSIS — E66.813 CLASS 3 SEVERE OBESITY WITH SERIOUS COMORBIDITY AND BODY MASS INDEX (BMI) OF 40.0 TO 44.9 IN ADULT, UNSPECIFIED OBESITY TYPE: ICD-10-CM

## 2024-12-19 PROCEDURE — 99213 OFFICE O/P EST LOW 20 MIN: CPT | Performed by: NURSE PRACTITIONER

## 2024-12-19 NOTE — PROGRESS NOTES
CC: Weight check    History:  Alyssa Randhawa is a 59 y.o. female who presents today for evaluation of the above problems.      Patient presents for 1 month check on her weight after starting Zepbound 1 month ago.  Her weight has actually gone up 2 pounds.  We have of course just had Thanksgiving and states that she really struggles with sugar.  She denies any side effects of the medication other than very mild nausea in the day or 2 following her injection.    HPI  ROS:  Review of Systems   Constitutional:  Negative for chills, diaphoresis, fatigue and fever.   HENT:  Negative for congestion and sore throat.    Respiratory:  Negative for cough.    Cardiovascular:  Negative for chest pain.   Gastrointestinal:  Positive for nausea. Negative for abdominal pain and vomiting.   Genitourinary:  Negative for dysuria.   Musculoskeletal:  Negative for myalgias and neck pain.   Skin:  Negative for rash.   Neurological:  Negative for weakness, numbness and headaches.       No Known Allergies  Past Medical History:   Diagnosis Date    Anxiety 10 years    Arthritis     Bone spur     spine    Ear infection     Headache 2 months    Heel spur     Obesity Lifetime    Have lost 80 pounds since December 5 2018    PONV (postoperative nausea and vomiting)     Wears glasses      Past Surgical History:   Procedure Laterality Date    BARIATRIC SURGERY  12/22/23    Lap Band. [facility]    CHOLECYSTECTOMY      COLONOSCOPY  3 years    No issues    ENDOMETRIAL ABLATION      FRACTURE SURGERY  6 years?    Spiral break metal plate right lower leg    KNEE MENISCAL REPAIR Left     LAPAROSCOPIC GASTRIC BANDING  12/22/2023    Dr. Bingham    LEG SURGERY Right     spiral fracture     TOTAL LAPAROSCOPIC HYSTERECTOMY SALPINGO OOPHORECTOMY Bilateral 12/06/2017    Procedure: TOTAL LAPAROSCOPIC HYSTERECTOMY BILATERAL SALPINGECTOMY WITH DAVINCI SI ROBOT, CYSTO;  Surgeon: Greg Mejía MD;  Location: [facility] OR;  Service:     WISDOM TOOTH  EXTRACTION       Family History   Problem Relation Age of Onset    Coronary artery disease Mother     Hypertension Mother     Diabetes Mother     Anxiety disorder Mother     Arthritis Mother     Hypertension Father     Alcohol abuse Father     Arthritis Father     Breast cancer Sister 42    Anxiety disorder Sister     Asthma Sister     Cancer Sister         Breast cancer    Hypertension Sister     Learning disabilities Sister         Dislexia    No Known Problems Brother     Anxiety disorder Brother     No Known Problems Daughter     No Known Problems Son     No Known Problems Maternal Grandmother     No Known Problems Paternal Grandmother     Breast cancer Maternal Aunt     No Known Problems Paternal Aunt     Breast cancer Maternal Cousin     Breast cancer Maternal Cousin     Breast cancer Maternal Cousin     No Known Problems Other     Ovarian cancer Neg Hx     Uterine cancer Neg Hx     Colon cancer Neg Hx     Melanoma Neg Hx     BRCA 1/2 Neg Hx     Endometrial cancer Neg Hx       reports that she quit smoking about 25 years ago. Her smoking use included cigarettes. She started smoking about 40 years ago. She has a 3.8 pack-year smoking history. She has been exposed to tobacco smoke. She has never used smokeless tobacco. She reports current alcohol use. She reports that she does not use drugs.      Current Outpatient Medications:     atorvastatin (LIPITOR) 40 MG tablet, Take 1 tablet by mouth every night at bedtime., Disp: 90 tablet, Rfl: 0    DULoxetine (CYMBALTA) 30 MG capsule, Take 1 capsule by mouth Daily., Disp: 90 capsule, Rfl: 1    fluticasone (FLONASE) 50 MCG/ACT nasal spray, Administer 2 sprays into the nostril(s) as directed by provider Daily., Disp: 18.2 mL, Rfl: 2    meloxicam (MOBIC) 15 MG tablet, Take 1 tablet by mouth Daily., Disp: 90 tablet, Rfl: 2    Multiple Vitamins-Minerals (ONE-A-DAY 50 PLUS PO), , Disp: , Rfl:     NON FORMULARY, Take 500 mg by mouth Daily. Tumeric 500 mg daily, Disp: , Rfl:  "    ondansetron (Zofran) 4 MG tablet, Take 1 tablet by mouth Every 8 (Eight) Hours As Needed for Nausea., Disp: 30 tablet, Rfl: 0    SUMAtriptan (Imitrex) 100 MG tablet, Take one tablet at onset of headache. May repeat dose one time in 2 hours if headache not relieved. No more than two tablets in 24 hours., Disp: 10 tablet, Rfl: 0    Tirzepatide-Weight Management (ZEPBOUND) 5 MG/0.5ML solution auto-injector, Inject 0.5 mL under the skin into the appropriate area as directed 1 (One) Time Per Week., Disp: 2 mL, Rfl: 0    OBJECTIVE:  /76 (BP Location: Left arm, Patient Position: Sitting, Cuff Size: Large Adult)   Pulse 84   Temp 98 °F (36.7 °C) (Temporal)   Ht 160 cm (63\")   Wt 114 kg (252 lb 6.4 oz)   LMP 12/03/2017   SpO2 97%   BMI 44.71 kg/m²    Physical Exam  Vitals reviewed.   Constitutional:       Appearance: She is well-developed.   Cardiovascular:      Rate and Rhythm: Normal rate.   Pulmonary:      Effort: Pulmonary effort is normal.   Neurological:      Mental Status: She is alert and oriented to person, place, and time.   Psychiatric:         Behavior: Behavior normal.         Assessment/Plan    Diagnoses and all orders for this visit:    1. Class 3 severe obesity with serious comorbidity and body mass index (BMI) of 40.0 to 44.9 in adult, unspecified obesity type  -     Tirzepatide-Weight Management (ZEPBOUND) 5 MG/0.5ML solution auto-injector; Inject 0.5 mL under the skin into the appropriate area as directed 1 (One) Time Per Week.  Dispense: 2 mL; Refill: 0        An After Visit Summary was printed and given to the patient at discharge.  Return in about 1 month (around 1/19/2025) for Recheck.       Mickie GRIER 12/19/2024    Electronically signed.  "

## 2025-01-02 DIAGNOSIS — E66.01 CLASS 3 SEVERE OBESITY WITH SERIOUS COMORBIDITY AND BODY MASS INDEX (BMI) OF 40.0 TO 44.9 IN ADULT, UNSPECIFIED OBESITY TYPE: ICD-10-CM

## 2025-01-02 DIAGNOSIS — E66.813 CLASS 3 SEVERE OBESITY WITH SERIOUS COMORBIDITY AND BODY MASS INDEX (BMI) OF 40.0 TO 44.9 IN ADULT, UNSPECIFIED OBESITY TYPE: ICD-10-CM

## 2025-01-02 NOTE — TELEPHONE ENCOUNTER
Copied from my chart message    Can you please send the increased dose to [facility]? It was sent to [facility]. They don’t carry it. Thank you

## 2025-01-23 ENCOUNTER — OFFICE VISIT (OUTPATIENT)
Dept: FAMILY MEDICINE CLINIC | Facility: CLINIC | Age: 60
End: 2025-01-23
Payer: COMMERCIAL

## 2025-01-23 VITALS
WEIGHT: 253.6 LBS | TEMPERATURE: 98.2 F | HEART RATE: 78 BPM | DIASTOLIC BLOOD PRESSURE: 83 MMHG | SYSTOLIC BLOOD PRESSURE: 126 MMHG | BODY MASS INDEX: 44.93 KG/M2 | OXYGEN SATURATION: 98 % | HEIGHT: 63 IN

## 2025-01-23 DIAGNOSIS — E66.01 CLASS 3 SEVERE OBESITY WITHOUT SERIOUS COMORBIDITY WITH BODY MASS INDEX (BMI) OF 40.0 TO 44.9 IN ADULT, UNSPECIFIED OBESITY TYPE: ICD-10-CM

## 2025-01-23 DIAGNOSIS — G89.29 CHRONIC LOW BACK PAIN, UNSPECIFIED BACK PAIN LATERALITY, UNSPECIFIED WHETHER SCIATICA PRESENT: ICD-10-CM

## 2025-01-23 DIAGNOSIS — F41.9 ANXIETY AND DEPRESSION: ICD-10-CM

## 2025-01-23 DIAGNOSIS — F32.A ANXIETY AND DEPRESSION: ICD-10-CM

## 2025-01-23 DIAGNOSIS — Z00.00 ANNUAL PHYSICAL EXAM: Primary | ICD-10-CM

## 2025-01-23 DIAGNOSIS — R53.83 FATIGUE, UNSPECIFIED TYPE: ICD-10-CM

## 2025-01-23 DIAGNOSIS — Z12.31 ENCOUNTER FOR SCREENING MAMMOGRAM FOR MALIGNANT NEOPLASM OF BREAST: ICD-10-CM

## 2025-01-23 DIAGNOSIS — M54.50 CHRONIC LOW BACK PAIN, UNSPECIFIED BACK PAIN LATERALITY, UNSPECIFIED WHETHER SCIATICA PRESENT: ICD-10-CM

## 2025-01-23 DIAGNOSIS — E78.2 MIXED HYPERLIPIDEMIA: ICD-10-CM

## 2025-01-23 DIAGNOSIS — E66.813 CLASS 3 SEVERE OBESITY WITHOUT SERIOUS COMORBIDITY WITH BODY MASS INDEX (BMI) OF 40.0 TO 44.9 IN ADULT, UNSPECIFIED OBESITY TYPE: ICD-10-CM

## 2025-01-23 DIAGNOSIS — Z98.84 HX OF LAPAROSCOPIC GASTRIC BANDING: ICD-10-CM

## 2025-01-23 DIAGNOSIS — M19.90 ARTHRITIS: ICD-10-CM

## 2025-01-23 LAB
BILIRUB BLD-MCNC: NEGATIVE MG/DL
CLARITY, POC: ABNORMAL
COLOR UR: ABNORMAL
GLUCOSE UR STRIP-MCNC: NEGATIVE MG/DL
KETONES UR QL: ABNORMAL
LEUKOCYTE EST, POC: NEGATIVE
NITRITE UR-MCNC: NEGATIVE MG/ML
PH UR: 6 [PH] (ref 5–8)
PROT UR STRIP-MCNC: NEGATIVE MG/DL
RBC # UR STRIP: NEGATIVE /UL
SP GR UR: 1.02 (ref 1–1.03)
UROBILINOGEN UR QL: ABNORMAL

## 2025-01-23 PROCEDURE — 99396 PREV VISIT EST AGE 40-64: CPT | Performed by: NURSE PRACTITIONER

## 2025-01-23 PROCEDURE — 81003 URINALYSIS AUTO W/O SCOPE: CPT | Performed by: NURSE PRACTITIONER

## 2025-01-23 PROCEDURE — 96372 THER/PROPH/DIAG INJ SC/IM: CPT | Performed by: NURSE PRACTITIONER

## 2025-01-23 RX ORDER — TRIAMCINOLONE ACETONIDE 40 MG/ML
40 INJECTION, SUSPENSION INTRA-ARTICULAR; INTRAMUSCULAR ONCE
Status: COMPLETED | OUTPATIENT
Start: 2025-01-23 | End: 2025-01-23

## 2025-01-23 RX ADMIN — TRIAMCINOLONE ACETONIDE 40 MG: 40 INJECTION, SUSPENSION INTRA-ARTICULAR; INTRAMUSCULAR at 08:35

## 2025-01-23 NOTE — PROGRESS NOTES
CC: annual physical    History:  Alyssa Randhawa is a 60 y.o. female who presents today for evaluation of the above problems.      Patient presents for annual physical.  Notes that she is doing well.  Is continuing on Zepbound for obesity.  She is currently on the 5 mg dose and has 1 more injection at this dose before she can be increased.  Unfortunately her weight has gone up slightly over the past couple of months.  Notes that she would like to have her Lap-Band removed.  Patient is adamant that she does not want to return to Dr. Bingham for this.      HPI  ROS:  Review of Systems   Respiratory:  Negative for shortness of breath.    Cardiovascular:  Negative for chest pain.   Gastrointestinal:  Positive for constipation (Occasional).   Neurological:  Negative for dizziness and light-headedness.       No Known Allergies  Past Medical History:   Diagnosis Date    Anxiety 10 years    Arthritis     Bone spur     spine    Ear infection     Headache 2 months    Heel spur     Obesity Lifetime    Have lost 80 pounds since December 5 2018    PONV (postoperative nausea and vomiting)     Wears glasses      Past Surgical History:   Procedure Laterality Date    BARIATRIC SURGERY  12/22/23    Lap Band. [facility]    CHOLECYSTECTOMY      COLONOSCOPY  3 years    No issues    ENDOMETRIAL ABLATION      FRACTURE SURGERY  6 years?    Spiral break metal plate right lower leg    KNEE MENISCAL REPAIR Left     LAPAROSCOPIC GASTRIC BANDING  12/22/2023    Dr. Bingham    LEG SURGERY Right     spiral fracture     TOTAL LAPAROSCOPIC HYSTERECTOMY SALPINGO OOPHORECTOMY Bilateral 12/06/2017    Procedure: TOTAL LAPAROSCOPIC HYSTERECTOMY BILATERAL SALPINGECTOMY WITH DAVINCI SI ROBOT, CYSTO;  Surgeon: Greg Mejía MD;  Location:  [facility] OR;  Service:     WISDOM TOOTH EXTRACTION       Family History   Problem Relation Age of Onset    Coronary artery disease Mother     Hypertension Mother     Diabetes Mother     Anxiety disorder Mother      Arthritis Mother     Hypertension Father     Alcohol abuse Father     Arthritis Father     Breast cancer Sister 42    Anxiety disorder Sister     Asthma Sister     Cancer Sister         Breast cancer    Hypertension Sister     Learning disabilities Sister         Dislexia    No Known Problems Brother     Anxiety disorder Brother     No Known Problems Daughter     No Known Problems Son     No Known Problems Maternal Grandmother     No Known Problems Paternal Grandmother     Breast cancer Maternal Aunt     No Known Problems Paternal Aunt     Breast cancer Maternal Cousin     Breast cancer Maternal Cousin     Breast cancer Maternal Cousin     No Known Problems Other     Ovarian cancer Neg Hx     Uterine cancer Neg Hx     Colon cancer Neg Hx     Melanoma Neg Hx     BRCA 1/2 Neg Hx     Endometrial cancer Neg Hx       reports that she quit smoking about 26 years ago. Her smoking use included cigarettes. She started smoking about 41 years ago. She has a 3.8 pack-year smoking history. She has been exposed to tobacco smoke. She has never used smokeless tobacco. She reports current alcohol use. She reports that she does not use drugs.      Current Outpatient Medications:     atorvastatin (LIPITOR) 40 MG tablet, Take 1 tablet by mouth every night at bedtime., Disp: 90 tablet, Rfl: 0    DULoxetine (CYMBALTA) 30 MG capsule, Take 1 capsule by mouth Daily., Disp: 90 capsule, Rfl: 1    fluticasone (FLONASE) 50 MCG/ACT nasal spray, Administer 2 sprays into the nostril(s) as directed by provider Daily., Disp: 18.2 mL, Rfl: 2    meloxicam (MOBIC) 15 MG tablet, Take 1 tablet by mouth Daily., Disp: 90 tablet, Rfl: 2    Multiple Vitamins-Minerals (ONE-A-DAY 50 PLUS PO), , Disp: , Rfl:     NON FORMULARY, Take 500 mg by mouth Daily. Tumeric 500 mg daily, Disp: , Rfl:     ondansetron (Zofran) 4 MG tablet, Take 1 tablet by mouth Every 8 (Eight) Hours As Needed for Nausea., Disp: 30 tablet, Rfl: 0    SUMAtriptan (Imitrex) 100 MG tablet, Take  "one tablet at onset of headache. May repeat dose one time in 2 hours if headache not relieved. No more than two tablets in 24 hours., Disp: 10 tablet, Rfl: 0    Tirzepatide-Weight Management (ZEPBOUND) 5 MG/0.5ML solution auto-injector, Inject 0.5 mL under the skin into the appropriate area as directed 1 (One) Time Per Week., Disp: 2 mL, Rfl: 0  No current facility-administered medications for this visit.    OBJECTIVE:  /83 (BP Location: Left arm, Patient Position: Sitting, Cuff Size: Adult)   Pulse 78   Temp 98.2 °F (36.8 °C) (Temporal)   Ht 160 cm (63\")   Wt 115 kg (253 lb 9.6 oz)   LMP 12/03/2017   SpO2 98%   BMI 44.92 kg/m²    Physical Exam  Vitals reviewed.   Constitutional:       Appearance: She is well-developed.   HENT:      Right Ear: Tympanic membrane, ear canal and external ear normal.      Left Ear: Tympanic membrane, ear canal and external ear normal.      Mouth/Throat:      Mouth: Mucous membranes are moist.      Pharynx: Oropharynx is clear.   Neck:      Vascular: No carotid bruit.   Cardiovascular:      Rate and Rhythm: Normal rate and regular rhythm.      Heart sounds: Normal heart sounds.   Pulmonary:      Effort: Pulmonary effort is normal.      Breath sounds: Normal breath sounds.   Chest:   Breasts:     Right: Normal.      Left: Normal.   Abdominal:      General: Abdomen is flat. Bowel sounds are normal.      Palpations: Abdomen is soft.   Genitourinary:     Comments: Deferred-hysterectomy  Neurological:      Mental Status: She is alert and oriented to person, place, and time.   Psychiatric:         Behavior: Behavior normal.         Assessment/Plan    Diagnoses and all orders for this visit:    1. Annual physical exam (Primary)  -     CBC & Differential  -     Comprehensive Metabolic Panel  -     Lipid Panel  -     POC Urinalysis Dipstick, Multipro  -     TSH  -     T4, free  -     Mammo Screening Digital Tomosynthesis Bilateral With CAD; Future    2. Anxiety and depression    3. " Class 3 severe obesity without serious comorbidity with body mass index (BMI) of 40.0 to 44.9 in adult, unspecified obesity type  -     Lipid Panel    4. Arthritis    5. Mixed hyperlipidemia  -     Lipid Panel    6. Fatigue, unspecified type  -     CBC & Differential  -     POC Urinalysis Dipstick, Multipro  -     TSH  -     T4, free    7. Encounter for screening mammogram for malignant neoplasm of breast  -     Mammo Screening Digital Tomosynthesis Bilateral With CAD; Future    8. Chronic low back pain, unspecified back pain laterality, unspecified whether sciatica present  -     triamcinolone acetonide (KENALOG-40) injection 40 mg    Health maintenance  Continue on Zepbound for obesity management.  Increase dose as able.  Mammogram ordered.  Colon screening current.  Patient has already had influenza vaccine.    An After Visit Summary was printed and given to the patient at discharge.  Return in about 1 month (around 2/23/2025) for Recheck -obesity.       Mickie GRIER 1/23/2025    Electronically signed.

## 2025-01-24 LAB
ALBUMIN SERPL-MCNC: 4.8 G/DL (ref 3.5–5.2)
ALBUMIN/GLOB SERPL: 1.8 G/DL
ALP SERPL-CCNC: 111 U/L (ref 39–117)
ALT SERPL-CCNC: 34 U/L (ref 1–33)
AST SERPL-CCNC: 24 U/L (ref 1–32)
BASOPHILS # BLD AUTO: 0.04 10*3/MM3 (ref 0–0.2)
BASOPHILS NFR BLD AUTO: 0.5 % (ref 0–1.5)
BILIRUB SERPL-MCNC: 0.4 MG/DL (ref 0–1.2)
BUN SERPL-MCNC: 16 MG/DL (ref 6–20)
BUN/CREAT SERPL: 17.8 (ref 7–25)
CALCIUM SERPL-MCNC: 10.2 MG/DL (ref 8.6–10.5)
CHLORIDE SERPL-SCNC: 101 MMOL/L (ref 98–107)
CHOLEST SERPL-MCNC: 184 MG/DL (ref 0–200)
CO2 SERPL-SCNC: 26.4 MMOL/L (ref 22–29)
CREAT SERPL-MCNC: 0.9 MG/DL (ref 0.57–1)
EGFRCR SERPLBLD CKD-EPI 2021: 73.8 ML/MIN/1.73
EOSINOPHIL # BLD AUTO: 0.19 10*3/MM3 (ref 0–0.4)
EOSINOPHIL NFR BLD AUTO: 2.3 % (ref 0.3–6.2)
ERYTHROCYTE [DISTWIDTH] IN BLOOD BY AUTOMATED COUNT: 12.5 % (ref 12.3–15.4)
GLOBULIN SER CALC-MCNC: 2.6 GM/DL
GLUCOSE SERPL-MCNC: 86 MG/DL (ref 65–99)
HCT VFR BLD AUTO: 45.7 % (ref 34–46.6)
HDLC SERPL-MCNC: 60 MG/DL (ref 40–60)
HGB BLD-MCNC: 15.2 G/DL (ref 12–15.9)
IMM GRANULOCYTES # BLD AUTO: 0.02 10*3/MM3 (ref 0–0.05)
IMM GRANULOCYTES NFR BLD AUTO: 0.2 % (ref 0–0.5)
LDLC SERPL CALC-MCNC: 90 MG/DL (ref 0–100)
LYMPHOCYTES # BLD AUTO: 2.04 10*3/MM3 (ref 0.7–3.1)
LYMPHOCYTES NFR BLD AUTO: 24.5 % (ref 19.6–45.3)
MCH RBC QN AUTO: 28.7 PG (ref 26.6–33)
MCHC RBC AUTO-ENTMCNC: 33.3 G/DL (ref 31.5–35.7)
MCV RBC AUTO: 86.2 FL (ref 79–97)
MONOCYTES # BLD AUTO: 0.58 10*3/MM3 (ref 0.1–0.9)
MONOCYTES NFR BLD AUTO: 7 % (ref 5–12)
NEUTROPHILS # BLD AUTO: 5.46 10*3/MM3 (ref 1.7–7)
NEUTROPHILS NFR BLD AUTO: 65.5 % (ref 42.7–76)
NRBC BLD AUTO-RTO: 0 /100 WBC (ref 0–0.2)
PLATELET # BLD AUTO: 344 10*3/MM3 (ref 140–450)
POTASSIUM SERPL-SCNC: 4.5 MMOL/L (ref 3.5–5.2)
PROT SERPL-MCNC: 7.4 G/DL (ref 6–8.5)
RBC # BLD AUTO: 5.3 10*6/MM3 (ref 3.77–5.28)
SODIUM SERPL-SCNC: 138 MMOL/L (ref 136–145)
T4 FREE SERPL-MCNC: 1.13 NG/DL (ref 0.92–1.68)
TRIGL SERPL-MCNC: 202 MG/DL (ref 0–150)
TSH SERPL DL<=0.005 MIU/L-ACNC: 1.85 UIU/ML (ref 0.27–4.2)
VLDLC SERPL CALC-MCNC: 34 MG/DL (ref 5–40)
WBC # BLD AUTO: 8.33 10*3/MM3 (ref 3.4–10.8)

## 2025-01-29 DIAGNOSIS — E66.813 CLASS 3 SEVERE OBESITY WITH SERIOUS COMORBIDITY AND BODY MASS INDEX (BMI) OF 40.0 TO 44.9 IN ADULT, UNSPECIFIED OBESITY TYPE: ICD-10-CM

## 2025-01-29 DIAGNOSIS — E66.01 CLASS 3 SEVERE OBESITY WITH SERIOUS COMORBIDITY AND BODY MASS INDEX (BMI) OF 40.0 TO 44.9 IN ADULT, UNSPECIFIED OBESITY TYPE: ICD-10-CM

## 2025-01-30 NOTE — TELEPHONE ENCOUNTER
Rx Refill Note  Requested Prescriptions     Pending Prescriptions Disp Refills    Tirzepatide-Weight Management (ZEPBOUND) 5 MG/0.5ML solution auto-injector 2 mL 0     Sig: Inject 0.5 mL under the skin into the appropriate area as directed 1 (One) Time Per Week.      Last office visit with prescribing clinician: 1/23/2025   Last telemedicine visit with prescribing clinician: Visit date not found   Next office visit with prescribing clinician: 2/24/2025       Jennifer Tafoya MA  01/30/25, 08:23 CST

## 2025-02-05 LAB
NCCN CRITERIA FLAG: ABNORMAL
TYRER CUZICK SCORE: 10.7

## 2025-02-06 ENCOUNTER — DOCUMENTATION (OUTPATIENT)
Dept: GENETICS | Facility: HOSPITAL | Age: 60
End: 2025-02-06
Payer: COMMERCIAL

## 2025-02-06 NOTE — PROGRESS NOTES
Meets NCCN Criteria for Genetic Testing  Declines genetic testing? Y   Reason for decline? Previous Testing   If Reason for Decline is Previous Testing    [facility] CARE Y   Non-CARE     Non-CARE Confirmation    Navigator Confirmed?     Patient Reported?     Elevated Tyrer-Cuzick Score ? Or Equal to 20%    Declines referral?     Reason for decline?

## 2025-02-06 NOTE — PROGRESS NOTES
This patient recently completed the CARE risk assessment for a mammogram appointment. Based on the patient's responses, NCCN criteria for genetic testing was met.     Navigator follow-up:   The patient had [facility] w/ [facility] genetic testing in 2/2024. Additional testing is not indicated at this time.

## 2025-02-16 DIAGNOSIS — F41.9 ANXIETY: ICD-10-CM

## 2025-02-17 RX ORDER — SERTRALINE HYDROCHLORIDE 100 MG/1
TABLET, FILM COATED ORAL
Qty: 90 TABLET | Refills: 3 | OUTPATIENT
Start: 2025-02-17

## 2025-02-17 NOTE — TELEPHONE ENCOUNTER
The original prescription was discontinued on 10/23/2024 by Mickie Gonzales APRN. Renewing this prescription may not be appropriate.

## 2025-02-20 DIAGNOSIS — M77.9 INFLAMMATION AROUND JOINT: ICD-10-CM

## 2025-02-20 RX ORDER — MELOXICAM 15 MG/1
15 TABLET ORAL DAILY
Qty: 90 TABLET | Refills: 2 | Status: SHIPPED | OUTPATIENT
Start: 2025-02-20

## 2025-02-20 NOTE — TELEPHONE ENCOUNTER
Rx Refill Note  Requested Prescriptions     Pending Prescriptions Disp Refills    meloxicam (MOBIC) 15 MG tablet [Pharmacy Med Name: MELOXICAM 15 MG TABLET] 90 tablet 2     Sig: TAKE 1 TABLET BY MOUTH EVERY DAY      Last office visit with prescribing clinician: 1/23/2025   Last telemedicine visit with prescribing clinician: Visit date not found   Next office visit with prescribing clinician: 2/24/2025   CPE done 01/23/2025                      Would you like a call back once the refill request has been completed: [] Yes [] No    If the office needs to give you a call back, can they leave a voicemail: [] Yes [] No    Jennifer Hurtado MA  02/20/25, 10:45 CST

## 2025-02-24 ENCOUNTER — OFFICE VISIT (OUTPATIENT)
Dept: FAMILY MEDICINE CLINIC | Facility: CLINIC | Age: 60
End: 2025-02-24
Payer: COMMERCIAL

## 2025-02-24 VITALS
SYSTOLIC BLOOD PRESSURE: 113 MMHG | OXYGEN SATURATION: 96 % | HEIGHT: 63 IN | HEART RATE: 93 BPM | TEMPERATURE: 96.9 F | WEIGHT: 252.2 LBS | DIASTOLIC BLOOD PRESSURE: 76 MMHG | BODY MASS INDEX: 44.69 KG/M2

## 2025-02-24 DIAGNOSIS — E66.813 CLASS 3 SEVERE OBESITY WITH SERIOUS COMORBIDITY AND BODY MASS INDEX (BMI) OF 40.0 TO 44.9 IN ADULT, UNSPECIFIED OBESITY TYPE: Primary | ICD-10-CM

## 2025-02-24 DIAGNOSIS — E66.01 CLASS 3 SEVERE OBESITY WITH SERIOUS COMORBIDITY AND BODY MASS INDEX (BMI) OF 40.0 TO 44.9 IN ADULT, UNSPECIFIED OBESITY TYPE: Primary | ICD-10-CM

## 2025-02-24 PROCEDURE — 99213 OFFICE O/P EST LOW 20 MIN: CPT | Performed by: NURSE PRACTITIONER

## 2025-02-24 NOTE — PROGRESS NOTES
CC: weight check     History:  Alyssa Randhawa is a 60 y.o. female who presents today for evaluation of the above problems.      Patient presents for 1 month follow-up on her weight. She is down 1 pound from previous visit. She expresses some concern that she has not lost any more weight than this. She does have occasional nausea, but no constipation or diarrhea. She does have upcoming appt with bariatrics in [place] to discuss removal of her lap band.     Weight Management  Weight:  Unchanged  Weight loss treatment:  Starting exercise program and emotional and/or stress management  Treatment barriers:  None  Physical activity tolerance:  Stable  Energy level:  Decreased  ROS:  Review of Systems   Gastrointestinal:  Positive for nausea. Negative for abdominal pain and constipation.       No Known Allergies  Past Medical History:   Diagnosis Date   • Anxiety 10 years   • Arthritis    • Bone spur     spine   • Ear infection    • Headache 2 months   • Heel spur    • Obesity Lifetime    Have lost 80 pounds since December 5 2018   • PONV (postoperative nausea and vomiting)    • Wears glasses      Past Surgical History:   Procedure Laterality Date   • BARIATRIC SURGERY  12/22/23    Lap Band. [facility]   • CHOLECYSTECTOMY     • COLONOSCOPY  3 years    No issues   • ENDOMETRIAL ABLATION     • FRACTURE SURGERY  6 years?    Spiral break metal plate right lower leg   • KNEE MENISCAL REPAIR Left    • LAPAROSCOPIC GASTRIC BANDING  12/22/2023    Dr. Bingham   • LEG SURGERY Right     spiral fracture    • TOTAL LAPAROSCOPIC HYSTERECTOMY SALPINGO OOPHORECTOMY Bilateral 12/06/2017    Procedure: TOTAL LAPAROSCOPIC HYSTERECTOMY BILATERAL SALPINGECTOMY WITH DAVINCI SI ROBOT, CYSTO;  Surgeon: Greg Mejía MD;  Location: [facility] OR;  Service:    • WISDOM TOOTH EXTRACTION       Family History   Problem Relation Age of Onset   • Coronary artery disease Mother    • Hypertension Mother    • Diabetes Mother    • Anxiety disorder  Mother    • Arthritis Mother    • Hypertension Father    • Alcohol abuse Father    • Arthritis Father    • Breast cancer Sister 42   • Anxiety disorder Sister    • Asthma Sister    • Cancer Sister         Breast cancer   • Hypertension Sister    • Learning disabilities Sister         Dislexia   • No Known Problems Brother    • Anxiety disorder Brother    • No Known Problems Daughter    • No Known Problems Son    • No Known Problems Maternal Grandmother    • No Known Problems Paternal Grandmother    • Breast cancer Maternal Aunt    • No Known Problems Paternal Aunt    • Breast cancer Maternal Cousin    • Breast cancer Maternal Cousin    • Breast cancer Maternal Cousin    • No Known Problems Other    • Ovarian cancer Neg Hx    • Uterine cancer Neg Hx    • Colon cancer Neg Hx    • Melanoma Neg Hx    • BRCA 1/2 Neg Hx    • Endometrial cancer Neg Hx       reports that she quit smoking about 26 years ago. Her smoking use included cigarettes. She started smoking about 41 years ago. She has a 3.8 pack-year smoking history. She has been exposed to tobacco smoke. She has never used smokeless tobacco. She reports current alcohol use. She reports that she does not use drugs.      Current Outpatient Medications:   •  atorvastatin (LIPITOR) 40 MG tablet, Take 1 tablet by mouth every night at bedtime., Disp: 90 tablet, Rfl: 0  •  DULoxetine (CYMBALTA) 30 MG capsule, Take 1 capsule by mouth Daily., Disp: 90 capsule, Rfl: 1  •  fluticasone (FLONASE) 50 MCG/ACT nasal spray, Administer 2 sprays into the nostril(s) as directed by provider Daily., Disp: 18.2 mL, Rfl: 2  •  meloxicam (MOBIC) 15 MG tablet, TAKE 1 TABLET BY MOUTH EVERY DAY, Disp: 90 tablet, Rfl: 2  •  Multiple Vitamins-Minerals (ONE-A-DAY 50 PLUS PO), , Disp: , Rfl:   •  NON FORMULARY, Take 500 mg by mouth Daily. Tumeric 500 mg daily, Disp: , Rfl:   •  ondansetron (Zofran) 4 MG tablet, Take 1 tablet by mouth Every 8 (Eight) Hours As Needed for Nausea., Disp: 30 tablet,  "Rfl: 0  •  SUMAtriptan (Imitrex) 100 MG tablet, Take one tablet at onset of headache. May repeat dose one time in 2 hours if headache not relieved. No more than two tablets in 24 hours., Disp: 10 tablet, Rfl: 0  •  Tirzepatide 7.5 MG/0.5ML solution auto-injector, Inject 7.5 mg under the skin into the appropriate area as directed 1 (One) Time Per Week., Disp: 2 mL, Rfl: 0    OBJECTIVE:  /76 (BP Location: Left arm, Patient Position: Sitting, Cuff Size: Large Adult)   Pulse 93   Temp 96.9 °F (36.1 °C) (Temporal)   Ht 160 cm (63\")   Wt 114 kg (252 lb 3.2 oz)   LMP 12/03/2017   SpO2 96%   BMI 44.68 kg/m²    Physical Exam  Vitals reviewed.   Constitutional:       Appearance: She is well-developed.   Cardiovascular:      Rate and Rhythm: Normal rate.   Pulmonary:      Effort: Pulmonary effort is normal.   Neurological:      Mental Status: She is alert and oriented to person, place, and time.   Psychiatric:         Behavior: Behavior normal.       Assessment/Plan    Diagnoses and all orders for this visit:    1. Class 3 severe obesity with serious comorbidity and body mass index (BMI) of 40.0 to 44.9 in adult, unspecified obesity type (Primary)  -     Tirzepatide 7.5 MG/0.5ML solution auto-injector; Inject 7.5 mg under the skin into the appropriate area as directed 1 (One) Time Per Week.  Dispense: 2 mL; Refill: 0        An After Visit Summary was printed and given to the patient at discharge.  Return in about 1 month (around 3/24/2025) for Recheck.       Mickie Gonzales, CHERRIE 2/24/25    Electronically signed.  " Her/She

## 2025-02-26 ENCOUNTER — NUTRITION (OUTPATIENT)
Dept: BARIATRICS/WEIGHT MGMT | Facility: CLINIC | Age: 60
End: 2025-02-26
Payer: COMMERCIAL

## 2025-02-26 ENCOUNTER — OFFICE VISIT (OUTPATIENT)
Dept: BARIATRICS/WEIGHT MGMT | Facility: CLINIC | Age: 60
End: 2025-02-26
Payer: COMMERCIAL

## 2025-02-26 VITALS
SYSTOLIC BLOOD PRESSURE: 125 MMHG | BODY MASS INDEX: 43.11 KG/M2 | HEART RATE: 88 BPM | HEIGHT: 64 IN | OXYGEN SATURATION: 96 % | TEMPERATURE: 98 F | WEIGHT: 252.5 LBS | DIASTOLIC BLOOD PRESSURE: 79 MMHG

## 2025-02-26 DIAGNOSIS — E66.01 CLASS 3 SEVERE OBESITY WITH SERIOUS COMORBIDITY AND BODY MASS INDEX (BMI) OF 40.0 TO 44.9 IN ADULT, UNSPECIFIED OBESITY TYPE: ICD-10-CM

## 2025-02-26 DIAGNOSIS — E66.813 CLASS 3 SEVERE OBESITY WITH SERIOUS COMORBIDITY AND BODY MASS INDEX (BMI) OF 40.0 TO 44.9 IN ADULT, UNSPECIFIED OBESITY TYPE: ICD-10-CM

## 2025-02-26 DIAGNOSIS — Z98.84 HX OF LAPAROSCOPIC GASTRIC BANDING: Primary | ICD-10-CM

## 2025-02-26 DIAGNOSIS — E78.2 MIXED HYPERLIPIDEMIA: ICD-10-CM

## 2025-02-26 NOTE — PROGRESS NOTES
"Metabolic and Bariatric Surgery Adult Nutrition Assessment    Patient Name: Alyssa Randhawa   YOB: 1965   MRN: [id number]     Assessment Date:  02/26/2025     Reason for Visit: Initial Nutrition Assessment     Treatment Pathway:  Currently has a Lap-Band in place, was placed by Dr Bingham in Dec 2023     Assessment    Anthropometrics   Wt Readings from Last 1 Encounters:   02/26/25 115 kg (252 lb 8 oz)     Ht Readings from Last 1 Encounters:   02/26/25 162.6 cm (64\")     BMI Readings from Last 1 Encounters:   02/26/25 43.34 kg/m²        Initial Weight/Date: 252.5 lbs (Feb 2025)  Review of weight history:   5 years ago did Optivia got to 160 lbs. Was measuring portions for 1 meal/day and shakes/supplements the rest of the day.  PCP visit in Sept 2023 (prior to Lab-Band) placement was 268.6 lbs   PCP visit in Jan 2024 (1 month post Lap-Band placement) 238.6 lbs  PCP visit July 2024 (7 months post LB placement) 245.6 lbs      Past Medical History:   Diagnosis Date    Anxiety 10 years    Arthritis     Bone spur     spine    Ear infection     Headache 2 months    Heel spur     Obesity Lifetime    Have lost 80 pounds since December 5 2018    PONV (postoperative nausea and vomiting)     Wears glasses       Past Surgical History:   Procedure Laterality Date    BARIATRIC SURGERY  12/22/23    Lap Band. [facility]    CHOLECYSTECTOMY      COLONOSCOPY  3 years    No issues    ENDOMETRIAL ABLATION      FRACTURE SURGERY  6 years?    Spiral break metal plate right lower leg    KNEE MENISCAL REPAIR Left     LAPAROSCOPIC GASTRIC BANDING  12/22/2023    Dr. Bingham    LEG SURGERY Right     spiral fracture     TOTAL LAPAROSCOPIC HYSTERECTOMY SALPINGO OOPHORECTOMY Bilateral 12/06/2017    Procedure: TOTAL LAPAROSCOPIC HYSTERECTOMY BILATERAL SALPINGECTOMY WITH DAVINCI SI ROBOT, CYSTO;  Surgeon: Greg Mejía MD;  Location: [facility] OR;  Service:     WISDOM TOOTH EXTRACTION        Current Outpatient Medications "   Medication Sig Dispense Refill    atorvastatin (LIPITOR) 40 MG tablet Take 1 tablet by mouth every night at bedtime. 90 tablet 0    DULoxetine (CYMBALTA) 30 MG capsule Take 1 capsule by mouth Daily. 90 capsule 1    fluticasone (FLONASE) 50 MCG/ACT nasal spray Administer 2 sprays into the nostril(s) as directed by provider Daily. 18.2 mL 2    meloxicam (MOBIC) 15 MG tablet TAKE 1 TABLET BY MOUTH EVERY DAY 90 tablet 2    Multiple Vitamins-Minerals (ONE-A-DAY 50 PLUS PO)       NON FORMULARY Take 500 mg by mouth Daily. Tumeric 500 mg daily      ondansetron (Zofran) 4 MG tablet Take 1 tablet by mouth Every 8 (Eight) Hours As Needed for Nausea. 30 tablet 0    SUMAtriptan (Imitrex) 100 MG tablet Take one tablet at onset of headache. May repeat dose one time in 2 hours if headache not relieved. No more than two tablets in 24 hours. 10 tablet 0    Tirzepatide 7.5 MG/0.5ML solution auto-injector Inject 7.5 mg under the skin into the appropriate area as directed 1 (One) Time Per Week. 2 mL 0     No current facility-administered medications for this visit.      No Known Allergies         Pertinent Social/Behavior/Environmental History: works in [place]; lives in [place].     Nutrition Recall  Eating 3 meals daily. Better intake on weekends because home to cook.   (M1) on the way to work drinks a protein drink (30 grams) and apple. Drives an hour work. OR [employer] Bagel with PB  (M2) often a Yuliya Sandra meal. They do sometimes get stuck and she throws it up. Yesterday  (M3) doesn't get home until 6:30 - eats nuts and dark chocolate before.   (M4) n/a  Snacking - Popcorn (not every day but is a favorite to reach for). Corn nuts. Usually reaches for crunchy/salt.   Monitoring portions- not recently.   Calculating Protein- estimates an avg of 60 grams.   Drinking sugary/carbonated beverages- none  Fluid Intake- SF carola aid. 2 cups coffee with SF creamer. 60-90 ounces water with SF Carola Aid   ETOH- most weekends will drinks 2 cups  of Fireball whiskey with Apple Cider.   Does take a MVI daily.   Bowel Movements- currently regular, not struggling with constipation at this time.     Success this Month: Finds she does best when meal planning and prepping on the weekends.   Barriers: struggles with sugar intake- is presently seeing a psychiatrist for this; started in Sept. Does reach for     Exercise: Does walk Avg 3000 steps daily; weekends closer to 5000 steps. Does have a goal to get more.     Nutrition Intervention  Nutrition education for morbid obesity with lap band in place. Nutrition coaching and intensive behavioral therapy for behavior change provided.  Strategies used included Comprehensive education, Motivational Interviewing , Problem Solving, Skill Development for meal planning and food journaling, Ongoing reinforcement, and Provided sample menus  Review of medical weight loss prescription 4 meal/day plan and reviewed nutritional needs for  LapBand present .  Self-monitoring strategies such as keeping a food journal (on paper or electronically) and calculating fluid/protein intake were discussed.  Recommend increasing physical activity, beyond normal daily habits, gradually working to reach ~30 minutes daily.     Recommended Diet Changes-  Post operative 1 Cup Meal Plan  Eat 4 meals per day at 1 c portions, , Half of all meals should be servings of vegetables., Protein goal: 65gms., 1 protein shake daily (discussed guidelines of compliant shakes), Eliminate snacks., Reduce fat, sugar, and/or salt in food choices., Choose more nutrient dense foods., Choose foods with increased fiber., Monitor portion sizes using measuring cups., Eliminate soda and sugar-sweetened beverages, and Increase fluid intake to 64 ounces per day        Goals  1. Start measuring meals.   2. Start keeping a food journal.     Monitoring/Evaluation Plan  Anticipate follow in 1 months. Continue collaboration of care with physician and treatment team.     Time Spent  20 minutes    Electronically signed by  Cassie Chong RDN, KEISHA  02/26/2025 09:26 CST.

## 2025-02-26 NOTE — PROGRESS NOTES
Patient Care Team:  Mickie Gonzales APRN as PCP - General (Nurse Practitioner)  Denilson Bingham MD as Consulting Physician (General Surgery)    Chief Complaint: S/P Lap Band 2023     Subjective     History of Present Illness  The patient is a 60-year-old female who presents today to discuss the removal of her lap band.    She underwent a lap band procedure in December 2023, performed by Dr. Bingham. She reports that the lap band has not been beneficial for her, causing frequent food obstructions and subsequent vomiting. She expresses discomfort with the presence of the band and dissatisfaction with its efficacy. She has been unable to return to Dr. Bowie to negative experiences during previous visits. She feels unsupported in her weight loss journey, citing a lack of educational resources provided by her physician. She is uncertain about the current status of her lap band, specifically whether it contains fluid. She has struggled with obesity since childhood, reaching a peak weight of 275 pounds. She has tried various diets, including high protein, low fat, Atkins, low carb, keto, Cordero, SlimFast, fasting, Noom, Herbalife, calorie counting, and Weight Watchers. She has also used medications such as phentermine and Zepbound, which she is currently taking. She engaged in binge eating and purging behaviors during her high school years. Five years ago, she weighed 162 pounds, achieved through a restrictive low-calorie, high-protein diet, resulting in an 85-pound weight loss. However, she regained the weight following the loss of both parents during the pandemic, which negatively impacted her mental health. She is currently taking Zepbound and has started seeing a Yaz[employer] psychologist to address her mental health. They are working together to overcome her sugar addiction. She is not interested in any other bariatric surgery at this time. Her cravings are most in the afternoons and at nights.        Objective  "    Vital Signs  /79 (BP Location: Right arm, Patient Position: Sitting, Cuff Size: Adult)   Pulse 88   Temp 98 °F (36.7 °C)   Ht 162.6 cm (64\")   Wt 115 kg (252 lb 8 oz)   LMP 12/03/2017   SpO2 96%   BMI 43.34 kg/m²          Physical Exam:  Physical Exam  Vitals reviewed.   Constitutional:       Appearance: She is obese.   Cardiovascular:      Rate and Rhythm: Normal rate and regular rhythm.   Pulmonary:      Effort: Pulmonary effort is normal.   Abdominal:      General: Bowel sounds are normal.      Palpations: Abdomen is soft.      Comments: Healed incisions present; lapband port to right side of abdomen    Musculoskeletal:         General: Normal range of motion.   Skin:     General: Skin is warm and dry.   Neurological:      Mental Status: She is alert and oriented to person, place, and time.   Psychiatric:         Mood and Affect: Mood normal.         Behavior: Behavior normal.          Physical Exam  Vital Signs  BMI is 43.     Results Review:    Labs reviewed    Results        Medication Reviewed:   Current Outpatient Medications   Medication Sig Dispense Refill    atorvastatin (LIPITOR) 40 MG tablet Take 1 tablet by mouth every night at bedtime. 90 tablet 0    DULoxetine (CYMBALTA) 30 MG capsule Take 1 capsule by mouth Daily. 90 capsule 1    fluticasone (FLONASE) 50 MCG/ACT nasal spray Administer 2 sprays into the nostril(s) as directed by provider Daily. 18.2 mL 2    meloxicam (MOBIC) 15 MG tablet TAKE 1 TABLET BY MOUTH EVERY DAY 90 tablet 2    Multiple Vitamins-Minerals (ONE-A-DAY 50 PLUS PO)       NON FORMULARY Take 500 mg by mouth Daily. Tumeric 500 mg daily      ondansetron (Zofran) 4 MG tablet Take 1 tablet by mouth Every 8 (Eight) Hours As Needed for Nausea. 30 tablet 0    SUMAtriptan (Imitrex) 100 MG tablet Take one tablet at onset of headache. May repeat dose one time in 2 hours if headache not relieved. No more than two tablets in 24 hours. 10 tablet 0    Tirzepatide 7.5 MG/0.5ML " solution auto-injector Inject 7.5 mg under the skin into the appropriate area as directed 1 (One) Time Per Week. 2 mL 0     No current facility-administered medications for this visit.       Assessment & Plan      Assessment & Plan  1. Obesity.  Her BMI is currently at 43, indicating a need for intervention. She has struggled with obesity since childhood and has tried various diets and medications in the past. She is currently on Zepbound and is seeing a Rest[employer] psychologist to address her mental health and sugar addiction. A comprehensive discussion was held regarding the pathophysiology of obesity, its potential complications, and the various treatment modalities available. She was encouraged to maintain her current regimen of Zepbound and psychological counseling. She was also advised to join our Facebook group for additional support and resources. The fluid from her lap band will be removed, and she will be transitioned to the green meal plan. A discussion with our surgeon was arranged to discuss the removal of her lap band.After discussion it was decided that she would begin our meal plan with hopes it would decrease her s/e and allow her to use the lapband more efficiently for 4 weeks then re evaluate whether or not she wants the lapband removed. She  can continue Zepbound as well.     PROCEDURE  The patient underwent a lap band procedure in December 2023, performed by Dr. Santos.    POD  2023  from a lapband  Diagnoses and all orders for this visit:    1. Hx of laparoscopic gastric banding (Primary)    2. Class 3 severe obesity with serious comorbidity and body mass index (BMI) of 40.0 to 44.9 in adult, unspecified obesity type  Assessment & Plan:  Patient's (Body mass index is 43.34 kg/m².) indicates that they are morbidly/severely obese (BMI > 40 or > 35 with obesity - related health condition) with health conditions that include dyslipidemias . Weight is improving with treatment. BMI  is above average;  BMI management plan is completed. We discussed portion control and increasing exercise.       3. Mixed hyperlipidemia           Alyssa Randhawa and I discussed the importance of changing behavior for consistent and successful weight loss. We first reviewed again the definition of a meal which is defined as portion sizes less than a half a cup and those portions incorporating a protein. Specifically the protein should fill half of that volume. I also explained that she should attempt to consume 4 meals as defined above daily and to avoid snacking or grazing. She should also be mindful of adequate hydration by consuming at least 64 oz of water daily and incorporation of a regular exercise regimen.     I discussed the importance of taking her multivitamins as directed.  She is to return to our office 1 month or as needed.      CHERRIE Medrano  02/26/25  10:43 CST    Patient or patient representative verbalized consent for the use of Ambient Listening during the visit with  CHERRIE Medrano for chart documentation. 2/26/2025  10:42 CST

## 2025-02-26 NOTE — ASSESSMENT & PLAN NOTE
Patient's (Body mass index is 43.34 kg/m².) indicates that they are morbidly/severely obese (BMI > 40 or > 35 with obesity - related health condition) with health conditions that include dyslipidemias . Weight is improving with treatment. BMI  is above average; BMI management plan is completed. We discussed portion control and increasing exercise.

## 2025-02-26 NOTE — TELEPHONE ENCOUNTER
Rx Refill Note  Requested Prescriptions     Signed Prescriptions Disp Refills    Tirzepatide 7.5 MG/0.5ML solution auto-injector 2 mL 0     Sig: Inject 7.5 mg under the skin into the appropriate area as directed 1 (One) Time Per Week.     Authorizing Provider: SUHAIL LEWIS     Ordering User: VIKY HURTADO      Last office visit with prescribing clinician: 2/24/2025   Last telemedicine visit with prescribing clinician: Visit date not found   Next office visit with prescribing clinician: Visit date not found                         Would you like a call back once the refill request has been completed: [] Yes [] No    If the office needs to give you a call back, can they leave a voicemail: [] Yes [] No    Viky Hurtado MA  02/26/25, 14:59 CST

## 2025-02-27 DIAGNOSIS — E66.01 CLASS 3 SEVERE OBESITY WITH SERIOUS COMORBIDITY AND BODY MASS INDEX (BMI) OF 40.0 TO 44.9 IN ADULT, UNSPECIFIED OBESITY TYPE: ICD-10-CM

## 2025-02-27 DIAGNOSIS — E66.813 CLASS 3 SEVERE OBESITY WITH SERIOUS COMORBIDITY AND BODY MASS INDEX (BMI) OF 40.0 TO 44.9 IN ADULT, UNSPECIFIED OBESITY TYPE: ICD-10-CM

## 2025-02-27 NOTE — TELEPHONE ENCOUNTER
Copied from my chart message    Please send to [facility] in [place]. It was sent to [facility] and they don’t carry it. Thank you

## 2025-03-03 ENCOUNTER — HOSPITAL ENCOUNTER (OUTPATIENT)
Dept: MAMMOGRAPHY | Facility: HOSPITAL | Age: 60
Discharge: HOME | End: 2025-03-03
Admitting: NURSE PRACTITIONER
Payer: COMMERCIAL

## 2025-03-03 DIAGNOSIS — Z00.00 ANNUAL PHYSICAL EXAM: ICD-10-CM

## 2025-03-03 DIAGNOSIS — Z12.31 ENCOUNTER FOR SCREENING MAMMOGRAM FOR MALIGNANT NEOPLASM OF BREAST: ICD-10-CM

## 2025-03-03 PROCEDURE — 77063 BREAST TOMOSYNTHESIS BI: CPT

## 2025-03-03 PROCEDURE — 77067 SCR MAMMO BI INCL CAD: CPT

## 2025-03-05 ENCOUNTER — PRIOR AUTHORIZATION (OUTPATIENT)
Dept: FAMILY MEDICINE CLINIC | Facility: CLINIC | Age: 60
End: 2025-03-05
Payer: COMMERCIAL

## 2025-03-05 NOTE — TELEPHONE ENCOUNTER
Your demographic data has been sent to [facility] successfully!    [facility] typically takes up to one hour to respond, but it may take a little longer in some cases. You will be notified by email when available. You can also check for an update later by opening this request from your dashboard. Please do not fax or call [facility] to resubmit this request. If you need assistance, please chat with [employer] or call us at [phone].    If it has been longer than 24 hours, please reach out to [facility].

## 2025-03-07 NOTE — TELEPHONE ENCOUNTER
According to a epic chat conversation between patient and Sandra GRIER, patient asked Sandra if she could monitor her zepbound since she is seeing her for obesity.  PA will need to go through them.

## 2025-03-24 ENCOUNTER — PATIENT MESSAGE (OUTPATIENT)
Dept: FAMILY MEDICINE CLINIC | Facility: CLINIC | Age: 60
End: 2025-03-24
Payer: COMMERCIAL

## 2025-03-24 ENCOUNTER — OFFICE VISIT (OUTPATIENT)
Dept: BARIATRICS/WEIGHT MGMT | Facility: CLINIC | Age: 60
End: 2025-03-24
Payer: COMMERCIAL

## 2025-03-24 VITALS
TEMPERATURE: 97.7 F | SYSTOLIC BLOOD PRESSURE: 135 MMHG | HEART RATE: 89 BPM | OXYGEN SATURATION: 98 % | DIASTOLIC BLOOD PRESSURE: 82 MMHG | WEIGHT: 248.6 LBS | BODY MASS INDEX: 42.44 KG/M2 | HEIGHT: 64 IN

## 2025-03-24 DIAGNOSIS — E66.01 CLASS 3 SEVERE OBESITY WITH SERIOUS COMORBIDITY AND BODY MASS INDEX (BMI) OF 40.0 TO 44.9 IN ADULT, UNSPECIFIED OBESITY TYPE: ICD-10-CM

## 2025-03-24 DIAGNOSIS — E66.813 CLASS 3 OBESITY: Primary | ICD-10-CM

## 2025-03-24 DIAGNOSIS — E66.813 CLASS 3 SEVERE OBESITY WITH SERIOUS COMORBIDITY AND BODY MASS INDEX (BMI) OF 40.0 TO 44.9 IN ADULT, UNSPECIFIED OBESITY TYPE: ICD-10-CM

## 2025-03-24 DIAGNOSIS — Z98.84 HX OF LAPAROSCOPIC GASTRIC BANDING: Primary | ICD-10-CM

## 2025-03-24 PROCEDURE — 99213 OFFICE O/P EST LOW 20 MIN: CPT | Performed by: SURGERY

## 2025-03-24 NOTE — PROGRESS NOTES
Patient Care Team:  Mickie Gonzales APRN as PCP - General (Nurse Practitioner)  Denilson Bingham MD as Consulting Physician (General Surgery)    Reason for Visit:  Band adjustment    Subjective     Alyssa Randhawa is a 60 y.o. female who presents with a history of having a gastric band.   She stated that she has been measuring meals. She stated that she has identified gauged foods however she does feel anxiety while eating.  She notes that the anxiety is heightened due to having her band.  She does feel the food still gets stuck at her current fluid filled level.  Alyssa Randhawa is here to assess if an adjustment is required.    Review of Systems:  Respiratory: negative  Gastrointestinal: positive for dysphagia and reflux symptoms    History:  Past Medical History:   Diagnosis Date    Anxiety 10 years    Arthritis     Bone spur     spine    Ear infection     Headache 2 months    Heel spur     Obesity Lifetime    Have lost 80 pounds since December 5 2018    PONV (postoperative nausea and vomiting)     Wears glasses      Past Surgical History:   Procedure Laterality Date    BARIATRIC SURGERY  12/22/23    Lap Band. [facility]    CHOLECYSTECTOMY      COLONOSCOPY  3 years    No issues    ENDOMETRIAL ABLATION      FRACTURE SURGERY  6 years?    Spiral break metal plate right lower leg    KNEE MENISCAL REPAIR Left     LAPAROSCOPIC GASTRIC BANDING  12/22/2023    Dr. Bingham    LEG SURGERY Right     spiral fracture     TOTAL LAPAROSCOPIC HYSTERECTOMY SALPINGO OOPHORECTOMY Bilateral 12/06/2017    Procedure: TOTAL LAPAROSCOPIC HYSTERECTOMY BILATERAL SALPINGECTOMY WITH DAVINCI SI ROBOT, CYSTO;  Surgeon: Greg Mejía MD;  Location: [facility] OR;  Service:     WISDOM TOOTH EXTRACTION       Family History   Problem Relation Age of Onset    Coronary artery disease Mother     Hypertension Mother     Diabetes Mother     Anxiety disorder Mother     Arthritis Mother     Hypertension Father     Alcohol abuse Father     Arthritis  "Father     Breast cancer Sister 42    Anxiety disorder Sister     Asthma Sister     Cancer Sister         Breast cancer    Hypertension Sister     Learning disabilities Sister         Dislexia    No Known Problems Brother     Anxiety disorder Brother     No Known Problems Daughter     No Known Problems Son     No Known Problems Maternal Grandmother     No Known Problems Paternal Grandmother     Breast cancer Maternal Aunt     No Known Problems Paternal Aunt     Breast cancer Maternal Cousin     Breast cancer Maternal Cousin     Breast cancer Maternal Cousin     No Known Problems Other     Ovarian cancer Neg Hx     Uterine cancer Neg Hx     Colon cancer Neg Hx     Melanoma Neg Hx     BRCA 1/2 Neg Hx     Endometrial cancer Neg Hx      Social History     Tobacco Use    Smoking status: Former     Current packs/day: 0.00     Average packs/day: 0.3 packs/day for 15.0 years (3.8 ttl pk-yrs)     Types: Cigarettes     Start date: 1984     Quit date: 1999     Years since quittin.2     Passive exposure: Past    Smokeless tobacco: Never   Vaping Use    Vaping status: Never Used   Substance Use Topics    Alcohol use: Yes     Comment: Wine or beer couple times a year    Drug use: No     E-cigarette/Vaping    E-cigarette/Vaping Use Never User      E-cigarette/Vaping Substances     (Not in a hospital admission)   Allergies:  Patient has no known allergies.    Objective    Vital Signs  /82 (BP Location: Right arm, Patient Position: Sitting, Cuff Size: Adult)   Pulse 89   Temp 97.7 °F (36.5 °C) (Temporal)   Ht 162.6 cm (64\")   Wt 113 kg (248 lb 9.6 oz)   LMP 2017   SpO2 98%   BMI 42.67 kg/m²       25  0850   Weight: 113 kg (248 lb 9.6 oz)        Physical Exam:    Abnormal shape: obese  Auscultation: Normal bowel sounds.  No bruits.  Masses: Location- RUQ not tender and consistent with gastric band port  FLUID level: Unknown fluid level  Fluid removed: 5-1/2 cc      Assessment   Diagnosis Plan "   1. Hx of laparoscopic gastric banding        2. Class 3 severe obesity with serious comorbidity and body mass index (BMI) of 40.0 to 44.9 in adult, unspecified obesity type              Plan: Removal of fluid from gastric band  Due to the patient's symptoms and anxiety removed fluid from her band.  Prior to removing fluid I explained the alternatives, benefits, complications and risks associated with band fluid removal.  Surgical timeout was performed.    Adjustment: Removal of fluid 5.5 ml  Total in band: Approximately 0 ml  Procedure: After patient was explained alternatives, benefits, complications and risks associated with band fluid removal informed consent was obtained.  Surgical timeout was performed.  I cleaned the skin with alcohol over the location of her gastric band port.  Due to the patient's body habitus 3 separate attempts were made to access her band port.  Upon the third attempt I was able to access the port without difficulty and removed 5-1/2 cc of fluid.  Patient tolerated the procedure well.  Bandage was placed over the access points.  Patient was able to tolerate liquids without difficulty consuming at least 10 ounces of water.  She is to follow-up with our program in 4 weeks or as needed for continued band maintenance.  All sharp instruments were accounted for and placed in the appropriate reciprocal bin.      Siva Morales MD [facility]  03/24/2025  11:00 CDT

## 2025-03-26 RX ORDER — TIRZEPATIDE 10 MG/.5ML
10 INJECTION, SOLUTION SUBCUTANEOUS WEEKLY
Qty: 2 ML | Refills: 0 | Status: SHIPPED | OUTPATIENT
Start: 2025-03-26

## 2025-03-31 ENCOUNTER — PRIOR AUTHORIZATION (OUTPATIENT)
Dept: FAMILY MEDICINE CLINIC | Facility: CLINIC | Age: 60
End: 2025-03-31
Payer: COMMERCIAL

## 2025-03-31 NOTE — TELEPHONE ENCOUNTER
Donny has not yet replied to your PA request. Depending on the information you've provided, additional questions may be returned by the plan. You may close this dialog, return to your dashboard, and perform other tasks.    To check for an update later, open this request again from your dashboard.    If Donny has not replied to your request within 24 hours please contact Donny at [phone].

## 2025-04-22 ENCOUNTER — OFFICE VISIT (OUTPATIENT)
Dept: BARIATRICS/WEIGHT MGMT | Facility: CLINIC | Age: 60
End: 2025-04-22
Payer: COMMERCIAL

## 2025-04-22 VITALS
HEIGHT: 64 IN | BODY MASS INDEX: 42.43 KG/M2 | DIASTOLIC BLOOD PRESSURE: 85 MMHG | TEMPERATURE: 98 F | HEART RATE: 81 BPM | SYSTOLIC BLOOD PRESSURE: 139 MMHG | OXYGEN SATURATION: 98 % | WEIGHT: 248.5 LBS

## 2025-04-22 DIAGNOSIS — E78.2 MIXED HYPERLIPIDEMIA: ICD-10-CM

## 2025-04-22 DIAGNOSIS — E66.813 CLASS 3 SEVERE OBESITY WITH SERIOUS COMORBIDITY AND BODY MASS INDEX (BMI) OF 40.0 TO 44.9 IN ADULT, UNSPECIFIED OBESITY TYPE: Primary | ICD-10-CM

## 2025-04-22 DIAGNOSIS — E66.01 CLASS 3 SEVERE OBESITY WITH SERIOUS COMORBIDITY AND BODY MASS INDEX (BMI) OF 40.0 TO 44.9 IN ADULT, UNSPECIFIED OBESITY TYPE: Primary | ICD-10-CM

## 2025-04-22 DIAGNOSIS — Z98.84 HX OF LAPAROSCOPIC GASTRIC BANDING: ICD-10-CM

## 2025-04-22 PROCEDURE — 99213 OFFICE O/P EST LOW 20 MIN: CPT | Performed by: NURSE PRACTITIONER

## 2025-04-22 NOTE — PROGRESS NOTES
"Patient Care Team:  Mickie Gonzales APRN as PCP - General (Nurse Practitioner)  Denilson Bingham MD as Consulting Physician (General Surgery)    Chief Complaint: S/P Sleeve Gastrectomy    Subjective     History of Present Illness  The patient is a 60-year-old female who presents for evaluation of obesity.    She reports a positive experience following the removal of fluid from her band, as she has not experienced any episodes of vomiting. This has led to an increased confidence in her eating habits and a reduction in stress related to food intake. However, she acknowledges occasional overeating, which has resulted in inflammation over the past week. She expresses interest in a medical weight loss program to manage her obesity. She has been adhering to a food journal for the past 30 days, with a reported caloric intake of 2800 calories on one day. Her diet typically includes a protein shake and grapes for breakfast, and turkey roast with potatoes and carrots for lunch. She does not report any cravings for chips. She is currently on Zepbound 10, which she completed this week. She notes that the medication is effective initially, but towards the end of the 7-day period, she experiences sugar cravings. She is also under the care of a psychologist to assist with her eating habits.    Supplemental Information  She has issues with her Achilles tendon and is inquiring about steroid injections.    MEDICATIONS  Current: Zepbound        Objective     Vital Signs  /85 (BP Location: Right arm, Patient Position: Sitting, Cuff Size: Adult)   Pulse 81   Temp 98 °F (36.7 °C) (Temporal)   Ht 162.6 cm (64\")   Wt 113 kg (248 lb 8 oz)   LMP 12/03/2017   SpO2 98%   BMI 42.65 kg/m²          Physical Exam:  Physical Exam  Vitals reviewed.   Constitutional:       Appearance: She is obese.   Cardiovascular:      Rate and Rhythm: Normal rate and regular rhythm.   Pulmonary:      Effort: Pulmonary effort is normal.   Abdominal: "      General: Bowel sounds are normal.      Palpations: Abdomen is soft.   Musculoskeletal:         General: Normal range of motion.   Skin:     General: Skin is warm and dry.   Neurological:      Mental Status: She is alert and oriented to person, place, and time.   Psychiatric:         Mood and Affect: Mood normal.         Behavior: Behavior normal.          Physical Exam  Vital Signs  BMI is 42.     Results Review:    Labs reviewed    Results        Medication Reviewed:   Current Outpatient Medications   Medication Sig Dispense Refill    atorvastatin (LIPITOR) 40 MG tablet Take 1 tablet by mouth every night at bedtime. 90 tablet 0    DULoxetine (CYMBALTA) 30 MG capsule Take 1 capsule by mouth Daily. 90 capsule 1    fluticasone (FLONASE) 50 MCG/ACT nasal spray Administer 2 sprays into the nostril(s) as directed by provider Daily. 18.2 mL 2    meloxicam (MOBIC) 15 MG tablet TAKE 1 TABLET BY MOUTH EVERY DAY 90 tablet 2    Multiple Vitamins-Minerals (ONE-A-DAY 50 PLUS PO)       NON FORMULARY Take 500 mg by mouth Daily. Tumeric 500 mg daily      ondansetron (Zofran) 4 MG tablet Take 1 tablet by mouth Every 8 (Eight) Hours As Needed for Nausea. 30 tablet 0    SUMAtriptan (Imitrex) 100 MG tablet Take one tablet at onset of headache. May repeat dose one time in 2 hours if headache not relieved. No more than two tablets in 24 hours. 10 tablet 0    Tirzepatide-Weight Management (Zepbound) 10 MG/0.5ML solution auto-injector Inject 0.5 mL under the skin into the appropriate area as directed 1 (One) Time Per Week. 2 mL 0    Tirzepatide 10 MG/0.5ML solution auto-injector Inject 10 mg under the skin into the appropriate area as directed 1 (One) Time Per Week. (Patient not taking: Reported on 4/22/2025) 2 mL 0     No current facility-administered medications for this visit.       Assessment & Plan      Assessment & Plan  1. Obesity.  Her BMI is currently at 42, indicating class 3 obesity. She has been informed about the  potential benefits of a medical weight loss program, including comprehensive education and personalized meal plans. She has expressed interest in attending a new patient class, which will provide her with valuable information about obesity and dietary management. She has been advised to increase her intake of protein and fiber, which should naturally help reduce her cravings. Alternatives to candy bars, such as protein bars, have been suggested. She has also been encouraged to maintain a food journal and share it via [employer] with Cassie for review. A meal plan consisting of four meals per day has been provided and reviewed with her. She has been advised to incorporate vegetables into her breakfast, either by blending spinach or frozen cauliflower rice into her protein shake or by consuming micro cucumbers or carrots after her protein shake and grapes. She has been advised to take a 10-minute walk after dinner to help manage her glucose levels and cravings. She will continue her Zepbound regimen, with an emphasis on increasing protein and fiber intake and taking a 10-minute walk post-meal.    Follow-up  The patient will follow up in 4 weeks with Cassie, the dietitian. She will return to listen to the class.     PROCEDURE  Fluid was removed from the band last month.    POD  2023  from a lap band.  Diagnoses and all orders for this visit:    1. Class 3 severe obesity with serious comorbidity and body mass index (BMI) of 40.0 to 44.9 in adult, unspecified obesity type (Primary)  Assessment & Plan:  Patient's (Body mass index is 42.65 kg/m².) indicates that they are morbidly/severely obese (BMI > 40 or > 35 with obesity - related health condition) with health conditions that include dyslipidemias . Weight is improving with treatment. BMI  is above average; BMI management plan is completed. We discussed portion control and increasing exercise.       2. Hx of laparoscopic gastric banding           Alyssa Randhawa and MARIBELL discussed  the importance of changing behavior for consistent and successful weight loss. We first reviewed again the definition of a meal which is defined as portion sizes less than a half a cup and those portions incorporating a protein. Specifically the protein should fill half of that volume. I also explained that she should attempt to consume 4 meals as defined above daily and to avoid snacking or grazing. She should also be mindful of adequate hydration by consuming at least 64 oz of water daily and incorporation of a regular exercise regimen.     I discussed the importance of taking her multivitamins as directed.  She is to return to our office 1 month or as needed.      CHERRIE Medrano  04/22/25  10:36 CDT    Patient or patient representative verbalized consent for the use of Ambient Listening during the visit with  CHERRIE Medrano for chart documentation. 4/22/2025  10:30 CDT

## 2025-04-22 NOTE — ASSESSMENT & PLAN NOTE
Patient's (Body mass index is 42.65 kg/m².) indicates that they are morbidly/severely obese (BMI > 40 or > 35 with obesity - related health condition) with health conditions that include dyslipidemias . Weight is improving with treatment. BMI  is above average; BMI management plan is completed. We discussed portion control and increasing exercise.

## 2025-04-23 ENCOUNTER — OFFICE VISIT (OUTPATIENT)
Dept: FAMILY MEDICINE CLINIC | Facility: CLINIC | Age: 60
End: 2025-04-23
Payer: COMMERCIAL

## 2025-04-23 VITALS
HEIGHT: 63 IN | BODY MASS INDEX: 43.59 KG/M2 | WEIGHT: 246 LBS | TEMPERATURE: 98 F | OXYGEN SATURATION: 98 % | HEART RATE: 79 BPM | SYSTOLIC BLOOD PRESSURE: 130 MMHG | DIASTOLIC BLOOD PRESSURE: 93 MMHG

## 2025-04-23 DIAGNOSIS — M76.61 TENDONITIS, ACHILLES, RIGHT: Primary | ICD-10-CM

## 2025-04-23 PROCEDURE — 99213 OFFICE O/P EST LOW 20 MIN: CPT | Performed by: NURSE PRACTITIONER

## 2025-04-23 RX ORDER — PREDNISONE 10 MG/1
TABLET ORAL
Qty: 21 TABLET | Refills: 0 | Status: SHIPPED | OUTPATIENT
Start: 2025-04-23

## 2025-04-23 RX ORDER — TRIAMCINOLONE ACETONIDE 40 MG/ML
40 INJECTION, SUSPENSION INTRA-ARTICULAR; INTRAMUSCULAR ONCE
Status: COMPLETED | OUTPATIENT
Start: 2025-04-23 | End: 2025-04-23

## 2025-04-23 RX ADMIN — TRIAMCINOLONE ACETONIDE 40 MG: 40 INJECTION, SUSPENSION INTRA-ARTICULAR; INTRAMUSCULAR at 08:25

## 2025-04-23 NOTE — PROGRESS NOTES
CC: achilles tendonitis    History:  Alyssa Randhawa is a 60 y.o. female who presents today for evaluation of the above problems.      Patient complains of right achilles pain. This is a chronic condition. It has been flared for several days after having to rush through airport.       Inflamed Achillias Tendon  Pertinent negative symptoms include no abdominal pain, no anorexia, no joint pain, no change in stool, no chest pain, no chills, no congestion, no cough, no diaphoresis, no fatigue, no fever, no headaches, no joint swelling, no myalgias, no nausea, no neck pain, no numbness, no rash, no sore throat, no swollen glands, no dysuria, no vertigo, no visual change, no vomiting and no weakness.   Treatment and/or Medications comments include: Tylonal. Advil. Heat. Ice     ROS:  Review of Systems   Constitutional:  Negative for chills, diaphoresis, fatigue and fever.   HENT:  Negative for congestion and sore throat.    Respiratory:  Negative for cough.    Cardiovascular:  Negative for chest pain.   Gastrointestinal:  Negative for abdominal pain, anorexia, nausea and vomiting.   Genitourinary:  Negative for dysuria.   Musculoskeletal:  Negative for joint pain, myalgias and neck pain.   Skin:  Negative for rash.   Neurological:  Negative for vertigo, weakness, numbness and headaches.       No Known Allergies  Past Medical History:   Diagnosis Date    Anxiety 10 years    Arthritis     Bone spur     spine    Ear infection     Headache 2 months    Heel spur     Obesity Lifetime    Have lost 80 pounds since December 5 2018    PONV (postoperative nausea and vomiting)     Wears glasses      Past Surgical History:   Procedure Laterality Date    BARIATRIC SURGERY  12/22/23    Lap Band. [facility]    CHOLECYSTECTOMY      COLONOSCOPY  3 years    No issues    ENDOMETRIAL ABLATION      FRACTURE SURGERY  6 years?    Spiral break metal plate right lower leg    KNEE MENISCAL REPAIR Left     LAPAROSCOPIC GASTRIC BANDING   12/22/2023    Dr. Bingham    LEG SURGERY Right     spiral fracture     TOTAL LAPAROSCOPIC HYSTERECTOMY SALPINGO OOPHORECTOMY Bilateral 12/06/2017    Procedure: TOTAL LAPAROSCOPIC HYSTERECTOMY BILATERAL SALPINGECTOMY WITH DAVINCI SI ROBOT, CYSTO;  Surgeon: Greg Mejía MD;  Location:  [facility];  Service:     WISDOM TOOTH EXTRACTION       Family History   Problem Relation Age of Onset    Coronary artery disease Mother     Hypertension Mother     Diabetes Mother     Anxiety disorder Mother     Arthritis Mother     Hypertension Father     Alcohol abuse Father     Arthritis Father     Breast cancer Sister 42    Anxiety disorder Sister     Asthma Sister     Cancer Sister         Breast cancer    Hypertension Sister     Learning disabilities Sister         Dislexia    No Known Problems Brother     Anxiety disorder Brother     No Known Problems Daughter     No Known Problems Son     No Known Problems Maternal Grandmother     No Known Problems Paternal Grandmother     Breast cancer Maternal Aunt     No Known Problems Paternal Aunt     Breast cancer Maternal Cousin     Breast cancer Maternal Cousin     Breast cancer Maternal Cousin     No Known Problems Other     Ovarian cancer Neg Hx     Uterine cancer Neg Hx     Colon cancer Neg Hx     Melanoma Neg Hx     BRCA 1/2 Neg Hx     Endometrial cancer Neg Hx       reports that she quit smoking about 26 years ago. Her smoking use included cigarettes. She started smoking about 41 years ago. She has a 3.8 pack-year smoking history. She has been exposed to tobacco smoke. She has never used smokeless tobacco. She reports current alcohol use. She reports that she does not use drugs.      Current Outpatient Medications:     atorvastatin (LIPITOR) 40 MG tablet, Take 1 tablet by mouth every night at bedtime., Disp: 90 tablet, Rfl: 0    DULoxetine (CYMBALTA) 30 MG capsule, Take 1 capsule by mouth Daily., Disp: 90 capsule, Rfl: 1    fluticasone (FLONASE) 50 MCG/ACT nasal spray,  "Administer 2 sprays into the nostril(s) as directed by provider Daily., Disp: 18.2 mL, Rfl: 2    meloxicam (MOBIC) 15 MG tablet, TAKE 1 TABLET BY MOUTH EVERY DAY, Disp: 90 tablet, Rfl: 2    Multiple Vitamins-Minerals (ONE-A-DAY 50 PLUS PO), , Disp: , Rfl:     NON FORMULARY, Take 500 mg by mouth Daily. Tumeric 500 mg daily, Disp: , Rfl:     ondansetron (Zofran) 4 MG tablet, Take 1 tablet by mouth Every 8 (Eight) Hours As Needed for Nausea., Disp: 30 tablet, Rfl: 0    SUMAtriptan (Imitrex) 100 MG tablet, Take one tablet at onset of headache. May repeat dose one time in 2 hours if headache not relieved. No more than two tablets in 24 hours., Disp: 10 tablet, Rfl: 0    Tirzepatide 10 MG/0.5ML solution auto-injector, Inject 10 mg under the skin into the appropriate area as directed 1 (One) Time Per Week., Disp: 2 mL, Rfl: 0    Tirzepatide-Weight Management (Zepbound) 10 MG/0.5ML solution auto-injector, Inject 0.5 mL under the skin into the appropriate area as directed 1 (One) Time Per Week., Disp: 2 mL, Rfl: 0    predniSONE (DELTASONE) 10 MG (21) dose pack, Use as directed on package, Disp: 21 tablet, Rfl: 0    Current Facility-Administered Medications:     triamcinolone acetonide (KENALOG-40) injection 40 mg, 40 mg, Intramuscular, Once, Mickie Gonzales, APRN    OBJECTIVE:  /93 (BP Location: Left arm, Patient Position: Sitting, Cuff Size: Adult)   Pulse 79   Temp 98 °F (36.7 °C) (Oral)   Ht 160 cm (63\")   Wt 112 kg (246 lb)   LMP 12/03/2017   SpO2 98%   BMI 43.58 kg/m²    Physical Exam  Vitals reviewed.   Constitutional:       Appearance: She is well-developed.   Cardiovascular:      Rate and Rhythm: Normal rate.   Pulmonary:      Effort: Pulmonary effort is normal.   Neurological:      Mental Status: She is alert and oriented to person, place, and time.   Psychiatric:         Behavior: Behavior normal.         Assessment/Plan    Diagnoses and all orders for this visit:    1. Tendonitis, Achilles, right " (Primary)  -     triamcinolone acetonide (KENALOG-40) injection 40 mg  -     predniSONE (DELTASONE) 10 MG (21) dose pack; Use as directed on package  Dispense: 21 tablet; Refill: 0        An After Visit Summary was printed and given to the patient at discharge.  Return if symptoms worsen or fail to improve, for Next scheduled follow up.       CHERRIE Emerson 4/23/25    Electronically signed.

## 2025-04-25 DIAGNOSIS — E66.813 CLASS 3 SEVERE OBESITY WITH SERIOUS COMORBIDITY AND BODY MASS INDEX (BMI) OF 40.0 TO 44.9 IN ADULT, UNSPECIFIED OBESITY TYPE: Primary | ICD-10-CM

## 2025-04-25 DIAGNOSIS — E66.01 CLASS 3 SEVERE OBESITY WITH SERIOUS COMORBIDITY AND BODY MASS INDEX (BMI) OF 40.0 TO 44.9 IN ADULT, UNSPECIFIED OBESITY TYPE: Primary | ICD-10-CM

## 2025-04-28 ENCOUNTER — PRIOR AUTHORIZATION (OUTPATIENT)
Dept: FAMILY MEDICINE CLINIC | Facility: CLINIC | Age: 60
End: 2025-04-28
Payer: COMMERCIAL

## 2025-04-28 ENCOUNTER — TELEPHONE (OUTPATIENT)
Dept: BARIATRICS/WEIGHT MGMT | Facility: CLINIC | Age: 60
End: 2025-04-28
Payer: COMMERCIAL

## 2025-04-28 RX ORDER — TIRZEPATIDE 10 MG/.5ML
10 INJECTION, SOLUTION SUBCUTANEOUS WEEKLY
Qty: 2 ML | Refills: 0 | Status: SHIPPED | OUTPATIENT
Start: 2025-04-28 | End: 2025-04-29 | Stop reason: ALTCHOICE

## 2025-04-28 NOTE — TELEPHONE ENCOUNTER
Rx Refill Note  Requested Prescriptions     Pending Prescriptions Disp Refills    Tirzepatide-Weight Management (Zepbound) 10 MG/0.5ML solution auto-injector 2 mL 0     Sig: Inject 0.5 mL under the skin into the appropriate area as directed 1 (One) Time Per Week.      Last office visit with prescribing clinician: 4/23/2025   Last telemedicine visit with prescribing clinician: Visit date not found   Next office visit with prescribing clinician: Visit date not found   CPE done                       Would you like a call back once the refill request has been completed: [] Yes [] No    If the office needs to give you a call back, can they leave a voicemail: [] Yes [] No    Jennifer Hurtado MA  04/28/25, 10:06 CDT

## 2025-04-28 NOTE — TELEPHONE ENCOUNTER
So she sent this to us and after looking I noticed her PCP does her Zepbound or she requested it from her. I wrote her in a separate msg telling her that we won't fill this rx if her PCP is filling it.

## 2025-04-28 NOTE — TELEPHONE ENCOUNTER
[facility] has not yet replied to your PA request. Depending on the information you've provided, additional questions may be returned by the plan. You may close this dialog, return to your dashboard, and perform other tasks.    To check for an update later, open this request again from your dashboard.    If [facility] has not replied to your request within 24 hours please contact [facility] at [phone].

## 2025-04-29 DIAGNOSIS — E78.2 MIXED HYPERLIPIDEMIA: ICD-10-CM

## 2025-04-29 DIAGNOSIS — E66.813 CLASS 3 SEVERE OBESITY WITH SERIOUS COMORBIDITY AND BODY MASS INDEX (BMI) OF 40.0 TO 44.9 IN ADULT, UNSPECIFIED OBESITY TYPE: Primary | ICD-10-CM

## 2025-04-29 DIAGNOSIS — E66.01 CLASS 3 SEVERE OBESITY WITH SERIOUS COMORBIDITY AND BODY MASS INDEX (BMI) OF 40.0 TO 44.9 IN ADULT, UNSPECIFIED OBESITY TYPE: Primary | ICD-10-CM

## 2025-04-29 RX ORDER — SEMAGLUTIDE 0.25 MG/.5ML
0.25 INJECTION, SOLUTION SUBCUTANEOUS WEEKLY
Qty: 2 ML | Refills: 0 | Status: SHIPPED | OUTPATIENT
Start: 2025-04-29

## 2025-04-29 NOTE — TELEPHONE ENCOUNTER
This request has received a denial. View the bottom of the request for an electronic copy of the denial letter with a list of denial reasons from the plan.    After reading the electronic denial letter, you may choose to complete an appeal. View the bottom of the request to see if an eAppeal is available.

## 2025-04-30 ENCOUNTER — PRIOR AUTHORIZATION (OUTPATIENT)
Dept: FAMILY MEDICINE CLINIC | Facility: CLINIC | Age: 60
End: 2025-04-30
Payer: COMMERCIAL

## 2025-04-30 NOTE — TELEPHONE ENCOUNTER
Your prior authorization for [employer] has been approved!  More Info  Personalized support and financial assistance may be available through the 's [employer] program. For more information, and to see program requirements, click on the More Info button to the right.    Message from plan: Your PA request has been approved. Additional information will be provided in the approval communication. (Message 3455). Authorization Expiration Date: November 26, 2025.

## 2025-05-16 ENCOUNTER — TELEMEDICINE (OUTPATIENT)
Dept: BARIATRICS/WEIGHT MGMT | Facility: CLINIC | Age: 60
End: 2025-05-16
Payer: COMMERCIAL

## 2025-05-16 DIAGNOSIS — E66.813 CLASS 3 SEVERE OBESITY DUE TO EXCESS CALORIES WITH SERIOUS COMORBIDITY AND BODY MASS INDEX (BMI) OF 40.0 TO 44.9 IN ADULT: Primary | ICD-10-CM

## 2025-05-16 DIAGNOSIS — E66.01 CLASS 3 SEVERE OBESITY DUE TO EXCESS CALORIES WITH SERIOUS COMORBIDITY AND BODY MASS INDEX (BMI) OF 40.0 TO 44.9 IN ADULT: Primary | ICD-10-CM

## 2025-05-16 NOTE — PROGRESS NOTES
"Metabolic and Bariatric Surgery Adult Nutrition Assessment    Patient Name: Alyssa Randhawa   YOB: 1965   MRN: [id number]     Assessment Date:  05/16/2025     You have chosen to receive care through a telehealth visit.  Do you consent to use a video/audio connection for your medical care today? Yes   Patient Location: in [place]  Provider Location: [facility]    Reason for Visit: Initial Nutrition Assessment     Treatment Pathway: Medical Weight Loss  Hx Lap Band placement; s/p removal of fluid March 2025.    Assessment    Anthropometrics   Wt Readings from Last 1 Encounters:   04/23/25 112 kg (246 lb)     Ht Readings from Last 1 Encounters:   04/23/25 160 cm (63\")     BMI Readings from Last 1 Encounters:   04/23/25 43.58 kg/m²        Past Medical History:   Diagnosis Date    Anxiety 10 years    Arthritis     Bone spur     spine    Ear infection     Headache 2 months    Heel spur     Obesity Lifetime    Have lost 80 pounds since December 5 2018    PONV (postoperative nausea and vomiting)     Wears glasses       Past Surgical History:   Procedure Laterality Date    BARIATRIC SURGERY  12/22/23    Lap Band. [facility]    CHOLECYSTECTOMY      COLONOSCOPY  3 years    No issues    ENDOMETRIAL ABLATION      FRACTURE SURGERY  6 years?    Spiral break metal plate right lower leg    KNEE MENISCAL REPAIR Left     LAPAROSCOPIC GASTRIC BANDING  12/22/2023    Dr. Bingham    LEG SURGERY Right     spiral fracture     TOTAL LAPAROSCOPIC HYSTERECTOMY SALPINGO OOPHORECTOMY Bilateral 12/06/2017    Procedure: TOTAL LAPAROSCOPIC HYSTERECTOMY BILATERAL SALPINGECTOMY WITH DAVINCI SI ROBOT, CYSTO;  Surgeon: Greg Mejía MD;  Location: [facility];  Service:     WISDOM TOOTH EXTRACTION        Current Outpatient Medications   Medication Sig Dispense Refill    atorvastatin (LIPITOR) 40 MG tablet Take 1 tablet by mouth every night at bedtime. 90 tablet 0    DULoxetine (CYMBALTA) 30 MG capsule Take 1 capsule by " "mouth Daily. 90 capsule 1    fluticasone (FLONASE) 50 MCG/ACT nasal spray Administer 2 sprays into the nostril(s) as directed by provider Daily. 18.2 mL 2    meloxicam (MOBIC) 15 MG tablet TAKE 1 TABLET BY MOUTH EVERY DAY 90 tablet 2    Multiple Vitamins-Minerals (ONE-A-DAY 50 PLUS PO)       NON FORMULARY Take 500 mg by mouth Daily. Tumeric 500 mg daily      ondansetron (Zofran) 4 MG tablet Take 1 tablet by mouth Every 8 (Eight) Hours As Needed for Nausea. 30 tablet 0    predniSONE (DELTASONE) 10 MG (21) dose pack Use as directed on package 21 tablet 0    Semaglutide-Weight Management (Wegovy) 0.25 MG/0.5ML solution auto-injector Inject 0.5 mL under the skin into the appropriate area as directed 1 (One) Time Per Week. 2 mL 0    SUMAtriptan (Imitrex) 100 MG tablet Take one tablet at onset of headache. May repeat dose one time in 2 hours if headache not relieved. No more than two tablets in 24 hours. 10 tablet 0     No current facility-administered medications for this visit.      No Known Allergies       Nutrition Recall  Eating 4 meals daily. Food journal reviewed.  Meals 1 and 2 are meal planned and are best meals; by the end of the day is hungry and less likely to make as good of choices per her report.  (M1) ~ 7 am on the way to work  (M2) ~11/11:30   (M3) ~3 pm hungry   (M4) evening  Monitoring portions- yes  Calculating Protein- via [employer]    Success this Month: does well to keep environment \"grab-and-go\" . Has significantly reduced sugar intake. Is food journaling well and likes the accountability it brings for her. Continues to work on mindful eating practices. Food journaling and writing emotions     Barriers: If takes food for a week to work will go eat more; has tendencies to eat more if it is available. Does crave sugar on some days.    Nutrition Intervention  Nutrition education for morbid obesity hx lap band (removal of fluid 3/24/25) . Nutrition coaching and intensive behavioral therapy for " behavior change provided.  Strategies used included Comprehensive education, Motivational Interviewing , Problem Solving, Skill Development for meal planning, increasing fiber intake, and Ongoing reinforcement  Review of medical weight loss prescription 4 meal/day plan and reviewed nutritional needs for Medical Weight Loss.  Self-monitoring strategies such as keeping a food journal (on paper or electronically) and calculating fluid/protein intake were discussed.  Recommend increasing physical activity, beyond normal daily habits, gradually working to reach ~30 minutes daily.     Recommended Diet Changes- Green Prescription Meal Plan  Eat 4 meals per day with protein and vegetables at each meal; reduce/eliminate Group C carbohydrates after meal 2., Protein goal: 65+gms., Eat vegetables first at each meal., Discussed protein guidelines for shakes and bars., Reduce snacking -use foods from free foods list only., Reduce fat, sugar, and/or salt in food choices., Choose more nutrient dense foods., Choose foods with increased fiber., Monitor portion sizes using a food scale and/or measuring cup., Eliminate soda and sugar-sweetened beverages, and Increase fluid intake to 64 ounces per day      Goals  1.. Increase fiber - discussed various options such as kim seeds, nuts/seeds, beans, increasing vegetable portions at meals, etc. Will send patient the Fiber handout for her to be able to visualize foods with more fiber and calculate if needed.  2. Reduce carbohydrates at evenings meals - anticipates that taking 15 minutes each night to plan for the next day(s) will be beneficial in helping think about these meals ahead of time and improving meal content.    Monitoring/Evaluation Plan  Anticipate follow in 1 months. Continue collaboration of care with physician and treatment team.     Time Spent 20 minutes    Electronically signed by  Cassie Chong RDN, LD  05/16/2025 09:02 CDT.

## 2025-05-19 DIAGNOSIS — F41.9 ANXIETY AND DEPRESSION: ICD-10-CM

## 2025-05-19 DIAGNOSIS — F32.A ANXIETY AND DEPRESSION: ICD-10-CM

## 2025-05-20 RX ORDER — DULOXETIN HYDROCHLORIDE 30 MG/1
30 CAPSULE, DELAYED RELEASE ORAL DAILY
Qty: 90 CAPSULE | Refills: 2 | Status: SHIPPED | OUTPATIENT
Start: 2025-05-20

## 2025-05-20 NOTE — TELEPHONE ENCOUNTER
Rx Refill Note  Requested Prescriptions     Pending Prescriptions Disp Refills    DULoxetine (CYMBALTA) 30 MG capsule [Pharmacy Med Name: DULOXETINE HCL DR 30 MG CAP] 90 capsule 1     Sig: TAKE 1 CAPSULE BY MOUTH EVERY DAY      Last office visit with prescribing clinician: 4/23/2025   Last telemedicine visit with prescribing clinician: Visit date not found   Next office visit with prescribing clinician:         Jennifer Tafoya MA  05/20/25, 06:58 CDT

## 2025-05-25 DIAGNOSIS — E66.01 CLASS 3 SEVERE OBESITY WITH SERIOUS COMORBIDITY AND BODY MASS INDEX (BMI) OF 40.0 TO 44.9 IN ADULT, UNSPECIFIED OBESITY TYPE: ICD-10-CM

## 2025-05-25 DIAGNOSIS — E78.2 MIXED HYPERLIPIDEMIA: ICD-10-CM

## 2025-05-25 DIAGNOSIS — E66.813 CLASS 3 SEVERE OBESITY WITH SERIOUS COMORBIDITY AND BODY MASS INDEX (BMI) OF 40.0 TO 44.9 IN ADULT, UNSPECIFIED OBESITY TYPE: ICD-10-CM

## 2025-05-27 RX ORDER — SEMAGLUTIDE 0.5 MG/.5ML
0.5 INJECTION, SOLUTION SUBCUTANEOUS WEEKLY
Qty: 2 ML | Refills: 0 | Status: SHIPPED | OUTPATIENT
Start: 2025-05-27

## 2025-05-27 NOTE — TELEPHONE ENCOUNTER
Rx Refill Note  Requested Prescriptions     Pending Prescriptions Disp Refills    Wegovy 0.25 MG/0.5ML solution auto-injector [Pharmacy Med Name: WEGOVY 0.25 MG/0.5 ML PEN]       Sig: INJECT 0.5 ML SUBCUTANEOUSLY INTO THE APPROPRIATE AREA AS DIRECTED ONCE WEEKLY      Last office visit with prescribing clinician: 4/23/2025   Last telemedicine visit with prescribing clinician: Visit date not found   Next office visit with prescribing clinician:     Jennifer Tafoya MA  05/27/25, 07:27 CDT

## 2025-06-26 DIAGNOSIS — E66.813 CLASS 3 SEVERE OBESITY WITH SERIOUS COMORBIDITY AND BODY MASS INDEX (BMI) OF 40.0 TO 44.9 IN ADULT, UNSPECIFIED OBESITY TYPE: ICD-10-CM

## 2025-06-26 DIAGNOSIS — E78.2 MIXED HYPERLIPIDEMIA: ICD-10-CM

## 2025-06-26 RX ORDER — SEMAGLUTIDE 0.5 MG/.5ML
0.5 INJECTION, SOLUTION SUBCUTANEOUS WEEKLY
Qty: 2 ML | Refills: 0 | Status: SHIPPED | OUTPATIENT
Start: 2025-06-26

## 2025-06-26 NOTE — TELEPHONE ENCOUNTER
Rx Refill Note  Requested Prescriptions     Pending Prescriptions Disp Refills    Semaglutide-Weight Management (Wegovy) 0.5 MG/0.5ML solution auto-injector 2 mL 0     Sig: Inject 0.5 mL under the skin into the appropriate area as directed 1 (One) Time Per Week.      Last office visit with prescribing clinician: 4/23/2025   Last telemedicine visit with prescribing clinician: Visit date not found   Next office visit with prescribing clinician:     Jennifer Tafoya MA  06/26/25, 08:43 CDT

## 2025-07-21 DIAGNOSIS — E66.813 CLASS 3 SEVERE OBESITY WITH SERIOUS COMORBIDITY AND BODY MASS INDEX (BMI) OF 40.0 TO 44.9 IN ADULT, UNSPECIFIED OBESITY TYPE: ICD-10-CM

## 2025-07-21 DIAGNOSIS — E78.2 MIXED HYPERLIPIDEMIA: ICD-10-CM

## 2025-07-21 RX ORDER — SEMAGLUTIDE 0.5 MG/.5ML
INJECTION, SOLUTION SUBCUTANEOUS
Qty: 2 ML | Refills: 3 | Status: SHIPPED | OUTPATIENT
Start: 2025-07-21 | End: 2025-07-24

## 2025-07-21 NOTE — TELEPHONE ENCOUNTER
Rx Refill Note  Requested Prescriptions     Pending Prescriptions Disp Refills    Wegovy 0.5 MG/0.5ML solution auto-injector [Pharmacy Med Name: WEGOVY 0.5 MG/0.5 ML PEN]       Sig: INJECT 0.5 ML SUBCUTANEOUSLY INTO THE APPROPRIATE AREA AS DIRECTED ONCE WEEKLY      Last office visit with prescribing clinician: 4/23/2025   Last telemedicine visit with prescribing clinician: Visit date not found   Next office visit with prescribing clinician    Jennifer Tafoya MA  07/21/25, 16:17 CDT

## 2025-07-24 ENCOUNTER — PATIENT MESSAGE (OUTPATIENT)
Dept: FAMILY MEDICINE CLINIC | Facility: CLINIC | Age: 60
End: 2025-07-24
Payer: COMMERCIAL

## 2025-07-24 DIAGNOSIS — E66.01 MORBID OBESITY WITH BMI OF 40.0-44.9, ADULT: Primary | ICD-10-CM

## 2025-07-24 RX ORDER — SEMAGLUTIDE 1 MG/.5ML
1 INJECTION, SOLUTION SUBCUTANEOUS WEEKLY
Qty: 2 ML | Refills: 0 | Status: SHIPPED | OUTPATIENT
Start: 2025-07-24

## 2025-08-21 DIAGNOSIS — E66.01 MORBID OBESITY WITH BMI OF 40.0-44.9, ADULT: ICD-10-CM

## 2025-08-21 RX ORDER — SEMAGLUTIDE 1 MG/.5ML
INJECTION, SOLUTION SUBCUTANEOUS
OUTPATIENT
Start: 2025-08-21

## 2025-08-22 RX ORDER — SEMAGLUTIDE 1.7 MG/.75ML
1.7 INJECTION, SOLUTION SUBCUTANEOUS WEEKLY
Qty: 3 ML | Refills: 0 | Status: SHIPPED | OUTPATIENT
Start: 2025-08-22

## (undated) DEVICE — ENDOPATH PNEUMONEEDLE INSUFFLATION NEEDLES WITH LUER LOCK CONNECTORS 120MM: Brand: ENDOPATH

## (undated) DEVICE — SUT VIC RAPD SZ 3/0 27IN PS1 VR935

## (undated) DEVICE — GLV SURG BIOGEL LTX PF 6 1/2

## (undated) DEVICE — TROC ANCHORPORT BLADELES LP 8X120MM

## (undated) DEVICE — CVR DISP HUG U VAC STEEP TREND

## (undated) DEVICE — HEWSON SUTURE RETRIEVER: Brand: HEWSON SUTURE RETRIEVER

## (undated) DEVICE — GLV SURG BIOGEL M LTX PF 8

## (undated) DEVICE — DRP WARMR SCOPE PILLOW SCPE 44X66IN STRL

## (undated) DEVICE — DUAL LUMEN STOMACH TUBE: Brand: SALEM SUMP

## (undated) DEVICE — ANTIBACTERIAL UNDYED BRAIDED (POLYGLACTIN 910), SYNTHETIC ABSORBABLE SUTURE: Brand: COATED VICRYL

## (undated) DEVICE — WIPE THERAWASH SLV SPEC CARE 2PK

## (undated) DEVICE — Device

## (undated) DEVICE — DEFOGGER!" ANTI FOG KIT: Brand: DEROYAL

## (undated) DEVICE — SYR LL TP 10ML STRL

## (undated) DEVICE — PK TURNOVER RM ADV

## (undated) DEVICE — PROTECTOR ULNA NERV

## (undated) DEVICE — HDRST INTUB GENTLETOUCH SLOT 7IN RT

## (undated) DEVICE — TOWEL,OR,DSP,ST,BLUE,DLX,10/PK,8PK/CS: Brand: MEDLINE

## (undated) DEVICE — 1000 SES SMOKE EVACUATION SYSTEM, HAND HELD TUBING SET: Brand: 1000 SES

## (undated) DEVICE — 3M™ WARMING BLANKET, UPPER BODY, 10 PER CASE, 42268: Brand: BAIR HUGGER™

## (undated) DEVICE — 2, DISPOSABLE SUCTION/IRRIGATOR WITHOUT DISPOSABLE TIP: Brand: STRYKEFLOW

## (undated) DEVICE — OBT BLADLES ENDOWRIST DAVINCI/S 8MM

## (undated) DEVICE — TIP COVER ACCESSORY

## (undated) DEVICE — GLV SURG TRIUMPH ORTHO W/ALOE PF LTX 7.0

## (undated) DEVICE — GLV SURG TRIUMPH NATURAL W/ALOE PF LTX 7 STRL

## (undated) DEVICE — DAVINCI: Brand: MEDLINE INDUSTRIES, INC.

## (undated) DEVICE — ST TBG AIRSEAL FLTR TRI LUM

## (undated) DEVICE — NDL HYPO PRECISIONGLIDE REG 22G 1 1/2

## (undated) DEVICE — GLV SURG TRIUMPH NATURAL W/ALOE PF LTX 6 STRL

## (undated) DEVICE — 3M™ STERI-STRIP™ REINFORCED ADHESIVE SKIN CLOSURES, R1547, 1/2 IN X 4 IN (12 MM X 100 MM), 6 STRIPS/ENVELOPE: Brand: 3M™ STERI-STRIP™

## (undated) DEVICE — GOWN,NON-REINFORCED,SIRUS,SET IN SLV,XL: Brand: MEDLINE